# Patient Record
Sex: MALE | Race: WHITE | NOT HISPANIC OR LATINO | Employment: OTHER | URBAN - METROPOLITAN AREA
[De-identification: names, ages, dates, MRNs, and addresses within clinical notes are randomized per-mention and may not be internally consistent; named-entity substitution may affect disease eponyms.]

---

## 2018-10-15 ENCOUNTER — APPOINTMENT (OUTPATIENT)
Dept: GENERAL RADIOLOGY | Facility: CLINIC | Age: 53
End: 2018-10-15
Attending: EMERGENCY MEDICINE
Payer: MEDICAID

## 2018-10-15 ENCOUNTER — HOSPITAL ENCOUNTER (EMERGENCY)
Facility: CLINIC | Age: 53
Discharge: ANOTHER HOSPITAL | End: 2018-10-16
Attending: EMERGENCY MEDICINE | Admitting: EMERGENCY MEDICINE
Payer: MEDICAID

## 2018-10-15 ENCOUNTER — APPOINTMENT (OUTPATIENT)
Dept: CT IMAGING | Facility: CLINIC | Age: 53
End: 2018-10-15
Attending: EMERGENCY MEDICINE
Payer: MEDICAID

## 2018-10-15 DIAGNOSIS — G80.0 SPASTIC QUADRIPLEGIC CEREBRAL PALSY (H): ICD-10-CM

## 2018-10-15 DIAGNOSIS — J69.0 ASPIRATION PNEUMONIA OF BOTH LOWER LOBES, UNSPECIFIED ASPIRATION PNEUMONIA TYPE (H): Primary | ICD-10-CM

## 2018-10-15 DIAGNOSIS — F71 MODERATELY MENTALLY RETARDED: ICD-10-CM

## 2018-10-15 DIAGNOSIS — A41.9 SEPSIS, DUE TO UNSPECIFIED ORGANISM: ICD-10-CM

## 2018-10-15 DIAGNOSIS — R11.2 NON-INTRACTABLE VOMITING WITH NAUSEA, UNSPECIFIED VOMITING TYPE: ICD-10-CM

## 2018-10-15 LAB
ALBUMIN SERPL-MCNC: 3.7 G/DL (ref 3.4–5)
ALBUMIN UR-MCNC: NEGATIVE MG/DL
ALP SERPL-CCNC: 102 U/L (ref 40–150)
ALT SERPL W P-5'-P-CCNC: 30 U/L (ref 0–70)
AMORPH CRY #/AREA URNS HPF: ABNORMAL /HPF
ANION GAP SERPL CALCULATED.3IONS-SCNC: 4 MMOL/L (ref 3–14)
APPEARANCE UR: ABNORMAL
AST SERPL W P-5'-P-CCNC: 34 U/L (ref 0–45)
BACTERIA #/AREA URNS HPF: ABNORMAL /HPF
BASOPHILS # BLD AUTO: 0 10E9/L (ref 0–0.2)
BASOPHILS NFR BLD AUTO: 0.1 %
BILIRUB SERPL-MCNC: 0.4 MG/DL (ref 0.2–1.3)
BILIRUB UR QL STRIP: NEGATIVE
BUN SERPL-MCNC: 21 MG/DL (ref 7–30)
CALCIUM SERPL-MCNC: 8.4 MG/DL (ref 8.5–10.1)
CHLORIDE SERPL-SCNC: 108 MMOL/L (ref 94–109)
CO2 BLDCOV-SCNC: 31 MMOL/L (ref 21–28)
CO2 SERPL-SCNC: 30 MMOL/L (ref 20–32)
COLOR UR AUTO: YELLOW
CREAT BLD-MCNC: 0.7 MG/DL (ref 0.66–1.25)
CREAT SERPL-MCNC: 0.71 MG/DL (ref 0.66–1.25)
DIFFERENTIAL METHOD BLD: ABNORMAL
EOSINOPHIL # BLD AUTO: 0.2 10E9/L (ref 0–0.7)
EOSINOPHIL NFR BLD AUTO: 1.6 %
ERYTHROCYTE [DISTWIDTH] IN BLOOD BY AUTOMATED COUNT: 12.6 % (ref 10–15)
GFR SERPL CREATININE-BSD FRML MDRD: >90 ML/MIN/1.7M2
GFR SERPL CREATININE-BSD FRML MDRD: >90 ML/MIN/1.7M2
GLUCOSE SERPL-MCNC: 100 MG/DL (ref 70–99)
GLUCOSE UR STRIP-MCNC: NEGATIVE MG/DL
HCT VFR BLD AUTO: 40.7 % (ref 40–53)
HGB BLD-MCNC: 13.6 G/DL (ref 13.3–17.7)
HGB UR QL STRIP: NEGATIVE
IMM GRANULOCYTES # BLD: 0 10E9/L (ref 0–0.4)
IMM GRANULOCYTES NFR BLD: 0.3 %
KETONES UR STRIP-MCNC: 20 MG/DL
LACTATE BLD-SCNC: 1.1 MMOL/L (ref 0.7–2.1)
LEUKOCYTE ESTERASE UR QL STRIP: NEGATIVE
LIPASE SERPL-CCNC: 408 U/L (ref 73–393)
LYMPHOCYTES # BLD AUTO: 0.3 10E9/L (ref 0.8–5.3)
LYMPHOCYTES NFR BLD AUTO: 2.6 %
MCH RBC QN AUTO: 32.1 PG (ref 26.5–33)
MCHC RBC AUTO-ENTMCNC: 33.4 G/DL (ref 31.5–36.5)
MCV RBC AUTO: 96 FL (ref 78–100)
MONOCYTES # BLD AUTO: 0.3 10E9/L (ref 0–1.3)
MONOCYTES NFR BLD AUTO: 2.4 %
MUCOUS THREADS #/AREA URNS LPF: PRESENT /LPF
NEUTROPHILS # BLD AUTO: 9.9 10E9/L (ref 1.6–8.3)
NEUTROPHILS NFR BLD AUTO: 93 %
NITRATE UR QL: NEGATIVE
NRBC # BLD AUTO: 0 10*3/UL
NRBC BLD AUTO-RTO: 0 /100
PCO2 BLDV: 54 MM HG (ref 40–50)
PH BLDV: 7.37 PH (ref 7.32–7.43)
PH UR STRIP: 6 PH (ref 5–7)
PLATELET # BLD AUTO: 145 10E9/L (ref 150–450)
PO2 BLDV: 18 MM HG (ref 25–47)
POTASSIUM SERPL-SCNC: 4.5 MMOL/L (ref 3.4–5.3)
PROT SERPL-MCNC: 7.2 G/DL (ref 6.8–8.8)
RBC # BLD AUTO: 4.24 10E12/L (ref 4.4–5.9)
RBC #/AREA URNS AUTO: <1 /HPF (ref 0–2)
SAO2 % BLDV FROM PO2: 24 %
SODIUM SERPL-SCNC: 142 MMOL/L (ref 133–144)
SOURCE: ABNORMAL
SP GR UR STRIP: 1.03 (ref 1–1.03)
SQUAMOUS #/AREA URNS AUTO: <1 /HPF (ref 0–1)
UROBILINOGEN UR STRIP-MCNC: 0 MG/DL (ref 0–2)
WBC # BLD AUTO: 10.6 10E9/L (ref 4–11)
WBC #/AREA URNS AUTO: 1 /HPF (ref 0–5)

## 2018-10-15 PROCEDURE — 36415 COLL VENOUS BLD VENIPUNCTURE: CPT | Performed by: EMERGENCY MEDICINE

## 2018-10-15 PROCEDURE — 80053 COMPREHEN METABOLIC PANEL: CPT | Performed by: EMERGENCY MEDICINE

## 2018-10-15 PROCEDURE — 81001 URINALYSIS AUTO W/SCOPE: CPT | Performed by: EMERGENCY MEDICINE

## 2018-10-15 PROCEDURE — 96361 HYDRATE IV INFUSION ADD-ON: CPT

## 2018-10-15 PROCEDURE — 74177 CT ABD & PELVIS W/CONTRAST: CPT

## 2018-10-15 PROCEDURE — 83690 ASSAY OF LIPASE: CPT | Performed by: EMERGENCY MEDICINE

## 2018-10-15 PROCEDURE — 25000128 H RX IP 250 OP 636: Performed by: EMERGENCY MEDICINE

## 2018-10-15 PROCEDURE — 25000132 ZZH RX MED GY IP 250 OP 250 PS 637: Performed by: EMERGENCY MEDICINE

## 2018-10-15 PROCEDURE — 82565 ASSAY OF CREATININE: CPT

## 2018-10-15 PROCEDURE — 96365 THER/PROPH/DIAG IV INF INIT: CPT | Mod: 59

## 2018-10-15 PROCEDURE — 71045 X-RAY EXAM CHEST 1 VIEW: CPT

## 2018-10-15 PROCEDURE — 87040 BLOOD CULTURE FOR BACTERIA: CPT | Mod: 91 | Performed by: EMERGENCY MEDICINE

## 2018-10-15 PROCEDURE — 82803 BLOOD GASES ANY COMBINATION: CPT

## 2018-10-15 PROCEDURE — 96375 TX/PRO/DX INJ NEW DRUG ADDON: CPT

## 2018-10-15 PROCEDURE — 99285 EMERGENCY DEPT VISIT HI MDM: CPT | Mod: 25

## 2018-10-15 PROCEDURE — 83605 ASSAY OF LACTIC ACID: CPT

## 2018-10-15 PROCEDURE — 85025 COMPLETE CBC W/AUTO DIFF WBC: CPT | Performed by: EMERGENCY MEDICINE

## 2018-10-15 RX ORDER — HYDROMORPHONE HYDROCHLORIDE 1 MG/ML
0.5 INJECTION, SOLUTION INTRAMUSCULAR; INTRAVENOUS; SUBCUTANEOUS ONCE
Status: COMPLETED | OUTPATIENT
Start: 2018-10-15 | End: 2018-10-15

## 2018-10-15 RX ORDER — AMPICILLIN AND SULBACTAM 2; 1 G/1; G/1
3 INJECTION, POWDER, FOR SOLUTION INTRAMUSCULAR; INTRAVENOUS ONCE
Status: COMPLETED | OUTPATIENT
Start: 2018-10-15 | End: 2018-10-16

## 2018-10-15 RX ORDER — IOPAMIDOL 755 MG/ML
500 INJECTION, SOLUTION INTRAVASCULAR ONCE
Status: COMPLETED | OUTPATIENT
Start: 2018-10-15 | End: 2018-10-15

## 2018-10-15 RX ORDER — ACETAMINOPHEN 650 MG/1
650 SUPPOSITORY RECTAL ONCE
Status: COMPLETED | OUTPATIENT
Start: 2018-10-15 | End: 2018-10-15

## 2018-10-15 RX ORDER — KETOROLAC TROMETHAMINE 15 MG/ML
10 INJECTION, SOLUTION INTRAMUSCULAR; INTRAVENOUS ONCE
Status: COMPLETED | OUTPATIENT
Start: 2018-10-15 | End: 2018-10-15

## 2018-10-15 RX ORDER — ONDANSETRON 2 MG/ML
4 INJECTION INTRAMUSCULAR; INTRAVENOUS ONCE
Status: COMPLETED | OUTPATIENT
Start: 2018-10-15 | End: 2018-10-15

## 2018-10-15 RX ADMIN — KETOROLAC TROMETHAMINE 10 MG: 15 INJECTION, SOLUTION INTRAMUSCULAR; INTRAVENOUS at 22:17

## 2018-10-15 RX ADMIN — ACETAMINOPHEN 650 MG: 650 SUPPOSITORY RECTAL at 22:22

## 2018-10-15 RX ADMIN — ONDANSETRON 4 MG: 2 INJECTION INTRAMUSCULAR; INTRAVENOUS at 22:17

## 2018-10-15 RX ADMIN — AMPICILLIN SODIUM AND SULBACTAM SODIUM 3 G: 2; 1 INJECTION, POWDER, FOR SOLUTION INTRAMUSCULAR; INTRAVENOUS at 23:27

## 2018-10-15 RX ADMIN — IOPAMIDOL 51 ML: 755 INJECTION, SOLUTION INTRAVENOUS at 22:53

## 2018-10-15 RX ADMIN — SODIUM CHLORIDE 500 ML: 9 INJECTION, SOLUTION INTRAVENOUS at 23:35

## 2018-10-15 RX ADMIN — SODIUM CHLORIDE, POTASSIUM CHLORIDE, SODIUM LACTATE AND CALCIUM CHLORIDE 1000 ML: 600; 310; 30; 20 INJECTION, SOLUTION INTRAVENOUS at 22:16

## 2018-10-15 RX ADMIN — SODIUM CHLORIDE 53 ML: 9 INJECTION, SOLUTION INTRAVENOUS at 22:58

## 2018-10-15 RX ADMIN — Medication 0.5 MG: at 22:20

## 2018-10-15 ASSESSMENT — ENCOUNTER SYMPTOMS
FEVER: 1
CHILLS: 1
VOMITING: 1
ABDOMINAL PAIN: 1
HEADACHES: 1
SHORTNESS OF BREATH: 0

## 2018-10-16 VITALS
HEART RATE: 103 BPM | TEMPERATURE: 100.9 F | RESPIRATION RATE: 20 BRPM | DIASTOLIC BLOOD PRESSURE: 63 MMHG | SYSTOLIC BLOOD PRESSURE: 122 MMHG | OXYGEN SATURATION: 95 % | WEIGHT: 101 LBS

## 2018-10-16 PROCEDURE — 25000128 H RX IP 250 OP 636: Performed by: EMERGENCY MEDICINE

## 2018-10-16 RX ADMIN — SODIUM CHLORIDE 500 ML: 9 INJECTION, SOLUTION INTRAVENOUS at 00:14

## 2018-10-16 NOTE — ED PROVIDER NOTES
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 The patient's nurse reports the patient vomited on Friday, had two large loose stools, but then the patient noted he felt better. She reports the patient went to bed around 1900 this evening and woke up with a headache, abdominal pain, chills, and vomiting. The patient denies any shortness of breath and currently has a fever of 103.    Allergies:  No known drug allergies     Medications:    The patient is not currently taking any prescribed medications.    Past Medical History:    SBO  Cerebral palsy  Adjustment disorder  Pneumonia  Mental retardation  Seizures    Past Surgical History:    Cholecystectomy    Family History:    History reviewed. No pertinent family history.     Social History:  The patient presents to the emergency department with his nurse.    Review of Systems   Constitutional: Positive for chills and fever.   Respiratory: Negative for shortness of breath.    Gastrointestinal: Positive for abdominal pain and vomiting.   Neurological: Positive for headaches.   All other systems reviewed and are negative.    Physical Exam   Patient Vitals for the past 24 hrs:   BP Temp Temp src Pulse Resp SpO2 Weight   10/15/18 2335 - 100.9  F (38.3  C) Rectal - - - -   10/15/18 2200 112/62 - - - - 91 % -   10/15/18 2145 111/72 - - - - 93 % -   10/15/18 2143 (!) 133/112 103.6  F (39.8  C) Rectal 106 20 - 45.8 kg (101 lb)   10/15/18 2142 (!) 123/93 - - - - - -     Physical Exam   HENT:   Right Ear: External ear normal.   Left Ear: External ear normal.   Nose: Nose normal.   This membranes dry   Eyes: Conjunctivae and lids are normal.   Neck: Neck supple. No tracheal deviation present.   Cardiovascular: Regular rhythm and intact distal pulses.    Tachycardic    Pulmonary/Chest:   Mild tachypnea lungs clear anteriorly   Abdominal: Soft. He exhibits no distension. There is no tenderness. There is no rebound and no guarding.   Musculoskeletal:   Diffuse muscle atrophy increased muscular tone and spasticity   Neurological:   Awake moves extremities spontaneously answer simple question with one for yes and 2 for now which is baseline according to his PCA   Skin: Skin is warm and dry. He is not diaphoretic.   Psychiatric: He has a normal mood and affect.   Nursing note and vitals reviewed.    Emergency Department Course   Imaging:  Radiographic findings were communicated with the patient and patient's caretaker who voiced understanding of the findings.    XR chest 1 View  IMPRESSION: A few hazy opacities scattered within the lower lungs  bilaterally, better visualized on the CT scan of the abdomen and  pelvis performed just prior to this study. These are suspicious for  Pneumonia.  As read by Radiology.    Abd/pelvis CT, IV contrast only TRAUMA / AAA  IMPRESSION:  1. Ill-defined patchy opacities scattered throughout the visualized  portions of bilateral lung bases, likely representing pneumonia.  2. No other cause of acute pain identified in the abdomen or pelvis.  Note that the appendix is not visualized and therefore early  appendicitis cannot be excluded.  As read by Radiology.    Laboratory:  UA:        UA with Microscopic (Final result)    Abnormal Component (Lab Inquiry)       Collection Time Result Time COLOR APPEARANCE URINE GLC URINEBILIN URINEKETON     10/15/18 23:25:00 10/15/18 23:52:46 Yellow Slightly Cloudy Negative Negative 20 (A)          Collection Time Result Time Specific Gravity Urine URINE BLOO pH Urine Protein Albumin Urine Urobilinogen mg/dL     10/15/18 23:25:00 10/15/18 23:52:46 1.035 Negative 6.0 Negative 0.0          Collection Time Result Time NITRITE Leukocyte Esterase Urine Source WBC/HPF RBC/HPF     10/15/18 23:25:00 10/15/18 23:52:46 Negative  Negative Catheterized Urine 1 <1          Collection Time Result Time BACTERIA Squamous Epithelial /HPF Urine Mucous Urine Amorphous Crystals     10/15/18 23:25:00 10/15/18 23:52:46 Few (A) <1 Present (A) Few (A)            CBC:  (L) o/w WNL (WBC 10.6, HGB 13.6)  CMP: Glucose 100 (H), Calcium 8.4 (L) o/w WNL (Creatinine 0.71)  Blood culture: in process  Lipase: 408 (H)    ISTAT gases lactate anna POCT: PCO2 54 (H), PO2 18 (L), Bicarbonate 31 (H) o/w WNL  Creatinine POCT: Creatinine 0.7, GFR Estimate >90    Interventions:  2216 Lactated ringers bolus 1000 mL IV  2217 Zofran 4 mg IV  2217 Toradol 10 mg IV  2220 Dilaudid 0.5 mg IV  2222 Tylenol 650 mg VT  2327 Unasyn 3 g IV  2335 NS 0.5L IV Bolus  Emergency Department Course:  Patient arrived via EMS.    Past medical records, nursing notes, and vitals reviewed.  2140: I performed an exam of the patient and obtained history, as documented above.     IV inserted and blood drawn.    The patient was sent for a chest x-ray and an abdominal pelvis CT while in the emergency department, findings above.    2330: I rechecked the patient. Explained findings to the patient and his caretaker.    2338: I discussed the patient with Dr. Richardson of [facility].    Findings and plan explained to the Patient and other: Caretaker.    Patient will be transferred to [facility] via EMS. Discussed the case with Dr. Richardson, who will admit the patient to a monitored bed for further monitoring, evaluation, and treatment.   Impression & Plan    Medical Decision Makin-year-old male with a history of cerebral palsy spastic quadriplegia here with breathing dictation concerning for sepsis with most likely sources including aspiration pneumonia urinary tract infection or recurrent small bowel obstruction with concern for concomitant peritonitis given the high fever.  He has nothing on his skin to suggest a soft tissue source mentally the baseline according to his PCA  making a bacterial meningitis less likely.    Workup here shows what is most likely aspiration pneumonia.  Was given fluids as well as broad-spectrum antibiotic.  Nausea treated with antiemetics with good success.  Given the lack of medical beds here at [facility] patient will require transfer he like to go back towards his home town of [place] so will contact that hospital.  Lactate not elevated white blood cell count not elevated meets criteria for sepsis but not severe sepsis.    Discussion with the hospitalist at [place], except the patient in transfer to a MedSurg bed.  Fever downtrending tachycardia improving blood pressure stable initial lactic acid is normal started on broad-spectrum antibiotic here.  Would normally be admitted to a MedSurg bed here unfortunately with the bed being full and medical surgical bed at [place] closer to his home which is the preference of the patient/group home after discussion of [facility] for [facility] in [place].      Critical Care time:  none    Diagnosis:    ICD-10-CM   1. Aspiration pneumonia of both lower lobes, unspecified aspiration pneumonia type (H) J69.0   2. Sepsis, due to unspecified organism (H) A41.9   3. Non-intractable vomiting with nausea, unspecified vomiting type R11.2   4. 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 Moderately mentally retarded F71     Disposition:  Transferred to [facility].  Tay Madison  10/15/2018   [facility] EMERGENCY DEPARTMENT  Scribe Disclosure:  Tay COLBY, am serving as a scribe at 9:40 PM on 10/15/2018 to document services personally performed by Kevin Garcia MD based on my observations and the provider's statements to me.      Kevin Garcia MD  10/16/18 0015

## 2018-10-16 NOTE — ED TRIAGE NOTES
"Arrives via EMS with complaints of abdominal pain and N/V. Arrives awake, non-verbal at baseline with DD, vomit on clothing and in emesis bag, light brown in color, answers yes/no questions, ABCs intact at this time. Staff reports patient was \"not acting like himself\", crying and c/o headache and abdominal pain.  "

## 2018-10-16 NOTE — ED NOTES
Bed: ED30  Expected date: 10/15/18  Expected time: 9:28 PM  Means of arrival:   Comments:  [place] 56M

## 2018-10-22 LAB
BACTERIA SPEC CULT: NO GROWTH
BACTERIA SPEC CULT: NO GROWTH
Lab: NORMAL
Lab: NORMAL
SPECIMEN SOURCE: NORMAL
SPECIMEN SOURCE: NORMAL

## 2021-08-02 ENCOUNTER — NURSE TRIAGE (OUTPATIENT)
Dept: NURSING | Facility: CLINIC | Age: 56
End: 2021-08-02

## 2021-08-02 NOTE — TELEPHONE ENCOUNTER
Triage call:   Group home staff calling   Patient has been experience severe knee pain  Has given tylenol - minimal relief  Started a few days ago  Reports patient is in tears due to the pain- in severe pain  Left knee   Denies falls   Staff notes that the pain is constant - is currently in a wheel chair  Has used ice    Advised that patient should be seen in the office today- group home staff states that there is no one available to bring him in- states that they do have a nurse who rounds at their group home. No PCP through [facility]- group home staff is unaware of who his PCP is- writer advised to contact the nurse that rounds at the facility so she can contact his PCP. Group home staff verbalize understanding. Denies additional questions.     Kristel Powell RN BSN 8/2/2021 8:13 AM    COVID 19 Nurse Triage Plan/Patient Instructions    Please be aware that novel coronavirus (COVID-19) may be circulating in the community. If you develop symptoms such as fever, cough, or SOB or if you have concerns about the presence of another infection including coronavirus (COVID-19), please contact your health care provider or visit [email or web address].     Disposition/Instructions    In-Person Visit with provider recommended. Reference Visit Selection Guide.    Thank you for taking steps to prevent the spread of this virus.  o Limit your contact with others.  o Wear a simple mask to cover your cough.  o Wash your hands well and often.    Resources    [facility]: About COVID-19: [email or web address]    CDC: What to Do If You're Sick: www.cdc.gov/coronavirus/2019-ncov/about/steps-when-sick.html    CDC: Ending Home Isolation: www.cdc.gov/coronavirus/2019-ncov/hcp/disposition-in-home-patients.html     CDC: Caring for Someone: www.cdc.gov/coronavirus/2019-ncov/if-you-are-sick/care-for-someone.html     [facility]: Interim Guidance for Hospital Discharge to Home:  www.health.[email or web address]/diseases/coronavirus/hcp/hospdischarge.pdf    [facility] clinical trials (COVID-19 research studies): [email or web address]/umn-[email or web address]-trials     Below are the COVID-19 hotlines at the [place] Department of Health ([facility]). Interpreters are available.   o For health questions: Call 720-344-4675 or 1-937.325.4098 (7 a.m. to 7 p.m.)  o For questions about schools and childcare: Call 115-369-1395 or 1-294.397.9539 (7 a.m. to 7 p.m.)     Reason for Disposition    SEVERE pain (e.g., excruciating, unable to walk)    Additional Information    Negative: Sounds like a life-threatening emergency to the triager    Negative: Followed a knee injury    Negative: Swollen knee joint and fever    Negative: Thigh or calf pain and only 1 side and present > 1 hour    Negative: Thigh or calf swelling and only 1 side    Negative: Patient sounds very sick or weak to the triager    Negative: Can't move swollen joint at all    Protocols used: KNEE PAIN-A-OH

## 2021-08-12 ENCOUNTER — HOSPITAL ENCOUNTER (EMERGENCY)
Facility: CLINIC | Age: 56
Discharge: NURSING, REHAB OR LONG-TERM CARE | End: 2021-08-12
Attending: EMERGENCY MEDICINE | Admitting: EMERGENCY MEDICINE
Payer: MEDICAID

## 2021-08-12 ENCOUNTER — APPOINTMENT (OUTPATIENT)
Dept: CT IMAGING | Facility: CLINIC | Age: 56
End: 2021-08-12
Attending: EMERGENCY MEDICINE
Payer: MEDICAID

## 2021-08-12 VITALS
OXYGEN SATURATION: 98 % | TEMPERATURE: 98.9 F | DIASTOLIC BLOOD PRESSURE: 64 MMHG | SYSTOLIC BLOOD PRESSURE: 94 MMHG | RESPIRATION RATE: 18 BRPM | HEART RATE: 64 BPM
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
## 2021-08-12 DIAGNOSIS — S01.01XA SCALP LACERATION, INITIAL ENCOUNTER: ICD-10-CM

## 2021-08-12 PROCEDURE — 99285 EMERGENCY DEPT VISIT HI MDM: CPT | Mod: 25

## 2021-08-12 PROCEDURE — 72125 CT NECK SPINE W/O DYE: CPT

## 2021-08-12 PROCEDURE — 12001 RPR S/N/AX/GEN/TRNK 2.5CM/<: CPT

## 2021-08-12 PROCEDURE — 70450 CT HEAD/BRAIN W/O DYE: CPT

## 2021-08-12 RX ORDER — LIDOCAINE HYDROCHLORIDE AND EPINEPHRINE 10; 10 MG/ML; UG/ML
INJECTION, SOLUTION INFILTRATION; PERINEURAL
Status: DISCONTINUED
Start: 2021-08-12 | End: 2021-08-12 | Stop reason: HOSPADM

## 2021-08-12 ASSESSMENT — ENCOUNTER SYMPTOMS: WOUND: 1

## 2021-08-12 NOTE — ED PROVIDER NOTES
History   Chief Complaint:  Laceration     The history is provided by the EMS personnel. The history is limited by the absence of a caregiver.      Benito Marx is a 56 year old male with history of cerebral palsy who presents from his group [facility], for a laceration to the back of his head when he fell out of bed this morning. EMS state that he recently moved to a new group home. Per RN, needles make the patient very anxious and he has required ativan in the past. Patient is nonverbal at baseline.     RN called group home who states that he was given his morning medication when they went to tend to another patient and heard him fall. They note his bed is about 3 feet tall and on wood augustina. This morning he was acting per baseline.     Review of Systems   Unable to perform ROS: Patient nonverbal   Skin: Positive for wound.       Allergies:  The patient has no known allergies.     Medications:  Zofran  Abilify  Kristalose  Proventil  Protonix  Zanafelx  Singulair  Zyrtec  Colace  Senna    Past Medical History:    Adjustment disorder  Cerebral palsy  GERD  Aspiration pneumonia  Mental retardation  Seizures  Oropharyngeal dysphagia  Patellar bursitis  Congenital hemiplegia    Past Surgical History:    Cholecystectomy  Appendectomy  Resection of right back cyst    Social History:  Patient presents to the ED via EMS.  Just moved to a new group home.     Physical Exam     Patient Vitals for the past 24 hrs:   BP Temp Temp src Pulse Resp SpO2   08/12/21 0911 114/85 98.9  F (37.2  C) Oral 69 18 99 %       Physical Exam  General: Alert, does not appear to be in distress; nonverbal  Neuro:  PERRL.  EOMI.  No focal deficits  HEENT:  2 cm laceration to posterior scalp.  No hematoma.  Moist mucous membranes.  Conjunctiva normal. TMs clear bilaterally  CV:  RRR, no m/r/g, skin warm and well perfused  Pulm:  Coarse bilateral breath sounds.  No wheezing.  No acute distress, breathing comfortably  GI:  Soft,  nontender, nondistended.  No rebound or guarding.  Normal bowel sounds  MSK: No focal areas of edema, erythema, or tenderness  Skin:  WWP, no rashes, no lower extremity edema, skin color normal, no diaphoresis    Emergency Department Course     Imaging:  CT Cervical Spine w/o IV Contrast:  1. No acute fracture or posttraumatic malalignment of the cervical  spine identified.  2. Multilevel degenerative changes, as described.   As per radiology.     CT Head without contrast:   No definite CT findings of acute intracranial abnormality. As per radiology.    Procedures    Laceration Repair        LACERATION:  A simple clean 2 cm laceration.      LOCATION:  Posterior scalp      ANESTHESIA:  Local using 1% Lidocaine with Epinephrine total of 3 mLs      PREPARATION:  Irrigation and Scrubbing with Normal Saline and Shur Clens      DEBRIDEMENT:  no debridement      CLOSURE:  Wound was closed with 4 Staples    Emergency Department Course:    Reviewed:  I reviewed nursing notes, vitals, past medical history and care everywhere    Assessments:  0906 I obtained history and examined the patient as noted above.     0917 RN spoke with [facility]. States that he is acting appropriately per baseline.    [facility]: [phone] 1103 I rechecked the patient and explained findings.     Disposition:  The patient was discharged to home.     Impression & Plan     Medical Decision Making:  Benito Marx is a 56 year old male with history of CP, developmental delay, congenital hemiplegia and nonverbal state who presents to the ER for evaluation of scalp laceration status post fall from bed height.  Patient otherwise well-appearing and is reported to be at his normal baseline prior and post fall.  He is nonverbal but has no apparent pain or tenderness to the extremities.  CT head and cervical spine were obtained given patient's nonverbal status which was fortunately negative for any acute pathology.  The rest of his head to toe exam was  unremarkable.  Scalp laceration was anesthetized, cleaned, repaired with staples to close approximation as noted above.  Patient tolerated procedure well.  RN notified patient's group home that staples will need to be removed in 7-10 days.  Patient is safe to discharge back to group home.  No indication for further imaging or lab studies      Diagnosis:    ICD-10-CM    1. Accidental fall from bed, initial encounter  W06.XXXA    2. Scalp laceration, initial encounter  S01.01XA      Scribe Disclosure:  EARNESTINE, Amando Jacobs, am serving as a scribe at 9:05 AM on 8/12/2021 to document services personally performed by Aldo Brush MD based on my observations and the provider's statements to me.                Aldo Brush MD  08/12/21 1946

## 2021-08-12 NOTE — ED NOTES
Staff from [facility] reviewed discharge.  Discussed printed discharge information.  Follow-up appts reviewed.   What s/s warrant return to the ER.    Importance of social distancing, good hand hygiene, and wearing a mask when in public spaces.

## 2021-08-12 NOTE — ED NOTES
Pt lives at [facility] is contact [phone] [address] [address] [place] [address]

## 2021-08-26 ENCOUNTER — HOSPITAL ENCOUNTER (EMERGENCY)
Facility: CLINIC | Age: 56
Discharge: HOME | End: 2021-08-26
Attending: EMERGENCY MEDICINE | Admitting: EMERGENCY MEDICINE
Payer: MEDICAID

## 2021-08-26 ENCOUNTER — APPOINTMENT (OUTPATIENT)
Dept: CT IMAGING | Facility: CLINIC | Age: 56
End: 2021-08-26
Attending: EMERGENCY MEDICINE
Payer: MEDICAID

## 2021-08-26 ENCOUNTER — DOCUMENTATION ONLY (OUTPATIENT)
Dept: OTHER | Facility: CLINIC | Age: 56
End: 2021-08-26

## 2021-08-26 VITALS
TEMPERATURE: 98.1 F | OXYGEN SATURATION: 97 % | HEART RATE: 79 BPM | DIASTOLIC BLOOD PRESSURE: 67 MMHG | RESPIRATION RATE: 16 BRPM | SYSTOLIC BLOOD PRESSURE: 94 MMHG

## 2021-08-26 DIAGNOSIS — D72.819 LEUKOPENIA, UNSPECIFIED TYPE: ICD-10-CM

## 2021-08-26 DIAGNOSIS — R11.10 NON-INTRACTABLE VOMITING, PRESENCE OF NAUSEA NOT SPECIFIED, UNSPECIFIED VOMITING TYPE: ICD-10-CM

## 2021-08-26 DIAGNOSIS — S09.90XA INJURY OF HEAD, INITIAL ENCOUNTER: ICD-10-CM

## 2021-08-26 LAB
ANION GAP SERPL CALCULATED.3IONS-SCNC: 2 MMOL/L (ref 3–14)
BASOPHILS # BLD AUTO: 0 10E3/UL (ref 0–0.2)
BASOPHILS NFR BLD AUTO: 0 %
BUN SERPL-MCNC: 10 MG/DL (ref 7–30)
CALCIUM SERPL-MCNC: 8.6 MG/DL (ref 8.5–10.1)
CHLORIDE BLD-SCNC: 107 MMOL/L (ref 94–109)
CO2 SERPL-SCNC: 33 MMOL/L (ref 20–32)
CREAT SERPL-MCNC: 0.68 MG/DL (ref 0.66–1.25)
EOSINOPHIL # BLD AUTO: 0.2 10E3/UL (ref 0–0.7)
EOSINOPHIL NFR BLD AUTO: 8 %
ERYTHROCYTE [DISTWIDTH] IN BLOOD BY AUTOMATED COUNT: 12 % (ref 10–15)
GFR SERPL CREATININE-BSD FRML MDRD: >90 ML/MIN/1.73M2
GLUCOSE BLD-MCNC: 63 MG/DL (ref 70–99)
GLUCOSE BLDC GLUCOMTR-MCNC: 80 MG/DL (ref 70–99)
HCT VFR BLD AUTO: 43.5 % (ref 40–53)
HGB BLD-MCNC: 14.9 G/DL (ref 13.3–17.7)
IMM GRANULOCYTES # BLD: 0 10E3/UL
IMM GRANULOCYTES NFR BLD: 0 %
LYMPHOCYTES # BLD AUTO: 1 10E3/UL (ref 0.8–5.3)
LYMPHOCYTES NFR BLD AUTO: 39 %
MCH RBC QN AUTO: 33.6 PG (ref 26.5–33)
MCHC RBC AUTO-ENTMCNC: 34.3 G/DL (ref 31.5–36.5)
MCV RBC AUTO: 98 FL (ref 78–100)
MONOCYTES # BLD AUTO: 0.2 10E3/UL (ref 0–1.3)
MONOCYTES NFR BLD AUTO: 8 %
NEUTROPHILS # BLD AUTO: 1.2 10E3/UL (ref 1.6–8.3)
NEUTROPHILS NFR BLD AUTO: 45 %
NRBC # BLD AUTO: 0 10E3/UL
NRBC BLD AUTO-RTO: 0 /100
PLATELET # BLD AUTO: 113 10E3/UL (ref 150–450)
POTASSIUM BLD-SCNC: 3.7 MMOL/L (ref 3.4–5.3)
RBC # BLD AUTO: 4.44 10E6/UL (ref 4.4–5.9)
SARS-COV-2 RNA RESP QL NAA+PROBE: NEGATIVE
SODIUM SERPL-SCNC: 142 MMOL/L (ref 133–144)
TROPONIN I SERPL-MCNC: <0.015 UG/L (ref 0–0.04)
WBC # BLD AUTO: 2.7 10E3/UL (ref 4–11)

## 2021-08-26 PROCEDURE — C9803 HOPD COVID-19 SPEC COLLECT: HCPCS

## 2021-08-26 PROCEDURE — 85025 COMPLETE CBC W/AUTO DIFF WBC: CPT | Performed by: EMERGENCY MEDICINE

## 2021-08-26 PROCEDURE — 96374 THER/PROPH/DIAG INJ IV PUSH: CPT

## 2021-08-26 PROCEDURE — 250N000011 HC RX IP 250 OP 636: Performed by: EMERGENCY MEDICINE

## 2021-08-26 PROCEDURE — 72125 CT NECK SPINE W/O DYE: CPT

## 2021-08-26 PROCEDURE — 70450 CT HEAD/BRAIN W/O DYE: CPT

## 2021-08-26 PROCEDURE — 99285 EMERGENCY DEPT VISIT HI MDM: CPT | Mod: 25

## 2021-08-26 PROCEDURE — 80048 BASIC METABOLIC PNL TOTAL CA: CPT | Performed by: EMERGENCY MEDICINE

## 2021-08-26 PROCEDURE — 93005 ELECTROCARDIOGRAM TRACING: CPT

## 2021-08-26 PROCEDURE — 84484 ASSAY OF TROPONIN QUANT: CPT | Performed by: EMERGENCY MEDICINE

## 2021-08-26 PROCEDURE — 36415 COLL VENOUS BLD VENIPUNCTURE: CPT | Performed by: EMERGENCY MEDICINE

## 2021-08-26 PROCEDURE — 87635 SARS-COV-2 COVID-19 AMP PRB: CPT | Performed by: EMERGENCY MEDICINE

## 2021-08-26 PROCEDURE — 250N000011 HC RX IP 250 OP 636

## 2021-08-26 RX ORDER — ONDANSETRON 2 MG/ML
INJECTION INTRAMUSCULAR; INTRAVENOUS
Status: COMPLETED
Start: 2021-08-26 | End: 2021-08-26

## 2021-08-26 RX ORDER — ONDANSETRON 2 MG/ML
4 INJECTION INTRAMUSCULAR; INTRAVENOUS ONCE
Status: COMPLETED | OUTPATIENT
Start: 2021-08-26 | End: 2021-08-26

## 2021-08-26 RX ADMIN — ONDANSETRON 4 MG: 2 INJECTION INTRAMUSCULAR; INTRAVENOUS at 10:01

## 2021-08-26 NOTE — DISCHARGE INSTRUCTIONS
Your white blood cell count is slightly low.  It appears that your last few white blood cell tests over the last year to have been low as well which may be normal for you but you need to follow-up with your primary care physician for this for further evaluation.      Discharge Instructions  Head Injury    You have been seen today for a head injury. Your evaluation included a history and physical examination. You may have had a CT (CAT) scan performed, though most head injuries do not require a scan. Based on this evaluation, your provider today does not feel that your head injury is serious.    Generally, every Emergency Department visit should have a follow-up clinic visit with either a primary or a specialty clinic/provider. Please follow-up as instructed by your emergency provider today.  Return to the Emergency Department if:  You are confused or you are not acting right.  Your headache gets worse or you start to have a really bad headache even with your recommended treatment plan.  You vomit (throw up) more than once.  You have a seizure.  You have trouble walking.  You have weakness or paralysis (cannot move) in an arm or a leg.  You have blood or fluid coming from your ears or nose.  You have new symptoms or anything that worries you.    Sleeping:  It is okay for you to sleep, but someone should wake you up if instructed by your provider, and someone should check on you at your usual time to wake up.     Activity:  Do not drive for at least 24 hours.  Do not drive if you have dizzy spells or trouble concentrating, or remembering things.  Do not return to any contact sports until cleared by your regular provider.     MORE INFORMATION:    Concussion:  A concussion is a minor head injury that may cause temporary problems with the way the brain works. Although concussions are important, they are generally not an emergency or a reason that a person needs to be hospitalized. Some concussion symptoms include  confusion, amnesia (forgetful), nausea (sick to your stomach) and vomiting (throwing up), dizziness, fatigue, memory or concentration problems, irritability and sleep problems. For most people, concussions are mild and temporary but some will have more severe and persistent symptoms that require on-going care and treatment.  CT Scans: Your evaluation today may have included a CT scan (CAT scan) to look for things like bleeding or a skull fracture (broken bone).  CT scans involve radiation and too many CT scans can cause serious health problems like cancer, especially in children.  Because of this, your provider may not have ordered a CT scan today if they think you are at low risk for a serious or life threatening problem.    If you were given a prescription for medicine here today, be sure to read all of the information (including the package insert) that comes with your prescription.  This will include important information about the medicine, its side effects, and any warnings that you need to know about.  The pharmacist who fills the prescription can provide more information and answer questions you may have about the medicine.  If you have questions or concerns that the pharmacist cannot address, please call or return to the Emergency Department.     Remember that you can always come back to the Emergency Department if you are not able to see your regular provider in the amount of time listed above, if you get any new symptoms, or if there is anything that worries you.    Discharge Instructions  Vomiting    You have been seen today for vomiting (throwing up). This is usually caused by a virus, but some bacteria, parasites, medicines or other medical conditions can cause similar symptoms. At this time your provider does not find that your vomiting is a sign of anything dangerous or life-threatening. However, sometimes the signs of serious illness do not show up right away. If you have new or worse symptoms, you  may need to be seen again in the Emergency Department or by your primary provider. Remember that serious problems like appendicitis can start as vomiting.    Generally, every Emergency Department visit should have a follow-up clinic visit with either a primary or a specialty clinic/provider. Please follow-up as instructed by your emergency provider today.    Return to the Emergency Department if:  You keep vomiting and you are not able to keep liquids down.   You feel you are getting dehydrated, such as being very thirsty, not urinating (peeing) at least every 8-12 hours, or feeling faint or lightheaded.   You develop a new fever, or your fever continues for more than 2 days.   You have abdominal (belly pain) that seems worse than cramps, is in one spot, or is getting worse over time. Appendicitis usually causes pain in the right lower abdomen (to the right and below your belly button) so watch for pain in this location.  You have blood in your vomit or stools.   You feel very weak.  You are not starting to improve within 24 hours of your visit here.     What can I do to help myself?  The most important thing to do is to drink clear liquids. If you have been vomiting a lot, it is best to have only small, frequent sips of liquids. Drinking too much at once may cause more vomiting. If you are vomiting often, you must replace minerals, sodium and potassium lost with your illness. Pedialyte  is the best available rehydration liquid but some find that it doesn t taste good so sports drinks are an alterative. You can also drink clear liquids such as water, weak tea, apple juice, and 7-Up . Avoid acid liquids (orange), caffeine (coffee) or alcohol. Do not drink milk until you no longer have diarrhea (loose stools).   After liquids are staying down, you may start eating mild foods. Soda crackers, toast, plain noodles, gelatin, applesauce and bananas are good first choices. Avoid foods that have acid, are spicy, fatty or  have a lot of fiber (such as meats, coarse grains, vegetables). You may start eating these foods again in about 3 days when you are better.   Sometimes treatment includes prescription medicine to prevent nausea (sick to your stomach) and vomiting. If your provider prescribes these for you, take them as directed.   Do not take ibuprofen, naproxen, or other nonsteroidal anti-inflammatory (NSAID) medicines without checking with your healthcare provider.     If you were given a prescription for medicine here today, be sure to read all of the information (including the package insert) that comes with your prescription.  This will include important information about the medicine, its side effects, and any warnings that you need to know about.  The pharmacist who fills the prescription can provide more information and answer questions you may have about the medicine.  If you have questions or concerns that the pharmacist cannot address, please call or return to the Emergency Department.     Remember that you can always come back to the Emergency Department if you are not able to see your regular provider in the amount of time listed above, if you get any new symptoms, or if there is anything that worries you.

## 2021-08-26 NOTE — ED NOTES
Patient vomited in bed. This writer was just in room obtaining set of VS and asked patient if he wanted another blanket.

## 2021-08-26 NOTE — ED NOTES
Bed: HW01  Expected date: 8/26/21  Expected time: 8:19 AM  Means of arrival:   Comments:  BV2 56y Fall, hit head, no thinners

## 2021-08-26 NOTE — ED NOTES
Spoke with group home  [facility]. Updated on patient status and plan for discharge. Wheelchair transport set up. ETA for [facility] 1335.

## 2021-08-26 NOTE — ED PROVIDER NOTES
History   Chief Complaint:  Fall      The history is provided by the EMS personnel. The history is limited by a developmental delay.     Benito Marx is a 56 year old male, not on blood thinners, with history of cerebral palsy and mental retardation who presents after a fall. Per nursing staff report, Benito was found on the ground by caregivers at his facility this morning, who noticed that he had contusions posterior to the left ear, so he presents to the ED via EMS. He is at baseline mental status.    Per nursing staff we spoke with the group home coordinator, the patient has been having increased behavioral issues including throwing himself out of his wheelchair and onto the ground.  The patient also vomits when he feels like he is not getting his needs met.  They are not surprised that he has vomiting here.    Review of Systems   Unable to perform ROS: Patient nonverbal     Allergies:  Nutritional supplements    Medications:  Albuterol neb  Pantoprazole  Tizanidine  Zyrtec  Docusate  Sennosides  Milk of magnesia    Past Medical History:  Adjustment disorder with depressed mood  Cerebral palsy  Constipated  GERD  Aspiration pneumonia  Mental retardation, moderate  Seizures  Oropharyngeal dysphagia  Prepatellar bursitis of right knee    Past Surgical History:    Cholecystectomy  Appendectomy  Resection of right back cyst    Social History:  Presents alone via EMS.  Lives in group home or similar facility with caregivers present.  PCP (auto-populated): Pily Blackmon      Physical Exam     Patient Vitals for the past 24 hrs:   BP Temp Temp src Pulse Resp SpO2   08/26/21 1030 104/71 -- -- 57 -- --   08/26/21 1015 103/73 -- -- 60 -- --   08/26/21 1000 -- -- -- 61 -- 99 %   08/26/21 0915 100/73 -- -- 61 -- --   08/26/21 0835 120/79 98.1  F (36.7  C) Temporal 59 16 --       Physical Exam  Constitutional: Well appearing.  HEENT: Atraumatic.  PERRL.  EOMI.  Moist mucous membranes.  Neck: Soft.  Supple.  No apparent  tenderness to palpation.  No bony step-offs.  Cardiac: Regular rate and rhythm.  No murmur or rub.  Respiratory: Clear to auscultation bilaterally.  No respiratory distress.   Abdomen: Soft and nontender.  No rebound or guarding.  Nondistended.  Musculoskeletal: No edema.  Normal range of motion.  Neurologic: Alert and looking at examiner.  He can nod his head on command.  Moves extremities spontaneously.  Skin: No rashes.  No edema.  Psych: Nonverbal.  Normal affect.  Normal behavior.      Emergency Department Course   ECG  ECG taken at 0908, ECG read at 0910  Normal sinus rhythm  Low voltage QRS  Borderline ECG   Rate 61 bpm. OK interval 190 ms. QRS duration 102 ms. QT/QTc 436/438 ms. P-R-T axes 69 -9 18.     Imaging:  CT Head w/o Contrast  Normal head CT.  TANJA CISNEROS MD   Reading per radiology.     Cervical spine CT w/o contrast  The study is limited by patient motion. Alignment of the  cervical vertebrae remains normal. Craniocervical alignment remains  normal. No definite fractures; however, evaluation is limited by  motion. Degenerative endplate spurring at C3-C4, C4-C5, C5-C6 and  C6-C7 again noted.  TANJA CISNEROS MD  Reading per radiology.    Laboratory:  CBC: WBC: 2.7 (L), HGB: 14.9, PLT: 113 (L)  BMP: Glucose 63 (L), Carbon Dioxide: 33 (H), Anion Gap: 2 (L), o/w WNL (Creatinine: 0.68)  Troponin (Collected 0900): <0.015  Glucose by meter (collected 1031): 80    Emergency Department Course:    Reviewed:  I reviewed nursing notes, vitals, past medical history and care everywhere    Assessments:  0835 I obtained history and examined the patient as noted above.  1138 I rechecked the patient and explained findings.    Interventions:  0906 Zofran 4 mg IV    Disposition:  The patient was discharged to home.       Impression & Plan     Medical Decision Making:  Benito Marx is a 56-year-old man who is afebrile and hemodynamically stable.  He has no signs of external trauma on his head or elsewhere.  His CT  scan of the head and cervical spine were unremarkable for acute abnormality per radiology report.  He did develop vomiting on arrival here.  He was given Zofran with improvement.  His glucose was 63 on serum testing and he was given oral glucose with improvement.  He tolerated this and had no further vomiting.  Nursing staff spoke with the group Dora coordinator who reports that the patient has been throwing himself out of wheelchairs in bed recently due to behavioral issues and has been vomiting when he feels like he has not been taking care of her getting his needs met.  The vomiting is not unexpected per the group home.  He had no abdominal pain or tenderness.  Given that he is now tolerating p.o. intake without any further vomiting, I think he can safely discharged home.  He does have a mild leukopenia that appears to be present on his last few CBCs in care everywhere, however, it is slightly lower than normal so I recommend he follow-up with his primary care physician for further evaluation.  At this point, appears safe for discharge home and was in no distress at time of discharge with EMS.      Diagnosis:    ICD-10-CM    1. Injury of head, initial encounter  S09.90XA    2. Non-intractable vomiting, presence of nausea not specified, unspecified vomiting type  R11.10    3. Leukopenia, unspecified type  D72.819        Discharge Medications:  New Prescriptions    No medications on file       Scribe Disclosure:  I, Adrien Grissom, am serving as a scribe at 8:31 AM on 8/26/2021 to document services personally performed by Gary Argueta MD based on my observations and the provider's statements to me.      Gary Argueta MD  08/26/21 9542       Gary Argueta MD  08/27/21 [id number]

## 2021-08-26 NOTE — ED NOTES
Talked with Chelsea () from [facility] [phone]. She reports that pt has been having increased behaviors where the pt will throw himself from his wheelchair, he also throws up when he feels like his wants or needs arent being met to gain more attention.

## 2021-08-26 NOTE — ED TRIAGE NOTES
Pt arrives via EMS, EMS reports that pt comes from group home. PT has a hx of cerebral palsy, had an unwitnessed fall last night at some point when staff went to see pt this morning pt was on the ground and had a contusion to the left ear and head. PT is at baseline according to staff. PT VSS and ABC's intact. PT not on thinners

## 2021-08-27 LAB
ATRIAL RATE - MUSE: 61 BPM
DIASTOLIC BLOOD PRESSURE - MUSE: NORMAL MMHG
INTERPRETATION ECG - MUSE: NORMAL
P AXIS - MUSE: 69 DEGREES
PR INTERVAL - MUSE: 190 MS
QRS DURATION - MUSE: 102 MS
QT - MUSE: 436 MS
QTC - MUSE: 438 MS
R AXIS - MUSE: -9 DEGREES
SYSTOLIC BLOOD PRESSURE - MUSE: NORMAL MMHG
T AXIS - MUSE: 18 DEGREES
VENTRICULAR RATE- MUSE: 61 BPM

## 2021-09-18 ENCOUNTER — OFFICE VISIT (OUTPATIENT)
Dept: URGENT CARE | Facility: URGENT CARE | Age: 56
End: 2021-09-18
Payer: MEDICAID

## 2021-09-18 VITALS
RESPIRATION RATE: 18 BRPM | SYSTOLIC BLOOD PRESSURE: 124 MMHG | HEART RATE: 76 BPM | DIASTOLIC BLOOD PRESSURE: 76 MMHG | TEMPERATURE: 97.8 F | OXYGEN SATURATION: 95 %

## 2021-09-18 DIAGNOSIS — R30.0 DYSURIA: Primary | ICD-10-CM

## 2021-09-18 DIAGNOSIS — R39.15 URGENCY OF URINATION: ICD-10-CM

## 2021-09-18 PROCEDURE — 99203 OFFICE O/P NEW LOW 30 MIN: CPT | Performed by: PHYSICIAN ASSISTANT

## 2021-09-18 RX ORDER — CITALOPRAM HYDROBROMIDE 40 MG/1
40 TABLET ORAL DAILY
COMMUNITY
Start: 2021-08-19

## 2021-09-18 RX ORDER — POLYETHYLENE GLYCOL 3350 17 G/17G
17 POWDER, FOR SOLUTION ORAL DAILY
COMMUNITY
Start: 2021-08-24 | End: 2022-01-10

## 2021-09-18 RX ORDER — DOCUSATE SODIUM 100 MG/1
200 CAPSULE, LIQUID FILLED ORAL 2 TIMES DAILY
COMMUNITY
Start: 2021-08-22 | End: 2022-01-10

## 2021-09-18 RX ORDER — ALBUTEROL SULFATE 0.83 MG/ML
2.5 SOLUTION RESPIRATORY (INHALATION) 2 TIMES DAILY
COMMUNITY
Start: 2021-09-10

## 2021-09-18 RX ORDER — FLUTICASONE PROPIONATE 50 MCG
2 SPRAY, SUSPENSION (ML) NASAL DAILY
COMMUNITY
Start: 2021-08-05

## 2021-09-18 RX ORDER — SENNOSIDES A AND B 8.6 MG/1
2 TABLET, FILM COATED ORAL 2 TIMES DAILY
Status: ON HOLD | COMMUNITY
End: 2022-01-01

## 2021-09-18 RX ORDER — CETIRIZINE HYDROCHLORIDE 10 MG/1
10 TABLET ORAL DAILY
COMMUNITY
Start: 2021-08-22

## 2021-09-18 RX ORDER — LACTULOSE 20 G/20G
20 POWDER, FOR SOLUTION ORAL 2 TIMES DAILY
Status: ON HOLD | COMMUNITY
Start: 2021-09-07 | End: 2022-01-01

## 2021-09-18 RX ORDER — ACETAMINOPHEN 160 MG/5ML
325 LIQUID ORAL DAILY
COMMUNITY
Start: 2021-08-13 | End: 2022-01-10

## 2021-09-18 RX ORDER — PANTOPRAZOLE SODIUM 20 MG/1
20 TABLET, DELAYED RELEASE ORAL 2 TIMES DAILY
COMMUNITY
Start: 2021-07-20

## 2021-09-18 NOTE — PROGRESS NOTES
Assessment & Plan     Dysuria  Unable to collect UA at todays visit  Future order placed for home health care nurses to bring in UA tomorrow and we can treat at that time    - UA with Microscopic reflex to Culture; Future    Urgency of urination  UA pending  Urine culture pending  - UA with Microscopic reflex to Culture; Future         No follow-ups on file.    Ar Pitts PA-C  [facility] Thakur is a 56 year old who presents for the following health issues  accompanied by his home health nurses:    HPI     Dysuria  Urinary urgency  2 days    Review of Systems   Constitutional, HEENT, cardiovascular, pulmonary, gi and gu systems are negative, except as otherwise noted.      Objective    /76 (BP Location: Right arm, Patient Position: Chair, Cuff Size: Adult Regular)   Pulse 76   Temp 97.8  F (36.6  C) (Tympanic)   Resp 18   SpO2 95%   There is no height or weight on file to calculate BMI.  Physical Exam   GENERAL: healthy, alert and no distress    UA pending

## 2021-09-23 ENCOUNTER — MEDICAL CORRESPONDENCE (OUTPATIENT)
Dept: HEALTH INFORMATION MANAGEMENT | Facility: CLINIC | Age: 56
End: 2021-09-23

## 2021-09-29 ENCOUNTER — HOSPITAL ENCOUNTER (EMERGENCY)
Facility: CLINIC | Age: 56
Discharge: HOME | End: 2021-09-29
Attending: EMERGENCY MEDICINE | Admitting: EMERGENCY MEDICINE
Payer: MEDICAID

## 2021-09-29 ENCOUNTER — APPOINTMENT (OUTPATIENT)
Dept: CT IMAGING | Facility: CLINIC | Age: 56
End: 2021-09-29
Attending: EMERGENCY MEDICINE
Payer: MEDICAID

## 2021-09-29 VITALS
DIASTOLIC BLOOD PRESSURE: 61 MMHG | TEMPERATURE: 98.3 F | OXYGEN SATURATION: 93 % | RESPIRATION RATE: 18 BRPM | HEART RATE: 61 BPM | SYSTOLIC BLOOD PRESSURE: 100 MMHG

## 2021-09-29 DIAGNOSIS — S09.90XA TRAUMATIC INJURY OF HEAD, INITIAL ENCOUNTER: ICD-10-CM

## 2021-09-29 DIAGNOSIS — S01.111A EYEBROW LACERATION, RIGHT, INITIAL ENCOUNTER: ICD-10-CM
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
## 2021-09-29 PROCEDURE — 99283 EMERGENCY DEPT VISIT LOW MDM: CPT

## 2021-09-29 PROCEDURE — 250N000011 HC RX IP 250 OP 636: Performed by: EMERGENCY MEDICINE

## 2021-09-29 PROCEDURE — 70450 CT HEAD/BRAIN W/O DYE: CPT

## 2021-09-29 PROCEDURE — 12011 RPR F/E/E/N/L/M 2.5 CM/<: CPT

## 2021-09-29 RX ORDER — ONDANSETRON 4 MG/1
4 TABLET, ORALLY DISINTEGRATING ORAL ONCE
Status: COMPLETED | OUTPATIENT
Start: 2021-09-29 | End: 2021-09-29

## 2021-09-29 RX ORDER — ONDANSETRON 2 MG/ML
4 INJECTION INTRAMUSCULAR; INTRAVENOUS ONCE
Status: DISCONTINUED | OUTPATIENT
Start: 2021-09-29 | End: 2021-09-30 | Stop reason: HOSPADM

## 2021-09-29 RX ORDER — LIDOCAINE HYDROCHLORIDE AND EPINEPHRINE 10; 10 MG/ML; UG/ML
5 INJECTION, SOLUTION INFILTRATION; PERINEURAL ONCE
Status: DISCONTINUED | OUTPATIENT
Start: 2021-09-29 | End: 2021-09-30 | Stop reason: HOSPADM

## 2021-09-29 RX ADMIN — ONDANSETRON 4 MG: 4 TABLET, ORALLY DISINTEGRATING ORAL at 19:19

## 2021-09-29 NOTE — ED TRIAGE NOTES
Pt arrives via EMS for fall. EMS reports pt lives in group home, h/o cerebral palsy, nonverbal. EMS report staff found pt on floor after falling out of bed. EMS reports pt's bed is low to the ground, with pads surrounding bed, but that pt was found further than the pads on the floor. EMS reports staff states pt is at baseline. Small laceration to right eyebrow. Bleeding controlled. VSS. ABC's intact.

## 2021-09-29 NOTE — ED PROVIDER NOTES
History   Chief Complaint:  Fall     The history is provided by the EMS personnel.      Benito Marx is a 56 year old male with history of cerebral palsy, GERD and seizures who presents after a fall. EMS reports that the patient's group home staff found the patient on the floor after falling out of bed. States that the patient's bed is low to the floor and there is padding on the floor around his bed, however he was found further out than the pads. Staff endorses the patient is at baseline. Notes a small laceration to his right eyebrow, but the bleeding is controlled.     Review of Systems   Unable to perform ROS: Patient nonverbal     Allergies:  Nutritional Supplements    Medications:  Singulair   Macrobid   Mycostatin  Zyprexa   Milk of magnesia   Proventil   Zyrtec   Celexa  Colace  Protonix  Senokot  Zanaflex    Past Medical History:    Cerebral palsy  GERD  Aspiration pneumonia  Seizures    Past Surgical History:    Cholecystectomy    Social History:  Presents alone via EMS  Patient presents from his group home    Physical Exam     Patient Vitals for the past 24 hrs:   BP Temp Temp src Pulse Resp SpO2   09/29/21 2150 99/67 -- -- 66 -- 96 %   09/29/21 1900 -- -- -- 65 -- 99 %   09/29/21 1827 109/81 98.3  F (36.8  C) Oral 65 18 95 %       Physical Exam  Head:  The scalp normal.  2 cm laceration to right eyebrow.  Bleeding controlled.  Eyes:  Conjunctivae are normal  ENT:    The nose is normal    Pinnae are normal  Neck:  Trachea midline  CV:  Normal rate  Resp:  No respiratory distress   Musc:  Normal muscular tone  Skin:  No rash or lesions noted  Neuro: Awake, alert.  Nonverbal. face is symmetric. Moving all extremities well.               Emergency Department Course   Imaging:  CT Head w/o IV contrast:   1.  No CT finding of a mass, hemorrhage or focal area suggestive of acute infarct, as per radiology.    Procedure:    Laceration Repair        LACERATION:  A superficial minimally Contaminated 2 cm  "laceration.      LOCATION:  Right Eyebrow       FUNCTION:  Distally sensation and circulation are intact.      ANESTHESIA:  Local using 1% lidocaine with epi total of 3 mLs       PREPARATION:  Irrigation with saline      DEBRIDEMENT:  None      CLOSURE:  Wound was closed with One Layer.  Skin closed with 3 x 5.0 Fast Absorbing Plain Gut using interrupted sutures.    Emergency Department Course:    Reviewed:  I reviewed nursing notes, vitals, past medical history and care everywhere    Assessments:  1854 I obtained history and examined the patient as noted above.   2121 I rechecked the patient and explained findings.     Interventions:  1919 Zofran, 4 mg, Oral    Disposition:  The patient was discharged to home.     Impression & Plan   Medical Decision Making:  This patient presents after a fall from his bed, and has a history/exam consistent with a closed head injury. Differential diagnosis is broad, and it includes skull fracture, epidural hematoma, subdural hematoma, intracerebral hemorrhage, and traumatic subarachnoid hemorrhage, all of which are highly unlikely in this clinical setting. Given the mechanism of injury and patient's nonverbal state, [place] Head CT rules cannot rule out this patient for a head CT. Ultimately, CT scan shows no evidence of the above worrisome pathologies.     The patient's laceration was cleaned, evaluated and repaired, as stated above. The patient's care facility will watch for signs of infection, including erythema surrounding the wound, streaking, purulent drainage, or fever/chills. Additionally, I have noted \"red flag\" symptoms that would indicate immediate return to Emergency Department for further workup.  Patient discharged in stable condition.     Diagnosis:    ICD-10-CM    1. Fall, initial encounter  W19.XXXA    2. Traumatic injury of head, initial encounter  S09.90XA    3. Eyebrow laceration, right, initial encounter  S01.111A        Scribe Disclosure:  Christopher COLBY, " am serving as a scribe at 6:50 PM on 9/29/2021 to document services personally performed by Cain Peacock MD based on my observations and the provider's statements to me.            Cain Peacock MD  10/01/21 0030

## 2021-09-30 NOTE — ED NOTES
LET gel is placed on pt's right eyebrow. RN left to get zofran. When RN came back, pt had gauze and 4x4 taken off and on the floor. MD notified.

## 2021-09-30 NOTE — ED NOTES
Ayde, from [facility], called for an update. Given update, informed Ayde that pt is discharged and will be getting medical transport back to home.

## 2021-09-30 NOTE — ED NOTES
Pt lives in group home. Three phone numbers that are listed on emergency contacts. No answer from any of the phone numbers. Will continue to try.

## 2022-01-01 ENCOUNTER — HOSPITAL ENCOUNTER (INPATIENT)
Facility: CLINIC | Age: 57
LOS: 5 days | Discharge: HOSPICE | End: 2022-07-27
Attending: EMERGENCY MEDICINE | Admitting: INTERNAL MEDICINE
Payer: MEDICAID

## 2022-01-01 ENCOUNTER — APPOINTMENT (OUTPATIENT)
Dept: GENERAL RADIOLOGY | Facility: CLINIC | Age: 57
End: 2022-01-01
Attending: INTERNAL MEDICINE
Payer: MEDICAID

## 2022-01-01 ENCOUNTER — APPOINTMENT (OUTPATIENT)
Dept: SPEECH THERAPY | Facility: CLINIC | Age: 57
End: 2022-01-01
Payer: MEDICAID

## 2022-01-01 ENCOUNTER — APPOINTMENT (OUTPATIENT)
Dept: GENERAL RADIOLOGY | Facility: CLINIC | Age: 57
End: 2022-01-01
Attending: EMERGENCY MEDICINE
Payer: MEDICAID

## 2022-01-01 ENCOUNTER — APPOINTMENT (OUTPATIENT)
Dept: SPEECH THERAPY | Facility: CLINIC | Age: 57
End: 2022-01-01
Attending: INTERNAL MEDICINE
Payer: MEDICAID

## 2022-01-01 ENCOUNTER — HOSPITAL ENCOUNTER (OUTPATIENT)
Facility: CLINIC | Age: 57
Setting detail: OBSERVATION
Discharge: NURSING, REHAB OR LONG-TERM CARE | End: 2022-07-08
Attending: EMERGENCY MEDICINE | Admitting: INTERNAL MEDICINE
Payer: MEDICAID

## 2022-01-01 ENCOUNTER — HOSPITAL ENCOUNTER (INPATIENT)
Facility: CLINIC | Age: 57
LOS: 12 days | Discharge: NURSING, REHAB OR LONG-TERM CARE | End: 2022-07-22
Attending: EMERGENCY MEDICINE | Admitting: INTERNAL MEDICINE
Payer: MEDICAID

## 2022-01-01 ENCOUNTER — APPOINTMENT (OUTPATIENT)
Dept: CT IMAGING | Facility: CLINIC | Age: 57
End: 2022-01-01
Attending: EMERGENCY MEDICINE
Payer: MEDICAID

## 2022-01-01 ENCOUNTER — MEDICAL CORRESPONDENCE (OUTPATIENT)
Dept: ORTHOPEDICS | Facility: CLINIC | Age: 57
End: 2022-01-01

## 2022-01-01 ENCOUNTER — APPOINTMENT (OUTPATIENT)
Dept: GENERAL RADIOLOGY | Facility: CLINIC | Age: 57
End: 2022-01-01
Attending: PHYSICIAN ASSISTANT
Payer: MEDICAID

## 2022-01-01 ENCOUNTER — ANESTHESIA EVENT (OUTPATIENT)
Dept: SURGERY | Facility: CLINIC | Age: 57
End: 2022-01-01
Payer: MEDICAID

## 2022-01-01 ENCOUNTER — ANESTHESIA (OUTPATIENT)
Dept: SURGERY | Facility: CLINIC | Age: 57
End: 2022-01-01
Payer: MEDICAID

## 2022-01-01 ENCOUNTER — APPOINTMENT (OUTPATIENT)
Dept: CT IMAGING | Facility: CLINIC | Age: 57
End: 2022-01-01
Attending: INTERNAL MEDICINE
Payer: MEDICAID

## 2022-01-01 VITALS
SYSTOLIC BLOOD PRESSURE: 93 MMHG | RESPIRATION RATE: 18 BRPM | TEMPERATURE: 97.3 F | HEART RATE: 49 BPM | OXYGEN SATURATION: 98 % | DIASTOLIC BLOOD PRESSURE: 47 MMHG

## 2022-01-01 VITALS
HEART RATE: 85 BPM | SYSTOLIC BLOOD PRESSURE: 97 MMHG | WEIGHT: 100.7 LBS | DIASTOLIC BLOOD PRESSURE: 35 MMHG | BODY MASS INDEX: 19.67 KG/M2 | RESPIRATION RATE: 18 BRPM | TEMPERATURE: 98 F | OXYGEN SATURATION: 97 %

## 2022-01-01 VITALS
WEIGHT: 99.6 LBS | OXYGEN SATURATION: 97 % | HEART RATE: 61 BPM | BODY MASS INDEX: 19.45 KG/M2 | RESPIRATION RATE: 18 BRPM | TEMPERATURE: 98.7 F | DIASTOLIC BLOOD PRESSURE: 78 MMHG | SYSTOLIC BLOOD PRESSURE: 122 MMHG

## 2022-01-01 DIAGNOSIS — J69.0 ASPIRATION PNEUMONITIS (H): ICD-10-CM

## 2022-01-01 DIAGNOSIS — K56.41 FECAL IMPACTION (H): ICD-10-CM

## 2022-01-01 DIAGNOSIS — R11.10 INTRACTABLE VOMITING, PRESENCE OF NAUSEA NOT SPECIFIED, UNSPECIFIED VOMITING TYPE: ICD-10-CM

## 2022-01-01 DIAGNOSIS — R09.02 HYPOXIA: ICD-10-CM

## 2022-01-01 DIAGNOSIS — R11.2 INTRACTABLE NAUSEA AND VOMITING: ICD-10-CM

## 2022-01-01 DIAGNOSIS — J69.0 ASPIRATION PNEUMONIA DUE TO VOMIT, UNSPECIFIED LATERALITY, UNSPECIFIED PART OF LUNG (H): Primary | ICD-10-CM

## 2022-01-01 LAB
ALBUMIN SERPL-MCNC: 3 G/DL (ref 3.4–5)
ALBUMIN SERPL-MCNC: 3.4 G/DL (ref 3.4–5)
ALBUMIN SERPL-MCNC: 3.7 G/DL (ref 3.4–5)
ALBUMIN UR-MCNC: 10 MG/DL
ALBUMIN UR-MCNC: 30 MG/DL
ALBUMIN UR-MCNC: NEGATIVE MG/DL
ALP SERPL-CCNC: 103 U/L (ref 40–150)
ALP SERPL-CCNC: 108 U/L (ref 40–150)
ALP SERPL-CCNC: 95 U/L (ref 40–150)
ALT SERPL W P-5'-P-CCNC: 28 U/L (ref 0–70)
ALT SERPL W P-5'-P-CCNC: 46 U/L (ref 0–70)
ALT SERPL W P-5'-P-CCNC: 82 U/L (ref 0–70)
AMORPH CRY #/AREA URNS HPF: ABNORMAL /HPF
ANION GAP SERPL CALCULATED.3IONS-SCNC: 3 MMOL/L (ref 3–14)
ANION GAP SERPL CALCULATED.3IONS-SCNC: 4 MMOL/L (ref 3–14)
ANION GAP SERPL CALCULATED.3IONS-SCNC: 4 MMOL/L (ref 3–14)
ANION GAP SERPL CALCULATED.3IONS-SCNC: 5 MMOL/L (ref 3–14)
ANION GAP SERPL CALCULATED.3IONS-SCNC: 7 MMOL/L (ref 3–14)
ANION GAP SERPL CALCULATED.3IONS-SCNC: 7 MMOL/L (ref 3–14)
ANION GAP SERPL CALCULATED.3IONS-SCNC: 8 MMOL/L (ref 3–14)
ANION GAP SERPL CALCULATED.3IONS-SCNC: 9 MMOL/L (ref 3–14)
APPEARANCE UR: ABNORMAL
APPEARANCE UR: CLEAR
APPEARANCE UR: CLEAR
AST SERPL W P-5'-P-CCNC: 13 U/L (ref 0–45)
AST SERPL W P-5'-P-CCNC: 24 U/L (ref 0–45)
AST SERPL W P-5'-P-CCNC: 95 U/L (ref 0–45)
BACTERIA #/AREA URNS HPF: ABNORMAL /HPF
BACTERIA #/AREA URNS HPF: ABNORMAL /HPF
BACTERIA BLD CULT: NO GROWTH
BACTERIA BLD CULT: NO GROWTH
BASE EXCESS BLDA CALC-SCNC: -1.5 MMOL/L (ref -9–1.8)
BASE EXCESS BLDV CALC-SCNC: 4.3 MMOL/L (ref -7.7–1.9)
BASOPHILS # BLD AUTO: 0 10E3/UL (ref 0–0.2)
BASOPHILS NFR BLD AUTO: 0 %
BILIRUB DIRECT SERPL-MCNC: <0.1 MG/DL (ref 0–0.2)
BILIRUB SERPL-MCNC: 0.2 MG/DL (ref 0.2–1.3)
BILIRUB SERPL-MCNC: 0.3 MG/DL (ref 0.2–1.3)
BILIRUB SERPL-MCNC: 0.5 MG/DL (ref 0.2–1.3)
BILIRUB UR QL STRIP: NEGATIVE
BUN SERPL-MCNC: 13 MG/DL (ref 7–30)
BUN SERPL-MCNC: 14 MG/DL (ref 7–30)
BUN SERPL-MCNC: 14 MG/DL (ref 7–30)
BUN SERPL-MCNC: 15 MG/DL (ref 7–30)
BUN SERPL-MCNC: 15 MG/DL (ref 7–30)
BUN SERPL-MCNC: 16 MG/DL (ref 7–30)
BUN SERPL-MCNC: 16 MG/DL (ref 7–30)
BUN SERPL-MCNC: 17 MG/DL (ref 7–30)
BUN SERPL-MCNC: 17 MG/DL (ref 7–30)
BUN SERPL-MCNC: 22 MG/DL (ref 7–30)
BUN SERPL-MCNC: 28 MG/DL (ref 7–30)
BUN SERPL-MCNC: 36 MG/DL (ref 7–30)
CALCIUM SERPL-MCNC: 7.8 MG/DL (ref 8.5–10.1)
CALCIUM SERPL-MCNC: 7.9 MG/DL (ref 8.5–10.1)
CALCIUM SERPL-MCNC: 8 MG/DL (ref 8.5–10.1)
CALCIUM SERPL-MCNC: 8.1 MG/DL (ref 8.5–10.1)
CALCIUM SERPL-MCNC: 8.2 MG/DL (ref 8.5–10.1)
CALCIUM SERPL-MCNC: 8.2 MG/DL (ref 8.5–10.1)
CALCIUM SERPL-MCNC: 8.3 MG/DL (ref 8.5–10.1)
CALCIUM SERPL-MCNC: 8.5 MG/DL (ref 8.5–10.1)
CALCIUM SERPL-MCNC: 8.7 MG/DL (ref 8.5–10.1)
CALCIUM SERPL-MCNC: 8.9 MG/DL (ref 8.5–10.1)
CALCIUM SERPL-MCNC: 9.1 MG/DL (ref 8.5–10.1)
CALCIUM SERPL-MCNC: 9.3 MG/DL (ref 8.5–10.1)
CHLORIDE BLD-SCNC: 106 MMOL/L (ref 94–109)
CHLORIDE BLD-SCNC: 108 MMOL/L (ref 94–109)
CHLORIDE BLD-SCNC: 108 MMOL/L (ref 94–109)
CHLORIDE BLD-SCNC: 109 MMOL/L (ref 94–109)
CHLORIDE BLD-SCNC: 110 MMOL/L (ref 94–109)
CHLORIDE BLD-SCNC: 112 MMOL/L (ref 94–109)
CHLORIDE BLD-SCNC: 112 MMOL/L (ref 94–109)
CHLORIDE BLD-SCNC: 113 MMOL/L (ref 94–109)
CK SERPL-CCNC: 92 U/L (ref 30–300)
CO2 SERPL-SCNC: 24 MMOL/L (ref 20–32)
CO2 SERPL-SCNC: 24 MMOL/L (ref 20–32)
CO2 SERPL-SCNC: 25 MMOL/L (ref 20–32)
CO2 SERPL-SCNC: 26 MMOL/L (ref 20–32)
CO2 SERPL-SCNC: 27 MMOL/L (ref 20–32)
CO2 SERPL-SCNC: 28 MMOL/L (ref 20–32)
CO2 SERPL-SCNC: 28 MMOL/L (ref 20–32)
CO2 SERPL-SCNC: 29 MMOL/L (ref 20–32)
CO2 SERPL-SCNC: 29 MMOL/L (ref 20–32)
CO2 SERPL-SCNC: 30 MMOL/L (ref 20–32)
COLOR UR AUTO: ABNORMAL
COLOR UR AUTO: YELLOW
COLOR UR AUTO: YELLOW
CREAT SERPL-MCNC: 0.47 MG/DL (ref 0.66–1.25)
CREAT SERPL-MCNC: 0.48 MG/DL (ref 0.66–1.25)
CREAT SERPL-MCNC: 0.54 MG/DL (ref 0.66–1.25)
CREAT SERPL-MCNC: 0.6 MG/DL (ref 0.66–1.25)
CREAT SERPL-MCNC: 0.62 MG/DL (ref 0.66–1.25)
CREAT SERPL-MCNC: 0.62 MG/DL (ref 0.66–1.25)
CREAT SERPL-MCNC: 0.64 MG/DL (ref 0.66–1.25)
CREAT SERPL-MCNC: 0.7 MG/DL (ref 0.66–1.25)
CREAT SERPL-MCNC: 0.71 MG/DL (ref 0.66–1.25)
CREAT SERPL-MCNC: 0.72 MG/DL (ref 0.66–1.25)
EOSINOPHIL # BLD AUTO: 0.2 10E3/UL (ref 0–0.7)
EOSINOPHIL NFR BLD AUTO: 3 %
EOSINOPHIL NFR BLD AUTO: 3 %
EOSINOPHIL NFR BLD AUTO: 5 %
ERYTHROCYTE [DISTWIDTH] IN BLOOD BY AUTOMATED COUNT: 14.6 % (ref 10–15)
ERYTHROCYTE [DISTWIDTH] IN BLOOD BY AUTOMATED COUNT: 14.7 % (ref 10–15)
ERYTHROCYTE [DISTWIDTH] IN BLOOD BY AUTOMATED COUNT: 15.2 % (ref 10–15)
ERYTHROCYTE [DISTWIDTH] IN BLOOD BY AUTOMATED COUNT: 15.3 % (ref 10–15)
ERYTHROCYTE [DISTWIDTH] IN BLOOD BY AUTOMATED COUNT: 15.5 % (ref 10–15)
ERYTHROCYTE [DISTWIDTH] IN BLOOD BY AUTOMATED COUNT: 15.6 % (ref 10–15)
ERYTHROCYTE [DISTWIDTH] IN BLOOD BY AUTOMATED COUNT: 15.7 % (ref 10–15)
ERYTHROCYTE [DISTWIDTH] IN BLOOD BY AUTOMATED COUNT: 15.7 % (ref 10–15)
ERYTHROCYTE [DISTWIDTH] IN BLOOD BY AUTOMATED COUNT: 15.9 % (ref 10–15)
FLUAV RNA SPEC QL NAA+PROBE: NEGATIVE
FLUBV RNA RESP QL NAA+PROBE: NEGATIVE
GFR SERPL CREATININE-BSD FRML MDRD: >90 ML/MIN/1.73M2
GLUCOSE BLD-MCNC: 103 MG/DL (ref 70–99)
GLUCOSE BLD-MCNC: 116 MG/DL (ref 70–99)
GLUCOSE BLD-MCNC: 118 MG/DL (ref 70–99)
GLUCOSE BLD-MCNC: 131 MG/DL (ref 70–99)
GLUCOSE BLD-MCNC: 74 MG/DL (ref 70–99)
GLUCOSE BLD-MCNC: 85 MG/DL (ref 70–99)
GLUCOSE BLD-MCNC: 90 MG/DL (ref 70–99)
GLUCOSE BLD-MCNC: 93 MG/DL (ref 70–99)
GLUCOSE BLD-MCNC: 95 MG/DL (ref 70–99)
GLUCOSE BLD-MCNC: 96 MG/DL (ref 70–99)
GLUCOSE BLD-MCNC: 96 MG/DL (ref 70–99)
GLUCOSE BLD-MCNC: 98 MG/DL (ref 70–99)
GLUCOSE UR STRIP-MCNC: NEGATIVE MG/DL
HCO3 BLD-SCNC: 23 MMOL/L (ref 21–28)
HCO3 BLDV-SCNC: 29 MMOL/L (ref 21–28)
HCT VFR BLD AUTO: 30.2 % (ref 40–53)
HCT VFR BLD AUTO: 31.5 % (ref 40–53)
HCT VFR BLD AUTO: 32.2 % (ref 40–53)
HCT VFR BLD AUTO: 32.8 % (ref 40–53)
HCT VFR BLD AUTO: 33.6 % (ref 40–53)
HCT VFR BLD AUTO: 33.8 % (ref 40–53)
HCT VFR BLD AUTO: 33.9 % (ref 40–53)
HCT VFR BLD AUTO: 34.2 % (ref 40–53)
HCT VFR BLD AUTO: 35.4 % (ref 40–53)
HCT VFR BLD AUTO: 36.2 % (ref 40–53)
HCT VFR BLD AUTO: 36.2 % (ref 40–53)
HCT VFR BLD AUTO: 38 % (ref 40–53)
HCT VFR BLD AUTO: 38.6 % (ref 40–53)
HGB BLD-MCNC: 10 G/DL (ref 13.3–17.7)
HGB BLD-MCNC: 10.1 G/DL (ref 13.3–17.7)
HGB BLD-MCNC: 10.2 G/DL (ref 13.3–17.7)
HGB BLD-MCNC: 10.3 G/DL (ref 13.3–17.7)
HGB BLD-MCNC: 10.4 G/DL (ref 13.3–17.7)
HGB BLD-MCNC: 10.8 G/DL (ref 13.3–17.7)
HGB BLD-MCNC: 10.9 G/DL (ref 13.3–17.7)
HGB BLD-MCNC: 11.5 G/DL (ref 13.3–17.7)
HGB BLD-MCNC: 11.6 G/DL (ref 13.3–17.7)
HGB BLD-MCNC: 8.9 G/DL (ref 13.3–17.7)
HGB BLD-MCNC: 8.9 G/DL (ref 13.3–17.7)
HGB BLD-MCNC: 9.1 G/DL (ref 13.3–17.7)
HGB BLD-MCNC: 9.4 G/DL (ref 13.3–17.7)
HGB BLD-MCNC: 9.5 G/DL (ref 13.3–17.7)
HGB BLD-MCNC: 9.6 G/DL (ref 13.3–17.7)
HGB UR QL STRIP: NEGATIVE
HOLD SPECIMEN: NORMAL
IMM GRANULOCYTES # BLD: 0 10E3/UL
IMM GRANULOCYTES NFR BLD: 0 %
KETONES UR STRIP-MCNC: 150 MG/DL
KETONES UR STRIP-MCNC: 20 MG/DL
KETONES UR STRIP-MCNC: NEGATIVE MG/DL
LACTATE SERPL-SCNC: 1.1 MMOL/L (ref 0.7–2)
LACTATE SERPL-SCNC: 1.1 MMOL/L (ref 0.7–2)
LACTATE SERPL-SCNC: 1.5 MMOL/L (ref 0.7–2)
LEUKOCYTE ESTERASE UR QL STRIP: NEGATIVE
LIPASE SERPL-CCNC: 127 U/L (ref 73–393)
LIPASE SERPL-CCNC: 187 U/L (ref 73–393)
LYMPHOCYTES # BLD AUTO: 0.8 10E3/UL (ref 0.8–5.3)
LYMPHOCYTES # BLD AUTO: 1.3 10E3/UL (ref 0.8–5.3)
LYMPHOCYTES # BLD AUTO: 1.9 10E3/UL (ref 0.8–5.3)
LYMPHOCYTES NFR BLD AUTO: 18 %
LYMPHOCYTES NFR BLD AUTO: 27 %
LYMPHOCYTES NFR BLD AUTO: 29 %
MAGNESIUM SERPL-MCNC: 2 MG/DL (ref 1.6–2.3)
MAGNESIUM SERPL-MCNC: 2 MG/DL (ref 1.6–2.3)
MAGNESIUM SERPL-MCNC: 2.1 MG/DL (ref 1.6–2.3)
MAGNESIUM SERPL-MCNC: 2.2 MG/DL (ref 1.6–2.3)
MAGNESIUM SERPL-MCNC: 2.3 MG/DL (ref 1.6–2.3)
MAGNESIUM SERPL-MCNC: 2.4 MG/DL (ref 1.6–2.3)
MAGNESIUM SERPL-MCNC: 2.5 MG/DL (ref 1.6–2.3)
MAGNESIUM SERPL-MCNC: 2.7 MG/DL (ref 1.6–2.3)
MCH RBC QN AUTO: 25.5 PG (ref 26.5–33)
MCH RBC QN AUTO: 25.6 PG (ref 26.5–33)
MCH RBC QN AUTO: 25.7 PG (ref 26.5–33)
MCH RBC QN AUTO: 25.7 PG (ref 26.5–33)
MCH RBC QN AUTO: 25.8 PG (ref 26.5–33)
MCH RBC QN AUTO: 25.9 PG (ref 26.5–33)
MCH RBC QN AUTO: 26 PG (ref 26.5–33)
MCH RBC QN AUTO: 26 PG (ref 26.5–33)
MCH RBC QN AUTO: 26.1 PG (ref 26.5–33)
MCH RBC QN AUTO: 26.3 PG (ref 26.5–33)
MCH RBC QN AUTO: 26.5 PG (ref 26.5–33)
MCHC RBC AUTO-ENTMCNC: 29.3 G/DL (ref 31.5–36.5)
MCHC RBC AUTO-ENTMCNC: 29.4 G/DL (ref 31.5–36.5)
MCHC RBC AUTO-ENTMCNC: 29.5 G/DL (ref 31.5–36.5)
MCHC RBC AUTO-ENTMCNC: 29.5 G/DL (ref 31.5–36.5)
MCHC RBC AUTO-ENTMCNC: 29.8 G/DL (ref 31.5–36.5)
MCHC RBC AUTO-ENTMCNC: 29.9 G/DL (ref 31.5–36.5)
MCHC RBC AUTO-ENTMCNC: 30.1 G/DL (ref 31.5–36.5)
MCHC RBC AUTO-ENTMCNC: 30.3 G/DL (ref 31.5–36.5)
MCV RBC AUTO: 85 FL (ref 78–100)
MCV RBC AUTO: 86 FL (ref 78–100)
MCV RBC AUTO: 87 FL (ref 78–100)
MCV RBC AUTO: 88 FL (ref 78–100)
MCV RBC AUTO: 88 FL (ref 78–100)
MCV RBC AUTO: 89 FL (ref 78–100)
MCV RBC AUTO: 89 FL (ref 78–100)
MONOCYTES # BLD AUTO: 0.4 10E3/UL (ref 0–1.3)
MONOCYTES # BLD AUTO: 0.4 10E3/UL (ref 0–1.3)
MONOCYTES # BLD AUTO: 0.7 10E3/UL (ref 0–1.3)
MONOCYTES NFR BLD AUTO: 10 %
MONOCYTES NFR BLD AUTO: 8 %
MONOCYTES NFR BLD AUTO: 9 %
MUCOUS THREADS #/AREA URNS LPF: PRESENT /LPF
NEUTROPHILS # BLD AUTO: 2.5 10E3/UL (ref 1.6–8.3)
NEUTROPHILS # BLD AUTO: 3.3 10E3/UL (ref 1.6–8.3)
NEUTROPHILS # BLD AUTO: 4.2 10E3/UL (ref 1.6–8.3)
NEUTROPHILS NFR BLD AUTO: 57 %
NEUTROPHILS NFR BLD AUTO: 60 %
NEUTROPHILS NFR BLD AUTO: 71 %
NITRATE UR QL: NEGATIVE
NRBC # BLD AUTO: 0 10E3/UL
NRBC BLD AUTO-RTO: 0 /100
O2/TOTAL GAS SETTING VFR VENT: 2 %
O2/TOTAL GAS SETTING VFR VENT: 4 %
OXYHGB MFR BLDV: 64 % (ref 70–75)
PATH REPORT.COMMENTS IMP SPEC: NORMAL
PATH REPORT.FINAL DX SPEC: NORMAL
PATH REPORT.GROSS SPEC: NORMAL
PATH REPORT.MICROSCOPIC SPEC OTHER STN: NORMAL
PATH REPORT.MICROSCOPIC SPEC OTHER STN: NORMAL
PATH REPORT.RELEVANT HX SPEC: NORMAL
PCO2 BLD: 37 MM HG (ref 35–45)
PCO2 BLDV: 45 MM HG (ref 40–50)
PH BLD: 7.4 [PH] (ref 7.35–7.45)
PH BLDV: 7.43 [PH] (ref 7.32–7.43)
PH UR STRIP: 5.5 [PH] (ref 5–7)
PH UR STRIP: 5.5 [PH] (ref 5–7)
PH UR STRIP: 8 [PH] (ref 5–7)
PHOTO IMAGE: NORMAL
PLATELET # BLD AUTO: 128 10E3/UL (ref 150–450)
PLATELET # BLD AUTO: 142 10E3/UL (ref 150–450)
PLATELET # BLD AUTO: 145 10E3/UL (ref 150–450)
PLATELET # BLD AUTO: 162 10E3/UL (ref 150–450)
PLATELET # BLD AUTO: 165 10E3/UL (ref 150–450)
PLATELET # BLD AUTO: 166 10E3/UL (ref 150–450)
PLATELET # BLD AUTO: 172 10E3/UL (ref 150–450)
PLATELET # BLD AUTO: 185 10E3/UL (ref 150–450)
PLATELET # BLD AUTO: 186 10E3/UL (ref 150–450)
PLATELET # BLD AUTO: 188 10E3/UL (ref 150–450)
PLATELET # BLD AUTO: 195 10E3/UL (ref 150–450)
PLATELET # BLD AUTO: 208 10E3/UL (ref 150–450)
PLATELET # BLD AUTO: 230 10E3/UL (ref 150–450)
PO2 BLD: 75 MM HG (ref 80–105)
PO2 BLDV: 37 MM HG (ref 25–47)
POTASSIUM BLD-SCNC: 3.3 MMOL/L (ref 3.4–5.3)
POTASSIUM BLD-SCNC: 3.4 MMOL/L (ref 3.4–5.3)
POTASSIUM BLD-SCNC: 3.4 MMOL/L (ref 3.4–5.3)
POTASSIUM BLD-SCNC: 3.5 MMOL/L (ref 3.4–5.3)
POTASSIUM BLD-SCNC: 3.5 MMOL/L (ref 3.4–5.3)
POTASSIUM BLD-SCNC: 3.6 MMOL/L (ref 3.4–5.3)
POTASSIUM BLD-SCNC: 3.6 MMOL/L (ref 3.4–5.3)
POTASSIUM BLD-SCNC: 3.7 MMOL/L (ref 3.4–5.3)
POTASSIUM BLD-SCNC: 3.8 MMOL/L (ref 3.4–5.3)
POTASSIUM BLD-SCNC: 3.8 MMOL/L (ref 3.4–5.3)
POTASSIUM BLD-SCNC: 3.9 MMOL/L (ref 3.4–5.3)
POTASSIUM BLD-SCNC: 4 MMOL/L (ref 3.4–5.3)
POTASSIUM BLD-SCNC: 4.2 MMOL/L (ref 3.4–5.3)
POTASSIUM BLD-SCNC: 4.3 MMOL/L (ref 3.4–5.3)
PROT SERPL-MCNC: 6.1 G/DL (ref 6.8–8.8)
PROT SERPL-MCNC: 6.7 G/DL (ref 6.8–8.8)
PROT SERPL-MCNC: 7.6 G/DL (ref 6.8–8.8)
RBC # BLD AUTO: 3.45 10E6/UL (ref 4.4–5.9)
RBC # BLD AUTO: 3.61 10E6/UL (ref 4.4–5.9)
RBC # BLD AUTO: 3.69 10E6/UL (ref 4.4–5.9)
RBC # BLD AUTO: 3.69 10E6/UL (ref 4.4–5.9)
RBC # BLD AUTO: 3.81 10E6/UL (ref 4.4–5.9)
RBC # BLD AUTO: 3.91 10E6/UL (ref 4.4–5.9)
RBC # BLD AUTO: 3.92 10E6/UL (ref 4.4–5.9)
RBC # BLD AUTO: 3.97 10E6/UL (ref 4.4–5.9)
RBC # BLD AUTO: 4.01 10E6/UL (ref 4.4–5.9)
RBC # BLD AUTO: 4.15 10E6/UL (ref 4.4–5.9)
RBC # BLD AUTO: 4.22 10E6/UL (ref 4.4–5.9)
RBC # BLD AUTO: 4.47 10E6/UL (ref 4.4–5.9)
RBC # BLD AUTO: 4.48 10E6/UL (ref 4.4–5.9)
RBC URINE: 6 /HPF
RBC URINE: <1 /HPF
RBC URINE: <1 /HPF
RSV RNA SPEC NAA+PROBE: NEGATIVE
SARS-COV-2 RNA RESP QL NAA+PROBE: NEGATIVE
SODIUM SERPL-SCNC: 139 MMOL/L (ref 133–144)
SODIUM SERPL-SCNC: 140 MMOL/L (ref 133–144)
SODIUM SERPL-SCNC: 141 MMOL/L (ref 133–144)
SODIUM SERPL-SCNC: 141 MMOL/L (ref 133–144)
SODIUM SERPL-SCNC: 142 MMOL/L (ref 133–144)
SODIUM SERPL-SCNC: 143 MMOL/L (ref 133–144)
SODIUM SERPL-SCNC: 143 MMOL/L (ref 133–144)
SODIUM SERPL-SCNC: 144 MMOL/L (ref 133–144)
SP GR UR STRIP: 1.01 (ref 1–1.03)
SP GR UR STRIP: 1.03 (ref 1–1.03)
SP GR UR STRIP: 1.03 (ref 1–1.03)
SQUAMOUS EPITHELIAL: 1 /HPF
SQUAMOUS EPITHELIAL: <1 /HPF
UPPER GI ENDOSCOPY: NORMAL
UROBILINOGEN UR STRIP-MCNC: NORMAL MG/DL
WBC # BLD AUTO: 10.6 10E3/UL (ref 4–11)
WBC # BLD AUTO: 3.3 10E3/UL (ref 4–11)
WBC # BLD AUTO: 3.4 10E3/UL (ref 4–11)
WBC # BLD AUTO: 3.7 10E3/UL (ref 4–11)
WBC # BLD AUTO: 3.9 10E3/UL (ref 4–11)
WBC # BLD AUTO: 3.9 10E3/UL (ref 4–11)
WBC # BLD AUTO: 4.4 10E3/UL (ref 4–11)
WBC # BLD AUTO: 4.7 10E3/UL (ref 4–11)
WBC # BLD AUTO: 5.3 10E3/UL (ref 4–11)
WBC # BLD AUTO: 6.2 10E3/UL (ref 4–11)
WBC # BLD AUTO: 6.6 10E3/UL (ref 4–11)
WBC # BLD AUTO: 6.9 10E3/UL (ref 4–11)
WBC # BLD AUTO: 7 10E3/UL (ref 4–11)
WBC URINE: 5 /HPF
WBC URINE: <1 /HPF
WBC URINE: <1 /HPF

## 2022-01-01 PROCEDURE — 120N000001 HC R&B MED SURG/OB

## 2022-01-01 PROCEDURE — 99220 PR INITIAL OBSERVATION CARE,LEVEL III: CPT | Performed by: INTERNAL MEDICINE

## 2022-01-01 PROCEDURE — 250N000011 HC RX IP 250 OP 636: Performed by: INTERNAL MEDICINE

## 2022-01-01 PROCEDURE — 85018 HEMOGLOBIN: CPT | Performed by: INTERNAL MEDICINE

## 2022-01-01 PROCEDURE — 250N000009 HC RX 250: Performed by: INTERNAL MEDICINE

## 2022-01-01 PROCEDURE — 94640 AIRWAY INHALATION TREATMENT: CPT | Mod: 76

## 2022-01-01 PROCEDURE — 999N000157 HC STATISTIC RCP TIME EA 10 MIN

## 2022-01-01 PROCEDURE — 250N000011 HC RX IP 250 OP 636: Performed by: STUDENT IN AN ORGANIZED HEALTH CARE EDUCATION/TRAINING PROGRAM

## 2022-01-01 PROCEDURE — 258N000003 HC RX IP 258 OP 636: Performed by: STUDENT IN AN ORGANIZED HEALTH CARE EDUCATION/TRAINING PROGRAM

## 2022-01-01 PROCEDURE — 99233 SBSQ HOSP IP/OBS HIGH 50: CPT | Performed by: PHYSICIAN ASSISTANT

## 2022-01-01 PROCEDURE — 99253 IP/OBS CNSLTJ NEW/EST LOW 45: CPT | Performed by: CLINICAL NURSE SPECIALIST

## 2022-01-01 PROCEDURE — 83735 ASSAY OF MAGNESIUM: CPT | Performed by: INTERNAL MEDICINE

## 2022-01-01 PROCEDURE — 258N000003 HC RX IP 258 OP 636: Performed by: INTERNAL MEDICINE

## 2022-01-01 PROCEDURE — C9803 HOPD COVID-19 SPEC COLLECT: HCPCS

## 2022-01-01 PROCEDURE — C9113 INJ PANTOPRAZOLE SODIUM, VIA: HCPCS | Performed by: INTERNAL MEDICINE

## 2022-01-01 PROCEDURE — 85018 HEMOGLOBIN: CPT | Performed by: HOSPITALIST

## 2022-01-01 PROCEDURE — 99233 SBSQ HOSP IP/OBS HIGH 50: CPT | Performed by: HOSPITALIST

## 2022-01-01 PROCEDURE — 92526 ORAL FUNCTION THERAPY: CPT | Mod: GN

## 2022-01-01 PROCEDURE — 85027 COMPLETE CBC AUTOMATED: CPT | Performed by: INTERNAL MEDICINE

## 2022-01-01 PROCEDURE — 96375 TX/PRO/DX INJ NEW DRUG ADDON: CPT

## 2022-01-01 PROCEDURE — 250N000013 HC RX MED GY IP 250 OP 250 PS 637: Performed by: INTERNAL MEDICINE

## 2022-01-01 PROCEDURE — 36415 COLL VENOUS BLD VENIPUNCTURE: CPT | Performed by: INTERNAL MEDICINE

## 2022-01-01 PROCEDURE — 250N000011 HC RX IP 250 OP 636: Performed by: EMERGENCY MEDICINE

## 2022-01-01 PROCEDURE — 99285 EMERGENCY DEPT VISIT HI MDM: CPT | Mod: CS,25

## 2022-01-01 PROCEDURE — U0003 INFECTIOUS AGENT DETECTION BY NUCLEIC ACID (DNA OR RNA); SEVERE ACUTE RESPIRATORY SYNDROME CORONAVIRUS 2 (SARS-COV-2) (CORONAVIRUS DISEASE [COVID-19]), AMPLIFIED PROBE TECHNIQUE, MAKING USE OF HIGH THROUGHPUT TECHNOLOGIES AS DESCRIBED BY CMS-2020-01-R: HCPCS | Performed by: INTERNAL MEDICINE

## 2022-01-01 PROCEDURE — 80048 BASIC METABOLIC PNL TOTAL CA: CPT | Performed by: INTERNAL MEDICINE

## 2022-01-01 PROCEDURE — 85025 COMPLETE CBC W/AUTO DIFF WBC: CPT | Performed by: EMERGENCY MEDICINE

## 2022-01-01 PROCEDURE — 250N000013 HC RX MED GY IP 250 OP 250 PS 637: Performed by: EMERGENCY MEDICINE

## 2022-01-01 PROCEDURE — 88342 IMHCHEM/IMCYTCHM 1ST ANTB: CPT | Mod: 26 | Performed by: PATHOLOGY

## 2022-01-01 PROCEDURE — 250N000011 HC RX IP 250 OP 636: Performed by: PHYSICIAN ASSISTANT

## 2022-01-01 PROCEDURE — 96376 TX/PRO/DX INJ SAME DRUG ADON: CPT

## 2022-01-01 PROCEDURE — 71250 CT THORAX DX C-: CPT

## 2022-01-01 PROCEDURE — 94640 AIRWAY INHALATION TREATMENT: CPT

## 2022-01-01 PROCEDURE — 80053 COMPREHEN METABOLIC PANEL: CPT | Performed by: INTERNAL MEDICINE

## 2022-01-01 PROCEDURE — 250N000013 HC RX MED GY IP 250 OP 250 PS 637: Performed by: STUDENT IN AN ORGANIZED HEALTH CARE EDUCATION/TRAINING PROGRAM

## 2022-01-01 PROCEDURE — 85014 HEMATOCRIT: CPT | Performed by: INTERNAL MEDICINE

## 2022-01-01 PROCEDURE — 36415 COLL VENOUS BLD VENIPUNCTURE: CPT | Performed by: EMERGENCY MEDICINE

## 2022-01-01 PROCEDURE — 96361 HYDRATE IV INFUSION ADD-ON: CPT

## 2022-01-01 PROCEDURE — 87040 BLOOD CULTURE FOR BACTERIA: CPT | Performed by: EMERGENCY MEDICINE

## 2022-01-01 PROCEDURE — 80048 BASIC METABOLIC PNL TOTAL CA: CPT | Performed by: STUDENT IN AN ORGANIZED HEALTH CARE EDUCATION/TRAINING PROGRAM

## 2022-01-01 PROCEDURE — 99238 HOSP IP/OBS DSCHRG MGMT 30/<: CPT | Performed by: INTERNAL MEDICINE

## 2022-01-01 PROCEDURE — 999N000065 XR ABDOMEN PORT 1 VIEWS

## 2022-01-01 PROCEDURE — G0378 HOSPITAL OBSERVATION PER HR: HCPCS

## 2022-01-01 PROCEDURE — 80053 COMPREHEN METABOLIC PANEL: CPT | Performed by: EMERGENCY MEDICINE

## 2022-01-01 PROCEDURE — 96360 HYDRATION IV INFUSION INIT: CPT

## 2022-01-01 PROCEDURE — 83605 ASSAY OF LACTIC ACID: CPT | Performed by: INTERNAL MEDICINE

## 2022-01-01 PROCEDURE — 0DB98ZX EXCISION OF DUODENUM, VIA NATURAL OR ARTIFICIAL OPENING ENDOSCOPIC, DIAGNOSTIC: ICD-10-PCS | Performed by: INTERNAL MEDICINE

## 2022-01-01 PROCEDURE — 81001 URINALYSIS AUTO W/SCOPE: CPT | Performed by: EMERGENCY MEDICINE

## 2022-01-01 PROCEDURE — C9113 INJ PANTOPRAZOLE SODIUM, VIA: HCPCS | Performed by: PHYSICIAN ASSISTANT

## 2022-01-01 PROCEDURE — 85027 COMPLETE CBC AUTOMATED: CPT | Performed by: STUDENT IN AN ORGANIZED HEALTH CARE EDUCATION/TRAINING PROGRAM

## 2022-01-01 PROCEDURE — 83735 ASSAY OF MAGNESIUM: CPT | Performed by: STUDENT IN AN ORGANIZED HEALTH CARE EDUCATION/TRAINING PROGRAM

## 2022-01-01 PROCEDURE — 84132 ASSAY OF SERUM POTASSIUM: CPT | Performed by: INTERNAL MEDICINE

## 2022-01-01 PROCEDURE — 82550 ASSAY OF CK (CPK): CPT | Performed by: INTERNAL MEDICINE

## 2022-01-01 PROCEDURE — 83690 ASSAY OF LIPASE: CPT | Performed by: EMERGENCY MEDICINE

## 2022-01-01 PROCEDURE — U0003 INFECTIOUS AGENT DETECTION BY NUCLEIC ACID (DNA OR RNA); SEVERE ACUTE RESPIRATORY SYNDROME CORONAVIRUS 2 (SARS-COV-2) (CORONAVIRUS DISEASE [COVID-19]), AMPLIFIED PROBE TECHNIQUE, MAKING USE OF HIGH THROUGHPUT TECHNOLOGIES AS DESCRIBED BY CMS-2020-01-R: HCPCS | Performed by: EMERGENCY MEDICINE

## 2022-01-01 PROCEDURE — 99233 SBSQ HOSP IP/OBS HIGH 50: CPT | Performed by: INTERNAL MEDICINE

## 2022-01-01 PROCEDURE — 74230 X-RAY XM SWLNG FUNCJ C+: CPT

## 2022-01-01 PROCEDURE — 82310 ASSAY OF CALCIUM: CPT | Performed by: STUDENT IN AN ORGANIZED HEALTH CARE EDUCATION/TRAINING PROGRAM

## 2022-01-01 PROCEDURE — 71045 X-RAY EXAM CHEST 1 VIEW: CPT

## 2022-01-01 PROCEDURE — 99232 SBSQ HOSP IP/OBS MODERATE 35: CPT | Performed by: STUDENT IN AN ORGANIZED HEALTH CARE EDUCATION/TRAINING PROGRAM

## 2022-01-01 PROCEDURE — 83605 ASSAY OF LACTIC ACID: CPT | Performed by: HOSPITALIST

## 2022-01-01 PROCEDURE — 250N000013 HC RX MED GY IP 250 OP 250 PS 637: Performed by: PHYSICIAN ASSISTANT

## 2022-01-01 PROCEDURE — 85025 COMPLETE CBC W/AUTO DIFF WBC: CPT | Performed by: INTERNAL MEDICINE

## 2022-01-01 PROCEDURE — 96365 THER/PROPH/DIAG IV INF INIT: CPT

## 2022-01-01 PROCEDURE — 96374 THER/PROPH/DIAG INJ IV PUSH: CPT | Mod: 59

## 2022-01-01 PROCEDURE — 99217 PR OBSERVATION CARE DISCHARGE: CPT | Performed by: INTERNAL MEDICINE

## 2022-01-01 PROCEDURE — 36600 WITHDRAWAL OF ARTERIAL BLOOD: CPT

## 2022-01-01 PROCEDURE — 258N000001 HC RX 258: Performed by: INTERNAL MEDICINE

## 2022-01-01 PROCEDURE — 250N000011 HC RX IP 250 OP 636: Performed by: NURSE ANESTHETIST, CERTIFIED REGISTERED

## 2022-01-01 PROCEDURE — 710N000009 HC RECOVERY PHASE 1, LEVEL 1, PER MIN: Performed by: INTERNAL MEDICINE

## 2022-01-01 PROCEDURE — 99207 PR APP CREDIT; MD BILLING SHARED VISIT: CPT | Performed by: HOSPITALIST

## 2022-01-01 PROCEDURE — 250N000009 HC RX 250: Performed by: PHYSICIAN ASSISTANT

## 2022-01-01 PROCEDURE — 258N000003 HC RX IP 258 OP 636: Performed by: EMERGENCY MEDICINE

## 2022-01-01 PROCEDURE — 99225 PR SUBSEQUENT OBSERVATION CARE,LEVEL II: CPT | Performed by: INTERNAL MEDICINE

## 2022-01-01 PROCEDURE — 0DB38ZX EXCISION OF LOWER ESOPHAGUS, VIA NATURAL OR ARTIFICIAL OPENING ENDOSCOPIC, DIAGNOSTIC: ICD-10-PCS | Performed by: INTERNAL MEDICINE

## 2022-01-01 PROCEDURE — 74018 RADEX ABDOMEN 1 VIEW: CPT

## 2022-01-01 PROCEDURE — 92610 EVALUATE SWALLOWING FUNCTION: CPT | Mod: GN | Performed by: SPEECH-LANGUAGE PATHOLOGIST

## 2022-01-01 PROCEDURE — 99232 SBSQ HOSP IP/OBS MODERATE 35: CPT | Performed by: INTERNAL MEDICINE

## 2022-01-01 PROCEDURE — 84132 ASSAY OF SERUM POTASSIUM: CPT | Performed by: EMERGENCY MEDICINE

## 2022-01-01 PROCEDURE — 258N000003 HC RX IP 258 OP 636: Performed by: PHYSICIAN ASSISTANT

## 2022-01-01 PROCEDURE — 999N000053 HC STATISTIC EGD (OR PROCEDURE): Performed by: INTERNAL MEDICINE

## 2022-01-01 PROCEDURE — 82805 BLOOD GASES W/O2 SATURATION: CPT | Performed by: EMERGENCY MEDICINE

## 2022-01-01 PROCEDURE — 99232 SBSQ HOSP IP/OBS MODERATE 35: CPT | Performed by: HOSPITALIST

## 2022-01-01 PROCEDURE — 250N000009 HC RX 250: Performed by: NURSE ANESTHETIST, CERTIFIED REGISTERED

## 2022-01-01 PROCEDURE — 99222 1ST HOSP IP/OBS MODERATE 55: CPT | Mod: AI | Performed by: INTERNAL MEDICINE

## 2022-01-01 PROCEDURE — 36415 COLL VENOUS BLD VENIPUNCTURE: CPT | Performed by: STUDENT IN AN ORGANIZED HEALTH CARE EDUCATION/TRAINING PROGRAM

## 2022-01-01 PROCEDURE — 88312 SPECIAL STAINS GROUP 1: CPT | Mod: 26 | Performed by: PATHOLOGY

## 2022-01-01 PROCEDURE — 85014 HEMATOCRIT: CPT | Performed by: EMERGENCY MEDICINE

## 2022-01-01 PROCEDURE — 0DB68ZX EXCISION OF STOMACH, VIA NATURAL OR ARTIFICIAL OPENING ENDOSCOPIC, DIAGNOSTIC: ICD-10-PCS | Performed by: INTERNAL MEDICINE

## 2022-01-01 PROCEDURE — 360N000075 HC SURGERY LEVEL 2, PER MIN: Performed by: INTERNAL MEDICINE

## 2022-01-01 PROCEDURE — 81001 URINALYSIS AUTO W/SCOPE: CPT | Performed by: INTERNAL MEDICINE

## 2022-01-01 PROCEDURE — 999N000141 HC STATISTIC PRE-PROCEDURE NURSING ASSESSMENT: Performed by: INTERNAL MEDICINE

## 2022-01-01 PROCEDURE — 84132 ASSAY OF SERUM POTASSIUM: CPT | Performed by: STUDENT IN AN ORGANIZED HEALTH CARE EDUCATION/TRAINING PROGRAM

## 2022-01-01 PROCEDURE — 82310 ASSAY OF CALCIUM: CPT | Performed by: EMERGENCY MEDICINE

## 2022-01-01 PROCEDURE — U0005 INFEC AGEN DETEC AMPLI PROBE: HCPCS | Performed by: EMERGENCY MEDICINE

## 2022-01-01 PROCEDURE — 83605 ASSAY OF LACTIC ACID: CPT | Performed by: EMERGENCY MEDICINE

## 2022-01-01 PROCEDURE — 272N000001 HC OR GENERAL SUPPLY STERILE: Performed by: INTERNAL MEDICINE

## 2022-01-01 PROCEDURE — 88305 TISSUE EXAM BY PATHOLOGIST: CPT | Mod: TC | Performed by: INTERNAL MEDICINE

## 2022-01-01 PROCEDURE — 999N000128 HC STATISTIC PERIPHERAL IV START W/O US GUIDANCE

## 2022-01-01 PROCEDURE — 258N000003 HC RX IP 258 OP 636: Performed by: ANESTHESIOLOGY

## 2022-01-01 PROCEDURE — 370N000017 HC ANESTHESIA TECHNICAL FEE, PER MIN: Performed by: INTERNAL MEDICINE

## 2022-01-01 PROCEDURE — 88305 TISSUE EXAM BY PATHOLOGIST: CPT | Mod: 26 | Performed by: PATHOLOGY

## 2022-01-01 PROCEDURE — 99225 PR SUBSEQUENT OBSERVATION CARE,LEVEL II: CPT | Performed by: STUDENT IN AN ORGANIZED HEALTH CARE EDUCATION/TRAINING PROGRAM

## 2022-01-01 PROCEDURE — 99219 PR INITIAL OBSERVATION CARE,LEVEL II: CPT | Performed by: INTERNAL MEDICINE

## 2022-01-01 PROCEDURE — 36415 COLL VENOUS BLD VENIPUNCTURE: CPT | Performed by: HOSPITALIST

## 2022-01-01 PROCEDURE — 87637 SARSCOV2&INF A&B&RSV AMP PRB: CPT | Performed by: EMERGENCY MEDICINE

## 2022-01-01 PROCEDURE — 258N000003 HC RX IP 258 OP 636: Performed by: HOSPITALIST

## 2022-01-01 PROCEDURE — 82803 BLOOD GASES ANY COMBINATION: CPT | Performed by: INTERNAL MEDICINE

## 2022-01-01 PROCEDURE — 92526 ORAL FUNCTION THERAPY: CPT | Mod: GN | Performed by: SPEECH-LANGUAGE PATHOLOGIST

## 2022-01-01 PROCEDURE — 82248 BILIRUBIN DIRECT: CPT | Performed by: EMERGENCY MEDICINE

## 2022-01-01 PROCEDURE — 85027 COMPLETE CBC AUTOMATED: CPT | Performed by: EMERGENCY MEDICINE

## 2022-01-01 PROCEDURE — 92610 EVALUATE SWALLOWING FUNCTION: CPT | Mod: GN

## 2022-01-01 PROCEDURE — 80048 BASIC METABOLIC PNL TOTAL CA: CPT | Performed by: HOSPITALIST

## 2022-01-01 PROCEDURE — 88341 IMHCHEM/IMCYTCHM EA ADD ANTB: CPT | Mod: 26 | Performed by: PATHOLOGY

## 2022-01-01 PROCEDURE — 70450 CT HEAD/BRAIN W/O DYE: CPT

## 2022-01-01 PROCEDURE — 99233 SBSQ HOSP IP/OBS HIGH 50: CPT | Performed by: CLINICAL NURSE SPECIALIST

## 2022-01-01 PROCEDURE — 250N000009 HC RX 250: Performed by: EMERGENCY MEDICINE

## 2022-01-01 PROCEDURE — 74177 CT ABD & PELVIS W/CONTRAST: CPT

## 2022-01-01 RX ORDER — FLUTICASONE PROPIONATE 50 MCG
2 SPRAY, SUSPENSION (ML) NASAL DAILY
Status: DISCONTINUED | OUTPATIENT
Start: 2022-01-01 | End: 2022-01-01 | Stop reason: HOSPADM

## 2022-01-01 RX ORDER — NALOXONE HYDROCHLORIDE 0.4 MG/ML
0.2 INJECTION, SOLUTION INTRAMUSCULAR; INTRAVENOUS; SUBCUTANEOUS
Status: DISCONTINUED | OUTPATIENT
Start: 2022-01-01 | End: 2022-01-01 | Stop reason: HOSPADM

## 2022-01-01 RX ORDER — POTASSIUM CHLORIDE 7.45 MG/ML
10 INJECTION INTRAVENOUS
Status: COMPLETED | OUTPATIENT
Start: 2022-01-01 | End: 2022-01-01

## 2022-01-01 RX ORDER — OLANZAPINE 10 MG/2ML
2.5 INJECTION, POWDER, FOR SOLUTION INTRAMUSCULAR EVERY 8 HOURS PRN
Status: DISCONTINUED | OUTPATIENT
Start: 2022-01-01 | End: 2022-01-01 | Stop reason: HOSPADM

## 2022-01-01 RX ORDER — HALOPERIDOL 2 MG/ML
.5-1 SOLUTION ORAL EVERY 6 HOURS PRN
Qty: 10 ML | Refills: 0 | Status: SHIPPED | OUTPATIENT
Start: 2022-01-01

## 2022-01-01 RX ORDER — FENTANYL CITRATE 50 UG/ML
50 INJECTION, SOLUTION INTRAMUSCULAR; INTRAVENOUS EVERY 5 MIN PRN
Status: DISCONTINUED | OUTPATIENT
Start: 2022-01-01 | End: 2022-01-01 | Stop reason: HOSPADM

## 2022-01-01 RX ORDER — SODIUM CHLORIDE, SODIUM LACTATE, POTASSIUM CHLORIDE, CALCIUM CHLORIDE 600; 310; 30; 20 MG/100ML; MG/100ML; MG/100ML; MG/100ML
INJECTION, SOLUTION INTRAVENOUS CONTINUOUS
Status: DISCONTINUED | OUTPATIENT
Start: 2022-01-01 | End: 2022-01-01 | Stop reason: HOSPADM

## 2022-01-01 RX ORDER — LORAZEPAM 2 MG/ML
0.5 INJECTION INTRAMUSCULAR EVERY 6 HOURS PRN
Status: DISCONTINUED | OUTPATIENT
Start: 2022-01-01 | End: 2022-01-01 | Stop reason: HOSPADM

## 2022-01-01 RX ORDER — LORAZEPAM 2 MG/ML
0.5 INJECTION INTRAMUSCULAR EVERY 4 HOURS PRN
Status: DISCONTINUED | OUTPATIENT
Start: 2022-01-01 | End: 2022-01-01 | Stop reason: HOSPADM

## 2022-01-01 RX ORDER — VITAMIN B COMPLEX
1000 TABLET ORAL DAILY
Status: DISCONTINUED | OUTPATIENT
Start: 2022-01-01 | End: 2022-01-01 | Stop reason: HOSPADM

## 2022-01-01 RX ORDER — PANTOPRAZOLE SODIUM 40 MG/1
40 TABLET, DELAYED RELEASE ORAL
Status: DISCONTINUED | OUTPATIENT
Start: 2022-01-01 | End: 2022-01-01 | Stop reason: ALTCHOICE

## 2022-01-01 RX ORDER — PROCHLORPERAZINE MALEATE 5 MG
10 TABLET ORAL EVERY 6 HOURS PRN
Status: DISCONTINUED | OUTPATIENT
Start: 2022-01-01 | End: 2022-01-01 | Stop reason: HOSPADM

## 2022-01-01 RX ORDER — BARIUM SULFATE 400 MG/ML
SUSPENSION ORAL ONCE
Status: COMPLETED | OUTPATIENT
Start: 2022-01-01 | End: 2022-01-01

## 2022-01-01 RX ORDER — ALBUTEROL SULFATE 0.83 MG/ML
2.5 SOLUTION RESPIRATORY (INHALATION) EVERY 4 HOURS PRN
Status: DISCONTINUED | OUTPATIENT
Start: 2022-01-01 | End: 2022-01-01 | Stop reason: HOSPADM

## 2022-01-01 RX ORDER — LORAZEPAM 2 MG/ML
0.5 CONCENTRATE ORAL EVERY 4 HOURS PRN
Qty: 30 ML | Refills: 0 | Status: SHIPPED | OUTPATIENT
Start: 2022-01-01

## 2022-01-01 RX ORDER — ONDANSETRON 4 MG/1
4 TABLET, ORALLY DISINTEGRATING ORAL EVERY 6 HOURS PRN
Status: DISCONTINUED | OUTPATIENT
Start: 2022-01-01 | End: 2022-01-01 | Stop reason: HOSPADM

## 2022-01-01 RX ORDER — OLOPATADINE HYDROCHLORIDE 1 MG/ML
1 SOLUTION/ DROPS OPHTHALMIC 2 TIMES DAILY
Status: DISCONTINUED | OUTPATIENT
Start: 2022-01-01 | End: 2022-01-01 | Stop reason: HOSPADM

## 2022-01-01 RX ORDER — PROPOFOL 10 MG/ML
INJECTION, EMULSION INTRAVENOUS CONTINUOUS PRN
Status: DISCONTINUED | OUTPATIENT
Start: 2022-01-01 | End: 2022-01-01

## 2022-01-01 RX ORDER — LORAZEPAM 2 MG/ML
.5-1 INJECTION INTRAMUSCULAR
Status: DISCONTINUED | OUTPATIENT
Start: 2022-01-01 | End: 2022-01-01 | Stop reason: HOSPADM

## 2022-01-01 RX ORDER — LIDOCAINE 40 MG/G
CREAM TOPICAL
Status: DISCONTINUED | OUTPATIENT
Start: 2022-01-01 | End: 2022-01-01 | Stop reason: HOSPADM

## 2022-01-01 RX ORDER — MEPERIDINE HYDROCHLORIDE 25 MG/ML
12.5 INJECTION INTRAMUSCULAR; INTRAVENOUS; SUBCUTANEOUS
Status: DISCONTINUED | OUTPATIENT
Start: 2022-01-01 | End: 2022-01-01 | Stop reason: HOSPADM

## 2022-01-01 RX ORDER — OLANZAPINE 2.5 MG/1
2.5 TABLET, FILM COATED ORAL AT BEDTIME
Status: DISCONTINUED | OUTPATIENT
Start: 2022-01-01 | End: 2022-01-01 | Stop reason: HOSPADM

## 2022-01-01 RX ORDER — ACETAMINOPHEN 650 MG/1
650 SUPPOSITORY RECTAL EVERY 4 HOURS PRN
Qty: 4 SUPPOSITORY | Refills: 0 | Status: SHIPPED | OUTPATIENT
Start: 2022-01-01

## 2022-01-01 RX ORDER — ACETAMINOPHEN 325 MG/1
650 TABLET ORAL EVERY 6 HOURS PRN
Status: DISCONTINUED | OUTPATIENT
Start: 2022-01-01 | End: 2022-01-01 | Stop reason: HOSPADM

## 2022-01-01 RX ORDER — METOCLOPRAMIDE HYDROCHLORIDE 5 MG/ML
10 INJECTION INTRAMUSCULAR; INTRAVENOUS EVERY 6 HOURS PRN
Status: DISCONTINUED | OUTPATIENT
Start: 2022-01-01 | End: 2022-01-01 | Stop reason: HOSPADM

## 2022-01-01 RX ORDER — BISACODYL 10 MG
10 SUPPOSITORY, RECTAL RECTAL DAILY PRN
Status: DISCONTINUED | OUTPATIENT
Start: 2022-01-01 | End: 2022-01-01 | Stop reason: HOSPADM

## 2022-01-01 RX ORDER — ACETAMINOPHEN 650 MG/1
650 SUPPOSITORY RECTAL ONCE
Status: COMPLETED | OUTPATIENT
Start: 2022-01-01 | End: 2022-01-01

## 2022-01-01 RX ORDER — ATROPINE SULFATE 10 MG/ML
1-2 SOLUTION/ DROPS OPHTHALMIC EVERY 4 HOURS PRN
Qty: 5 ML | Refills: 0 | Status: SHIPPED | OUTPATIENT
Start: 2022-01-01

## 2022-01-01 RX ORDER — ONDANSETRON 2 MG/ML
4 INJECTION INTRAMUSCULAR; INTRAVENOUS ONCE
Status: COMPLETED | OUTPATIENT
Start: 2022-01-01 | End: 2022-01-01

## 2022-01-01 RX ORDER — ATROPINE SULFATE 10 MG/ML
2 SOLUTION/ DROPS OPHTHALMIC EVERY 4 HOURS PRN
Status: DISCONTINUED | OUTPATIENT
Start: 2022-01-01 | End: 2022-01-01 | Stop reason: HOSPADM

## 2022-01-01 RX ORDER — ONDANSETRON 2 MG/ML
4 INJECTION INTRAMUSCULAR; INTRAVENOUS EVERY 6 HOURS PRN
Status: DISCONTINUED | OUTPATIENT
Start: 2022-01-01 | End: 2022-01-01 | Stop reason: HOSPADM

## 2022-01-01 RX ORDER — CETIRIZINE HYDROCHLORIDE 10 MG/1
10 TABLET ORAL DAILY
Status: DISCONTINUED | OUTPATIENT
Start: 2022-01-01 | End: 2022-01-01 | Stop reason: HOSPADM

## 2022-01-01 RX ORDER — SODIUM CHLORIDE 9 MG/ML
INJECTION, SOLUTION INTRAVENOUS CONTINUOUS
Status: DISCONTINUED | OUTPATIENT
Start: 2022-01-01 | End: 2022-01-01

## 2022-01-01 RX ORDER — ONDANSETRON 2 MG/ML
INJECTION INTRAMUSCULAR; INTRAVENOUS PRN
Status: DISCONTINUED | OUTPATIENT
Start: 2022-01-01 | End: 2022-01-01

## 2022-01-01 RX ORDER — LORAZEPAM 2 MG/ML
0.5 INJECTION INTRAMUSCULAR EVERY 4 HOURS PRN
Status: DISCONTINUED | OUTPATIENT
Start: 2022-01-01 | End: 2022-01-01 | Stop reason: RX

## 2022-01-01 RX ORDER — AMPICILLIN AND SULBACTAM 2; 1 G/1; G/1
3 INJECTION, POWDER, FOR SOLUTION INTRAMUSCULAR; INTRAVENOUS EVERY 6 HOURS
Status: DISCONTINUED | OUTPATIENT
Start: 2022-01-01 | End: 2022-01-01 | Stop reason: HOSPADM

## 2022-01-01 RX ORDER — AMOXICILLIN 250 MG
2 CAPSULE ORAL 2 TIMES DAILY PRN
Status: DISCONTINUED | OUTPATIENT
Start: 2022-01-01 | End: 2022-01-01 | Stop reason: HOSPADM

## 2022-01-01 RX ORDER — ONDANSETRON 2 MG/ML
4 INJECTION INTRAMUSCULAR; INTRAVENOUS EVERY 30 MIN PRN
Status: DISCONTINUED | OUTPATIENT
Start: 2022-01-01 | End: 2022-01-01 | Stop reason: HOSPADM

## 2022-01-01 RX ORDER — POLYETHYLENE GLYCOL 3350 17 G/17G
17 POWDER, FOR SOLUTION ORAL 2 TIMES DAILY
Qty: 510 G | Refills: 0 | Status: ON HOLD | OUTPATIENT
Start: 2022-01-01 | End: 2022-01-01

## 2022-01-01 RX ORDER — KETAMINE HYDROCHLORIDE 10 MG/ML
INJECTION INTRAMUSCULAR; INTRAVENOUS PRN
Status: DISCONTINUED | OUTPATIENT
Start: 2022-01-01 | End: 2022-01-01

## 2022-01-01 RX ORDER — SUCRALFATE 1 G/1
1 TABLET ORAL
Status: DISCONTINUED | OUTPATIENT
Start: 2022-01-01 | End: 2022-01-01 | Stop reason: HOSPADM

## 2022-01-01 RX ORDER — SODIUM FLUORIDE AND POTASSIUM NITRATE 57.5; 5.8 MG/ML; MG/ML
GEL DENTAL DAILY
Status: DISCONTINUED | OUTPATIENT
Start: 2022-01-01 | End: 2022-01-01 | Stop reason: HOSPADM

## 2022-01-01 RX ORDER — MONTELUKAST SODIUM 10 MG/1
10 TABLET ORAL AT BEDTIME
Status: DISCONTINUED | OUTPATIENT
Start: 2022-01-01 | End: 2022-01-01 | Stop reason: HOSPADM

## 2022-01-01 RX ORDER — HYDRALAZINE HYDROCHLORIDE 20 MG/ML
5-10 INJECTION INTRAMUSCULAR; INTRAVENOUS EVERY 10 MIN PRN
Status: DISCONTINUED | OUTPATIENT
Start: 2022-01-01 | End: 2022-01-01 | Stop reason: HOSPADM

## 2022-01-01 RX ORDER — OXYCODONE HYDROCHLORIDE 5 MG/1
5 TABLET ORAL EVERY 4 HOURS PRN
Status: DISCONTINUED | OUTPATIENT
Start: 2022-01-01 | End: 2022-01-01 | Stop reason: HOSPADM

## 2022-01-01 RX ORDER — ONDANSETRON 4 MG/1
4 TABLET, ORALLY DISINTEGRATING ORAL EVERY 30 MIN PRN
Status: DISCONTINUED | OUTPATIENT
Start: 2022-01-01 | End: 2022-01-01 | Stop reason: HOSPADM

## 2022-01-01 RX ORDER — ACETAMINOPHEN 650 MG/1
650 SUPPOSITORY RECTAL EVERY 6 HOURS PRN
Status: DISCONTINUED | OUTPATIENT
Start: 2022-01-01 | End: 2022-01-01 | Stop reason: HOSPADM

## 2022-01-01 RX ORDER — PROCHLORPERAZINE 25 MG
25 SUPPOSITORY, RECTAL RECTAL EVERY 12 HOURS PRN
Status: DISCONTINUED | OUTPATIENT
Start: 2022-01-01 | End: 2022-01-01 | Stop reason: HOSPADM

## 2022-01-01 RX ORDER — SENNOSIDES A AND B 8.6 MG/1
1-2 TABLET, FILM COATED ORAL 2 TIMES DAILY PRN
Qty: 100 TABLET | Refills: 0 | Status: SHIPPED | OUTPATIENT
Start: 2022-01-01

## 2022-01-01 RX ORDER — PANTOPRAZOLE SODIUM 20 MG/1
20 TABLET, DELAYED RELEASE ORAL 2 TIMES DAILY
Status: DISCONTINUED | OUTPATIENT
Start: 2022-01-01 | End: 2022-01-01

## 2022-01-01 RX ORDER — OLANZAPINE 10 MG/2ML
5 INJECTION, POWDER, FOR SOLUTION INTRAMUSCULAR DAILY PRN
Status: DISCONTINUED | OUTPATIENT
Start: 2022-01-01 | End: 2022-01-01 | Stop reason: HOSPADM

## 2022-01-01 RX ORDER — BISACODYL 10 MG
10 SUPPOSITORY, RECTAL RECTAL DAILY PRN
Status: DISCONTINUED | OUTPATIENT
Start: 2022-01-01 | End: 2022-01-01

## 2022-01-01 RX ORDER — LORAZEPAM 0.5 MG/1
0.5 TABLET ORAL EVERY 6 HOURS PRN
Status: DISCONTINUED | OUTPATIENT
Start: 2022-01-01 | End: 2022-01-01 | Stop reason: HOSPADM

## 2022-01-01 RX ORDER — ALBUTEROL SULFATE 0.83 MG/ML
2.5 SOLUTION RESPIRATORY (INHALATION) 2 TIMES DAILY
Status: DISCONTINUED | OUTPATIENT
Start: 2022-01-01 | End: 2022-01-01 | Stop reason: HOSPADM

## 2022-01-01 RX ORDER — DEXTROSE MONOHYDRATE, SODIUM CHLORIDE, AND POTASSIUM CHLORIDE 50; 1.49; 4.5 G/1000ML; G/1000ML; G/1000ML
INJECTION, SOLUTION INTRAVENOUS CONTINUOUS
Status: DISCONTINUED | OUTPATIENT
Start: 2022-01-01 | End: 2022-01-01

## 2022-01-01 RX ORDER — MORPHINE SULFATE 20 MG/ML
10 SOLUTION ORAL
Status: DISCONTINUED | OUTPATIENT
Start: 2022-01-01 | End: 2022-01-01 | Stop reason: HOSPADM

## 2022-01-01 RX ORDER — POLYETHYLENE GLYCOL 3350 17 G/17G
17 POWDER, FOR SOLUTION ORAL 2 TIMES DAILY
Status: DISCONTINUED | OUTPATIENT
Start: 2022-01-01 | End: 2022-01-01 | Stop reason: HOSPADM

## 2022-01-01 RX ORDER — FLUMAZENIL 0.1 MG/ML
0.2 INJECTION, SOLUTION INTRAVENOUS
Status: ACTIVE | OUTPATIENT
Start: 2022-01-01 | End: 2022-01-01

## 2022-01-01 RX ORDER — BISACODYL 10 MG
10 SUPPOSITORY, RECTAL RECTAL ONCE
Status: COMPLETED | OUTPATIENT
Start: 2022-01-01 | End: 2022-01-01

## 2022-01-01 RX ORDER — SODIUM CHLORIDE, SODIUM LACTATE, POTASSIUM CHLORIDE, CALCIUM CHLORIDE 600; 310; 30; 20 MG/100ML; MG/100ML; MG/100ML; MG/100ML
INJECTION, SOLUTION INTRAVENOUS CONTINUOUS
Status: DISCONTINUED | OUTPATIENT
Start: 2022-01-01 | End: 2022-01-01

## 2022-01-01 RX ORDER — HYDROMORPHONE HYDROCHLORIDE 1 MG/ML
0.5 INJECTION, SOLUTION INTRAMUSCULAR; INTRAVENOUS; SUBCUTANEOUS EVERY 5 MIN PRN
Status: DISCONTINUED | OUTPATIENT
Start: 2022-01-01 | End: 2022-01-01 | Stop reason: HOSPADM

## 2022-01-01 RX ORDER — NALOXONE HYDROCHLORIDE 0.4 MG/ML
0.1 INJECTION, SOLUTION INTRAMUSCULAR; INTRAVENOUS; SUBCUTANEOUS
Status: DISCONTINUED | OUTPATIENT
Start: 2022-01-01 | End: 2022-01-01 | Stop reason: HOSPADM

## 2022-01-01 RX ORDER — ONDANSETRON 4 MG/1
4 TABLET, ORALLY DISINTEGRATING ORAL EVERY 4 HOURS PRN
Qty: 20 TABLET | Refills: 0 | Status: SHIPPED | OUTPATIENT
Start: 2022-01-01

## 2022-01-01 RX ORDER — DEXAMETHASONE SODIUM PHOSPHATE 4 MG/ML
INJECTION, SOLUTION INTRA-ARTICULAR; INTRALESIONAL; INTRAMUSCULAR; INTRAVENOUS; SOFT TISSUE PRN
Status: DISCONTINUED | OUTPATIENT
Start: 2022-01-01 | End: 2022-01-01

## 2022-01-01 RX ORDER — SCOLOPAMINE TRANSDERMAL SYSTEM 1 MG/1
1 PATCH, EXTENDED RELEASE TRANSDERMAL
Status: DISCONTINUED | OUTPATIENT
Start: 2022-01-01 | End: 2022-01-01 | Stop reason: HOSPADM

## 2022-01-01 RX ORDER — SODIUM FLUORIDE 5 MG/G
GEL, DENTIFRICE DENTAL AT BEDTIME
Status: DISCONTINUED | OUTPATIENT
Start: 2022-01-01 | End: 2022-01-01

## 2022-01-01 RX ORDER — NALOXONE HYDROCHLORIDE 0.4 MG/ML
0.4 INJECTION, SOLUTION INTRAMUSCULAR; INTRAVENOUS; SUBCUTANEOUS
Status: DISCONTINUED | OUTPATIENT
Start: 2022-01-01 | End: 2022-01-01 | Stop reason: HOSPADM

## 2022-01-01 RX ORDER — SENNOSIDES 8.6 MG
2 TABLET ORAL 2 TIMES DAILY
Status: DISCONTINUED | OUTPATIENT
Start: 2022-01-01 | End: 2022-01-01 | Stop reason: HOSPADM

## 2022-01-01 RX ORDER — BISACODYL 10 MG
10 SUPPOSITORY, RECTAL RECTAL DAILY PRN
Qty: 2 SUPPOSITORY | Refills: 0 | Status: SHIPPED | OUTPATIENT
Start: 2022-01-01

## 2022-01-01 RX ORDER — MORPHINE SULFATE 100 MG/5ML
2.5-5 SOLUTION ORAL
Qty: 30 ML | Refills: 0 | Status: SHIPPED | OUTPATIENT
Start: 2022-01-01

## 2022-01-01 RX ORDER — CITALOPRAM HYDROBROMIDE 20 MG/1
40 TABLET ORAL DAILY
Status: DISCONTINUED | OUTPATIENT
Start: 2022-01-01 | End: 2022-01-01 | Stop reason: HOSPADM

## 2022-01-01 RX ORDER — LIDOCAINE HYDROCHLORIDE 10 MG/ML
INJECTION, SOLUTION EPIDURAL; INFILTRATION; INTRACAUDAL; PERINEURAL PRN
Status: DISCONTINUED | OUTPATIENT
Start: 2022-01-01 | End: 2022-01-01

## 2022-01-01 RX ORDER — LORAZEPAM 0.5 MG/1
0.5 TABLET ORAL EVERY 4 HOURS PRN
Status: DISCONTINUED | OUTPATIENT
Start: 2022-01-01 | End: 2022-01-01 | Stop reason: HOSPADM

## 2022-01-01 RX ORDER — LORAZEPAM 2 MG/ML
1 CONCENTRATE ORAL EVERY 4 HOURS PRN
Status: DISCONTINUED | OUTPATIENT
Start: 2022-01-01 | End: 2022-01-01 | Stop reason: HOSPADM

## 2022-01-01 RX ORDER — BISACODYL 10 MG
10 SUPPOSITORY, RECTAL RECTAL DAILY PRN
Qty: 30 SUPPOSITORY | Refills: 0 | Status: ON HOLD | OUTPATIENT
Start: 2022-01-01 | End: 2022-01-01

## 2022-01-01 RX ORDER — ONDANSETRON 2 MG/ML
4 INJECTION INTRAMUSCULAR; INTRAVENOUS EVERY 30 MIN PRN
Status: DISCONTINUED | OUTPATIENT
Start: 2022-01-01 | End: 2022-01-01

## 2022-01-01 RX ORDER — SODIUM FLUORIDE 5 MG/G
GEL, DENTIFRICE DENTAL AT BEDTIME
COMMUNITY

## 2022-01-01 RX ORDER — IOPAMIDOL 755 MG/ML
500 INJECTION, SOLUTION INTRAVASCULAR ONCE
Status: COMPLETED | OUTPATIENT
Start: 2022-01-01 | End: 2022-01-01

## 2022-01-01 RX ORDER — PANTOPRAZOLE SODIUM 20 MG/1
20 TABLET, DELAYED RELEASE ORAL 2 TIMES DAILY
Status: DISCONTINUED | OUTPATIENT
Start: 2022-01-01 | End: 2022-01-01 | Stop reason: HOSPADM

## 2022-01-01 RX ORDER — FENTANYL CITRATE 50 UG/ML
25 INJECTION, SOLUTION INTRAMUSCULAR; INTRAVENOUS
Status: DISCONTINUED | OUTPATIENT
Start: 2022-01-01 | End: 2022-01-01 | Stop reason: HOSPADM

## 2022-01-01 RX ORDER — AMOXICILLIN 250 MG
1 CAPSULE ORAL 2 TIMES DAILY PRN
Status: DISCONTINUED | OUTPATIENT
Start: 2022-01-01 | End: 2022-01-01 | Stop reason: HOSPADM

## 2022-01-01 RX ORDER — POLYETHYLENE GLYCOL 3350 17 G/17G
17 POWDER, FOR SOLUTION ORAL DAILY
Status: DISCONTINUED | OUTPATIENT
Start: 2022-01-01 | End: 2022-01-01 | Stop reason: HOSPADM

## 2022-01-01 RX ORDER — GLYCOPYRROLATE 0.2 MG/ML
INJECTION, SOLUTION INTRAMUSCULAR; INTRAVENOUS PRN
Status: DISCONTINUED | OUTPATIENT
Start: 2022-01-01 | End: 2022-01-01

## 2022-01-01 RX ADMIN — DEXAMETHASONE SODIUM PHOSPHATE 4 MG: 4 INJECTION, SOLUTION INTRA-ARTICULAR; INTRALESIONAL; INTRAMUSCULAR; INTRAVENOUS; SOFT TISSUE at 12:43

## 2022-01-01 RX ADMIN — AMPICILLIN SODIUM AND SULBACTAM SODIUM 3 G: 2; 1 INJECTION, POWDER, FOR SOLUTION INTRAMUSCULAR; INTRAVENOUS at 06:19

## 2022-01-01 RX ADMIN — OLOPATADINE HYDROCHLORIDE 1 DROP: 1 SOLUTION OPHTHALMIC at 03:01

## 2022-01-01 RX ADMIN — ACETAMINOPHEN 650 MG: 650 SUPPOSITORY RECTAL at 17:40

## 2022-01-01 RX ADMIN — CETIRIZINE HYDROCHLORIDE 10 MG: 10 TABLET, FILM COATED ORAL at 09:17

## 2022-01-01 RX ADMIN — OLOPATADINE HYDROCHLORIDE 1 DROP: 1 SOLUTION OPHTHALMIC at 21:48

## 2022-01-01 RX ADMIN — TIZANIDINE 4 MG: 4 TABLET ORAL at 09:04

## 2022-01-01 RX ADMIN — Medication 1000 UNITS: at 09:39

## 2022-01-01 RX ADMIN — SUCRALFATE 1 G: 1 TABLET ORAL at 08:58

## 2022-01-01 RX ADMIN — OLOPATADINE HYDROCHLORIDE 1 DROP: 1 SOLUTION OPHTHALMIC at 08:52

## 2022-01-01 RX ADMIN — ALBUTEROL SULFATE 2.5 MG: 2.5 SOLUTION RESPIRATORY (INHALATION) at 21:27

## 2022-01-01 RX ADMIN — TIZANIDINE 4 MG: 4 TABLET ORAL at 20:08

## 2022-01-01 RX ADMIN — SUCRALFATE 1 G: 1 TABLET ORAL at 16:58

## 2022-01-01 RX ADMIN — DICLOFENAC SODIUM 1 G: 10 GEL TOPICAL at 10:03

## 2022-01-01 RX ADMIN — POTASSIUM CHLORIDE, DEXTROSE MONOHYDRATE AND SODIUM CHLORIDE: 150; 5; 450 INJECTION, SOLUTION INTRAVENOUS at 11:34

## 2022-01-01 RX ADMIN — PANTOPRAZOLE SODIUM 20 MG: 20 TABLET, DELAYED RELEASE ORAL at 21:32

## 2022-01-01 RX ADMIN — ALBUTEROL SULFATE 2.5 MG: 2.5 SOLUTION RESPIRATORY (INHALATION) at 07:54

## 2022-01-01 RX ADMIN — AMPICILLIN SODIUM AND SULBACTAM SODIUM 3 G: 2; 1 INJECTION, POWDER, FOR SOLUTION INTRAMUSCULAR; INTRAVENOUS at 12:21

## 2022-01-01 RX ADMIN — DICLOFENAC SODIUM 1 G: 10 GEL TOPICAL at 22:23

## 2022-01-01 RX ADMIN — PANTOPRAZOLE SODIUM 20 MG: 20 TABLET, DELAYED RELEASE ORAL at 08:58

## 2022-01-01 RX ADMIN — OLOPATADINE HYDROCHLORIDE 1 DROP: 1 SOLUTION OPHTHALMIC at 08:44

## 2022-01-01 RX ADMIN — METOCLOPRAMIDE HYDROCHLORIDE 10 MG: 5 INJECTION INTRAMUSCULAR; INTRAVENOUS at 13:34

## 2022-01-01 RX ADMIN — ALBUTEROL SULFATE 2.5 MG: 2.5 SOLUTION RESPIRATORY (INHALATION) at 08:12

## 2022-01-01 RX ADMIN — LORAZEPAM 0.5 MG: 2 INJECTION INTRAMUSCULAR at 21:10

## 2022-01-01 RX ADMIN — OLOPATADINE HYDROCHLORIDE 1 DROP: 1 SOLUTION/ DROPS OPHTHALMIC at 20:26

## 2022-01-01 RX ADMIN — AMPICILLIN SODIUM AND SULBACTAM SODIUM 3 G: 2; 1 INJECTION, POWDER, FOR SOLUTION INTRAMUSCULAR; INTRAVENOUS at 13:46

## 2022-01-01 RX ADMIN — IOPAMIDOL 45 ML: 755 INJECTION, SOLUTION INTRAVENOUS at 21:03

## 2022-01-01 RX ADMIN — STANDARDIZED SENNA CONCENTRATE 2 TABLET: 8.6 TABLET ORAL at 11:05

## 2022-01-01 RX ADMIN — MIDAZOLAM 1 MG: 1 INJECTION INTRAMUSCULAR; INTRAVENOUS at 12:36

## 2022-01-01 RX ADMIN — POTASSIUM CHLORIDE 10 MEQ: 7.46 INJECTION, SOLUTION INTRAVENOUS at 15:26

## 2022-01-01 RX ADMIN — TIZANIDINE 4 MG: 4 TABLET ORAL at 21:34

## 2022-01-01 RX ADMIN — SUCRALFATE 1 G: 1 TABLET ORAL at 17:58

## 2022-01-01 RX ADMIN — BARIUM SULFATE 20 ML: 400 SUSPENSION ORAL at 13:30

## 2022-01-01 RX ADMIN — Medication 1000 UNITS: at 09:26

## 2022-01-01 RX ADMIN — ALBUTEROL SULFATE 2.5 MG: 2.5 SOLUTION RESPIRATORY (INHALATION) at 07:19

## 2022-01-01 RX ADMIN — ONDANSETRON 4 MG: 2 INJECTION INTRAMUSCULAR; INTRAVENOUS at 09:01

## 2022-01-01 RX ADMIN — CITALOPRAM HYDROBROMIDE 40 MG: 20 TABLET ORAL at 09:19

## 2022-01-01 RX ADMIN — SODIUM CHLORIDE, POTASSIUM CHLORIDE, SODIUM LACTATE AND CALCIUM CHLORIDE: 600; 310; 30; 20 INJECTION, SOLUTION INTRAVENOUS at 02:19

## 2022-01-01 RX ADMIN — PANTOPRAZOLE SODIUM 40 MG: 40 INJECTION, POWDER, FOR SOLUTION INTRAVENOUS at 12:57

## 2022-01-01 RX ADMIN — AMPICILLIN SODIUM AND SULBACTAM SODIUM 3 G: 2; 1 INJECTION, POWDER, FOR SOLUTION INTRAMUSCULAR; INTRAVENOUS at 11:12

## 2022-01-01 RX ADMIN — OLOPATADINE HYDROCHLORIDE 1 DROP: 1 SOLUTION OPHTHALMIC at 11:45

## 2022-01-01 RX ADMIN — PROCHLORPERAZINE EDISYLATE 10 MG: 5 INJECTION INTRAMUSCULAR; INTRAVENOUS at 21:37

## 2022-01-01 RX ADMIN — ALBUTEROL SULFATE 2.5 MG: 2.5 SOLUTION RESPIRATORY (INHALATION) at 21:03

## 2022-01-01 RX ADMIN — POLYETHYLENE GLYCOL 3350 17 G: 17 POWDER, FOR SOLUTION ORAL at 10:13

## 2022-01-01 RX ADMIN — OLOPATADINE HYDROCHLORIDE 1 DROP: 1 SOLUTION OPHTHALMIC at 20:12

## 2022-01-01 RX ADMIN — TIZANIDINE 4 MG: 4 TABLET ORAL at 08:50

## 2022-01-01 RX ADMIN — OLANZAPINE 2.5 MG: 2.5 TABLET, FILM COATED ORAL at 22:04

## 2022-01-01 RX ADMIN — AMPICILLIN SODIUM AND SULBACTAM SODIUM 3 G: 2; 1 INJECTION, POWDER, FOR SOLUTION INTRAMUSCULAR; INTRAVENOUS at 05:43

## 2022-01-01 RX ADMIN — FLUTICASONE PROPIONATE 2 SPRAY: 50 SPRAY, METERED NASAL at 08:15

## 2022-01-01 RX ADMIN — OLOPATADINE HYDROCHLORIDE 1 DROP: 1 SOLUTION OPHTHALMIC at 20:14

## 2022-01-01 RX ADMIN — AMPICILLIN SODIUM AND SULBACTAM SODIUM 3 G: 2; 1 INJECTION, POWDER, FOR SOLUTION INTRAMUSCULAR; INTRAVENOUS at 02:25

## 2022-01-01 RX ADMIN — TIZANIDINE 4 MG: 4 TABLET ORAL at 16:59

## 2022-01-01 RX ADMIN — CETIRIZINE HYDROCHLORIDE 10 MG: 10 TABLET, FILM COATED ORAL at 08:50

## 2022-01-01 RX ADMIN — TIZANIDINE 4 MG: 4 TABLET ORAL at 13:59

## 2022-01-01 RX ADMIN — MONTELUKAST 10 MG: 10 TABLET, FILM COATED ORAL at 00:09

## 2022-01-01 RX ADMIN — CETIRIZINE HYDROCHLORIDE 10 MG: 10 TABLET, FILM COATED ORAL at 09:19

## 2022-01-01 RX ADMIN — ALBUTEROL SULFATE 2.5 MG: 2.5 SOLUTION RESPIRATORY (INHALATION) at 11:21

## 2022-01-01 RX ADMIN — PANTOPRAZOLE SODIUM 20 MG: 20 TABLET, DELAYED RELEASE ORAL at 09:17

## 2022-01-01 RX ADMIN — FLUTICASONE PROPIONATE 2 SPRAY: 50 SPRAY, METERED NASAL at 12:28

## 2022-01-01 RX ADMIN — OLANZAPINE 2.5 MG: 2.5 TABLET, FILM COATED ORAL at 01:31

## 2022-01-01 RX ADMIN — AMPICILLIN SODIUM AND SULBACTAM SODIUM 3 G: 2; 1 INJECTION, POWDER, FOR SOLUTION INTRAMUSCULAR; INTRAVENOUS at 18:04

## 2022-01-01 RX ADMIN — POTASSIUM CHLORIDE 10 MEQ: 7.46 INJECTION, SOLUTION INTRAVENOUS at 14:16

## 2022-01-01 RX ADMIN — DICLOFENAC SODIUM 1 G: 10 GEL TOPICAL at 08:15

## 2022-01-01 RX ADMIN — LORAZEPAM 0.5 MG: 2 INJECTION INTRAMUSCULAR at 16:37

## 2022-01-01 RX ADMIN — PROPOFOL 100 MCG/KG/MIN: 10 INJECTION, EMULSION INTRAVENOUS at 12:38

## 2022-01-01 RX ADMIN — PANTOPRAZOLE SODIUM 40 MG: 40 INJECTION, POWDER, FOR SOLUTION INTRAVENOUS at 20:08

## 2022-01-01 RX ADMIN — TIZANIDINE 4 MG: 4 TABLET ORAL at 09:26

## 2022-01-01 RX ADMIN — ONDANSETRON 4 MG: 2 INJECTION INTRAMUSCULAR; INTRAVENOUS at 15:48

## 2022-01-01 RX ADMIN — MONTELUKAST 10 MG: 10 TABLET, FILM COATED ORAL at 21:37

## 2022-01-01 RX ADMIN — ONDANSETRON 4 MG: 4 TABLET, ORALLY DISINTEGRATING ORAL at 17:33

## 2022-01-01 RX ADMIN — ALBUTEROL SULFATE 2.5 MG: 2.5 SOLUTION RESPIRATORY (INHALATION) at 08:37

## 2022-01-01 RX ADMIN — PROCHLORPERAZINE 25 MG: 25 SUPPOSITORY RECTAL at 10:52

## 2022-01-01 RX ADMIN — TIZANIDINE 4 MG: 4 TABLET ORAL at 08:40

## 2022-01-01 RX ADMIN — CETIRIZINE HYDROCHLORIDE 10 MG: 10 TABLET, FILM COATED ORAL at 09:49

## 2022-01-01 RX ADMIN — SODIUM CHLORIDE 500 ML: 9 INJECTION, SOLUTION INTRAVENOUS at 04:00

## 2022-01-01 RX ADMIN — FLUTICASONE PROPIONATE 2 SPRAY: 50 SPRAY, METERED NASAL at 08:49

## 2022-01-01 RX ADMIN — AMPICILLIN SODIUM AND SULBACTAM SODIUM 3 G: 2; 1 INJECTION, POWDER, FOR SOLUTION INTRAMUSCULAR; INTRAVENOUS at 08:08

## 2022-01-01 RX ADMIN — CETIRIZINE HYDROCHLORIDE 10 MG: 10 TABLET, FILM COATED ORAL at 08:08

## 2022-01-01 RX ADMIN — STANDARDIZED SENNA CONCENTRATE 2 TABLET: 8.6 TABLET ORAL at 22:23

## 2022-01-01 RX ADMIN — ONDANSETRON 4 MG: 2 INJECTION INTRAMUSCULAR; INTRAVENOUS at 21:55

## 2022-01-01 RX ADMIN — AMOXICILLIN AND CLAVULANATE POTASSIUM 1 TABLET: 875; 125 TABLET, FILM COATED ORAL at 09:16

## 2022-01-01 RX ADMIN — CETIRIZINE HYDROCHLORIDE 10 MG: 10 TABLET, FILM COATED ORAL at 09:16

## 2022-01-01 RX ADMIN — OLANZAPINE 2.5 MG: 2.5 TABLET, FILM COATED ORAL at 21:37

## 2022-01-01 RX ADMIN — ONDANSETRON 4 MG: 2 INJECTION INTRAMUSCULAR; INTRAVENOUS at 15:51

## 2022-01-01 RX ADMIN — DICLOFENAC SODIUM 1 G: 10 GEL TOPICAL at 20:15

## 2022-01-01 RX ADMIN — MIDAZOLAM 1 MG: 1 INJECTION INTRAMUSCULAR; INTRAVENOUS at 12:31

## 2022-01-01 RX ADMIN — CETIRIZINE HYDROCHLORIDE 10 MG: 10 TABLET, FILM COATED ORAL at 09:39

## 2022-01-01 RX ADMIN — MONTELUKAST 10 MG: 10 TABLET, FILM COATED ORAL at 21:41

## 2022-01-01 RX ADMIN — ALBUTEROL SULFATE 2.5 MG: 2.5 SOLUTION RESPIRATORY (INHALATION) at 19:53

## 2022-01-01 RX ADMIN — DEXTROSE AND SODIUM CHLORIDE: 5; 450 INJECTION, SOLUTION INTRAVENOUS at 13:27

## 2022-01-01 RX ADMIN — OLOPATADINE HYDROCHLORIDE 1 DROP: 1 SOLUTION OPHTHALMIC at 11:13

## 2022-01-01 RX ADMIN — STANDARDIZED SENNA CONCENTRATE 2 TABLET: 8.6 TABLET ORAL at 08:08

## 2022-01-01 RX ADMIN — MAGNESIUM HYDROXIDE 30 ML: 400 SUSPENSION ORAL at 08:51

## 2022-01-01 RX ADMIN — SODIUM FLUORIDE AND POTASSIUM NITRATE: 5.8; 57.5 GEL, DENTIFRICE DENTAL at 21:36

## 2022-01-01 RX ADMIN — TIZANIDINE 4 MG: 4 TABLET ORAL at 14:03

## 2022-01-01 RX ADMIN — ONDANSETRON 4 MG: 2 INJECTION INTRAMUSCULAR; INTRAVENOUS at 11:06

## 2022-01-01 RX ADMIN — ACETAMINOPHEN 650 MG: 650 SUPPOSITORY RECTAL at 10:20

## 2022-01-01 RX ADMIN — AMOXICILLIN AND CLAVULANATE POTASSIUM 1 TABLET: 875; 125 TABLET, FILM COATED ORAL at 00:09

## 2022-01-01 RX ADMIN — SODIUM CHLORIDE, POTASSIUM CHLORIDE, SODIUM LACTATE AND CALCIUM CHLORIDE: 600; 310; 30; 20 INJECTION, SOLUTION INTRAVENOUS at 16:45

## 2022-01-01 RX ADMIN — OLANZAPINE 2.5 MG: 2.5 TABLET, FILM COATED ORAL at 00:09

## 2022-01-01 RX ADMIN — ONDANSETRON 4 MG: 4 TABLET, ORALLY DISINTEGRATING ORAL at 08:19

## 2022-01-01 RX ADMIN — POTASSIUM CHLORIDE, DEXTROSE MONOHYDRATE AND SODIUM CHLORIDE: 150; 5; 450 INJECTION, SOLUTION INTRAVENOUS at 18:48

## 2022-01-01 RX ADMIN — ACETAMINOPHEN 650 MG: 650 SUPPOSITORY RECTAL at 00:04

## 2022-01-01 RX ADMIN — ALBUTEROL SULFATE 2.5 MG: 2.5 SOLUTION RESPIRATORY (INHALATION) at 07:58

## 2022-01-01 RX ADMIN — Medication 1000 UNITS: at 08:07

## 2022-01-01 RX ADMIN — ALBUTEROL SULFATE 2.5 MG: 2.5 SOLUTION RESPIRATORY (INHALATION) at 20:45

## 2022-01-01 RX ADMIN — TIZANIDINE 4 MG: 4 TABLET ORAL at 08:08

## 2022-01-01 RX ADMIN — SODIUM CHLORIDE, POTASSIUM CHLORIDE, SODIUM LACTATE AND CALCIUM CHLORIDE 1000 ML: 600; 310; 30; 20 INJECTION, SOLUTION INTRAVENOUS at 15:49

## 2022-01-01 RX ADMIN — MONTELUKAST 10 MG: 10 TABLET, FILM COATED ORAL at 21:17

## 2022-01-01 RX ADMIN — POTASSIUM CHLORIDE, DEXTROSE MONOHYDRATE AND SODIUM CHLORIDE: 150; 5; 450 INJECTION, SOLUTION INTRAVENOUS at 23:28

## 2022-01-01 RX ADMIN — BISACODYL 10 MG: 10 SUPPOSITORY RECTAL at 12:24

## 2022-01-01 RX ADMIN — SODIUM CHLORIDE 52 ML: 9 INJECTION, SOLUTION INTRAVENOUS at 21:04

## 2022-01-01 RX ADMIN — TIZANIDINE 4 MG: 4 TABLET ORAL at 11:45

## 2022-01-01 RX ADMIN — LORAZEPAM 0.5 MG: 2 INJECTION INTRAMUSCULAR at 19:31

## 2022-01-01 RX ADMIN — OLOPATADINE HYDROCHLORIDE 1 DROP: 1 SOLUTION/ DROPS OPHTHALMIC at 11:12

## 2022-01-01 RX ADMIN — SODIUM CHLORIDE, POTASSIUM CHLORIDE, SODIUM LACTATE AND CALCIUM CHLORIDE: 600; 310; 30; 20 INJECTION, SOLUTION INTRAVENOUS at 10:55

## 2022-01-01 RX ADMIN — SODIUM CHLORIDE 1000 ML: 9 INJECTION, SOLUTION INTRAVENOUS at 20:02

## 2022-01-01 RX ADMIN — POTASSIUM CHLORIDE 10 MEQ: 7.46 INJECTION, SOLUTION INTRAVENOUS at 20:12

## 2022-01-01 RX ADMIN — OLOPATADINE HYDROCHLORIDE 1 DROP: 1 SOLUTION OPHTHALMIC at 22:23

## 2022-01-01 RX ADMIN — CETIRIZINE HYDROCHLORIDE 10 MG: 10 TABLET, FILM COATED ORAL at 09:26

## 2022-01-01 RX ADMIN — CITALOPRAM HYDROBROMIDE 40 MG: 20 TABLET ORAL at 08:58

## 2022-01-01 RX ADMIN — DICLOFENAC SODIUM 1 G: 10 GEL TOPICAL at 01:24

## 2022-01-01 RX ADMIN — OLOPATADINE HYDROCHLORIDE 1 DROP: 1 SOLUTION OPHTHALMIC at 21:12

## 2022-01-01 RX ADMIN — MONTELUKAST 10 MG: 10 TABLET, FILM COATED ORAL at 21:34

## 2022-01-01 RX ADMIN — Medication 1 MG: at 22:24

## 2022-01-01 RX ADMIN — LORAZEPAM 0.5 MG: 0.5 TABLET ORAL at 02:17

## 2022-01-01 RX ADMIN — ALBUTEROL SULFATE 2.5 MG: 2.5 SOLUTION RESPIRATORY (INHALATION) at 09:26

## 2022-01-01 RX ADMIN — DICLOFENAC SODIUM 1 G: 10 GEL TOPICAL at 11:13

## 2022-01-01 RX ADMIN — OLOPATADINE HYDROCHLORIDE 1 DROP: 1 SOLUTION OPHTHALMIC at 20:49

## 2022-01-01 RX ADMIN — OLANZAPINE 2.5 MG: 2.5 TABLET, FILM COATED ORAL at 21:48

## 2022-01-01 RX ADMIN — DICLOFENAC SODIUM 1 G: 10 GEL TOPICAL at 08:49

## 2022-01-01 RX ADMIN — STANDARDIZED SENNA CONCENTRATE 1 TABLET: 8.6 TABLET ORAL at 21:25

## 2022-01-01 RX ADMIN — AMPICILLIN SODIUM AND SULBACTAM SODIUM 3 G: 2; 1 INJECTION, POWDER, FOR SOLUTION INTRAMUSCULAR; INTRAVENOUS at 12:13

## 2022-01-01 RX ADMIN — TIZANIDINE 4 MG: 4 TABLET ORAL at 21:17

## 2022-01-01 RX ADMIN — PANTOPRAZOLE SODIUM 40 MG: 40 INJECTION, POWDER, FOR SOLUTION INTRAVENOUS at 01:31

## 2022-01-01 RX ADMIN — TIZANIDINE 4 MG: 4 TABLET ORAL at 21:25

## 2022-01-01 RX ADMIN — OLOPATADINE HYDROCHLORIDE 1 DROP: 1 SOLUTION/ DROPS OPHTHALMIC at 00:11

## 2022-01-01 RX ADMIN — ONDANSETRON 4 MG: 2 INJECTION INTRAMUSCULAR; INTRAVENOUS at 01:29

## 2022-01-01 RX ADMIN — PANTOPRAZOLE SODIUM 40 MG: 40 INJECTION, POWDER, FOR SOLUTION INTRAVENOUS at 10:16

## 2022-01-01 RX ADMIN — ONDANSETRON 4 MG: 2 INJECTION INTRAMUSCULAR; INTRAVENOUS at 08:40

## 2022-01-01 RX ADMIN — LORAZEPAM 0.5 MG: 2 INJECTION INTRAMUSCULAR at 09:24

## 2022-01-01 RX ADMIN — CETIRIZINE HYDROCHLORIDE 10 MG: 10 TABLET, FILM COATED ORAL at 08:58

## 2022-01-01 RX ADMIN — LIDOCAINE HYDROCHLORIDE 35 MG: 10 INJECTION, SOLUTION EPIDURAL; INFILTRATION; INTRACAUDAL; PERINEURAL at 12:47

## 2022-01-01 RX ADMIN — PANTOPRAZOLE SODIUM 40 MG: 40 INJECTION, POWDER, FOR SOLUTION INTRAVENOUS at 08:29

## 2022-01-01 RX ADMIN — PANTOPRAZOLE SODIUM 40 MG: 40 TABLET, DELAYED RELEASE ORAL at 15:48

## 2022-01-01 RX ADMIN — POTASSIUM CHLORIDE, DEXTROSE MONOHYDRATE AND SODIUM CHLORIDE: 150; 5; 450 INJECTION, SOLUTION INTRAVENOUS at 10:57

## 2022-01-01 RX ADMIN — SUCRALFATE 1 G: 1 TABLET ORAL at 17:36

## 2022-01-01 RX ADMIN — CITALOPRAM HYDROBROMIDE 40 MG: 20 TABLET ORAL at 10:11

## 2022-01-01 RX ADMIN — LORAZEPAM 0.5 MG: 2 INJECTION INTRAMUSCULAR at 10:20

## 2022-01-01 RX ADMIN — Medication 20 MG: at 12:38

## 2022-01-01 RX ADMIN — LORAZEPAM 0.5 MG: 2 INJECTION INTRAMUSCULAR at 06:52

## 2022-01-01 RX ADMIN — FLUTICASONE PROPIONATE 2 SPRAY: 50 SPRAY, METERED NASAL at 14:05

## 2022-01-01 RX ADMIN — ONDANSETRON 4 MG: 2 INJECTION INTRAMUSCULAR; INTRAVENOUS at 09:15

## 2022-01-01 RX ADMIN — AMOXICILLIN AND CLAVULANATE POTASSIUM 1 TABLET: 875; 125 TABLET, FILM COATED ORAL at 22:04

## 2022-01-01 RX ADMIN — STANDARDIZED SENNA CONCENTRATE 2 TABLET: 8.6 TABLET ORAL at 08:50

## 2022-01-01 RX ADMIN — ALBUTEROL SULFATE 2.5 MG: 2.5 SOLUTION RESPIRATORY (INHALATION) at 07:32

## 2022-01-01 RX ADMIN — ALBUTEROL SULFATE 2.5 MG: 2.5 SOLUTION RESPIRATORY (INHALATION) at 20:15

## 2022-01-01 RX ADMIN — OLOPATADINE HYDROCHLORIDE 1 DROP: 1 SOLUTION/ DROPS OPHTHALMIC at 20:30

## 2022-01-01 RX ADMIN — DICLOFENAC SODIUM 1 G: 10 GEL TOPICAL at 11:45

## 2022-01-01 RX ADMIN — PANTOPRAZOLE SODIUM 40 MG: 40 INJECTION, POWDER, FOR SOLUTION INTRAVENOUS at 07:40

## 2022-01-01 RX ADMIN — DICLOFENAC SODIUM 1 G: 10 GEL TOPICAL at 20:13

## 2022-01-01 RX ADMIN — AMPICILLIN SODIUM AND SULBACTAM SODIUM 3 G: 2; 1 INJECTION, POWDER, FOR SOLUTION INTRAMUSCULAR; INTRAVENOUS at 04:45

## 2022-01-01 RX ADMIN — SODIUM CHLORIDE: 9 INJECTION, SOLUTION INTRAVENOUS at 15:38

## 2022-01-01 RX ADMIN — LORAZEPAM 0.5 MG: 2 INJECTION INTRAMUSCULAR at 07:40

## 2022-01-01 RX ADMIN — DEXTROSE AND SODIUM CHLORIDE: 5; 450 INJECTION, SOLUTION INTRAVENOUS at 17:59

## 2022-01-01 RX ADMIN — STANDARDIZED SENNA CONCENTRATE 2 TABLET: 8.6 TABLET ORAL at 10:12

## 2022-01-01 RX ADMIN — POTASSIUM CHLORIDE, DEXTROSE MONOHYDRATE AND SODIUM CHLORIDE: 150; 5; 450 INJECTION, SOLUTION INTRAVENOUS at 10:55

## 2022-01-01 RX ADMIN — POTASSIUM CHLORIDE, DEXTROSE MONOHYDRATE AND SODIUM CHLORIDE: 150; 5; 450 INJECTION, SOLUTION INTRAVENOUS at 01:22

## 2022-01-01 RX ADMIN — AMPICILLIN SODIUM AND SULBACTAM SODIUM 3 G: 2; 1 INJECTION, POWDER, FOR SOLUTION INTRAMUSCULAR; INTRAVENOUS at 20:09

## 2022-01-01 RX ADMIN — PANTOPRAZOLE SODIUM 40 MG: 40 INJECTION, POWDER, FOR SOLUTION INTRAVENOUS at 08:40

## 2022-01-01 RX ADMIN — MAGNESIUM HYDROXIDE 30 ML: 400 SUSPENSION ORAL at 18:42

## 2022-01-01 RX ADMIN — POTASSIUM CHLORIDE 10 MEQ: 7.46 INJECTION, SOLUTION INTRAVENOUS at 13:45

## 2022-01-01 RX ADMIN — ALBUTEROL SULFATE 2.5 MG: 2.5 SOLUTION RESPIRATORY (INHALATION) at 20:25

## 2022-01-01 RX ADMIN — SODIUM CHLORIDE 500 ML: 9 INJECTION, SOLUTION INTRAVENOUS at 06:04

## 2022-01-01 RX ADMIN — STANDARDIZED SENNA CONCENTRATE 2 TABLET: 8.6 TABLET ORAL at 08:40

## 2022-01-01 RX ADMIN — LORAZEPAM 0.5 MG: 2 INJECTION INTRAMUSCULAR at 15:48

## 2022-01-01 RX ADMIN — ALBUTEROL SULFATE 2.5 MG: 2.5 SOLUTION RESPIRATORY (INHALATION) at 08:35

## 2022-01-01 RX ADMIN — OLOPATADINE HYDROCHLORIDE 1 DROP: 1 SOLUTION/ DROPS OPHTHALMIC at 09:24

## 2022-01-01 RX ADMIN — OLANZAPINE 2.5 MG: 2.5 TABLET, FILM COATED ORAL at 21:34

## 2022-01-01 RX ADMIN — LORAZEPAM 0.5 MG: 2 INJECTION INTRAMUSCULAR at 18:05

## 2022-01-01 RX ADMIN — PANTOPRAZOLE SODIUM 40 MG: 40 INJECTION, POWDER, FOR SOLUTION INTRAVENOUS at 20:26

## 2022-01-01 RX ADMIN — LORAZEPAM 0.5 MG: 2 INJECTION INTRAMUSCULAR; INTRAVENOUS at 19:41

## 2022-01-01 RX ADMIN — STANDARDIZED SENNA CONCENTRATE 2 TABLET: 8.6 TABLET ORAL at 09:49

## 2022-01-01 RX ADMIN — ACETAMINOPHEN 650 MG: 650 SUPPOSITORY RECTAL at 21:06

## 2022-01-01 RX ADMIN — AMOXICILLIN AND CLAVULANATE POTASSIUM 1 TABLET: 875; 125 TABLET, FILM COATED ORAL at 20:26

## 2022-01-01 RX ADMIN — AMOXICILLIN AND CLAVULANATE POTASSIUM 1 TABLET: 875; 125 TABLET, FILM COATED ORAL at 22:11

## 2022-01-01 RX ADMIN — POTASSIUM CHLORIDE 10 MEQ: 7.46 INJECTION, SOLUTION INTRAVENOUS at 21:24

## 2022-01-01 RX ADMIN — TIZANIDINE 4 MG: 4 TABLET ORAL at 22:23

## 2022-01-01 RX ADMIN — DICLOFENAC SODIUM 1 G: 10 GEL TOPICAL at 21:46

## 2022-01-01 RX ADMIN — SODIUM CHLORIDE, POTASSIUM CHLORIDE, SODIUM LACTATE AND CALCIUM CHLORIDE: 600; 310; 30; 20 INJECTION, SOLUTION INTRAVENOUS at 17:26

## 2022-01-01 RX ADMIN — OLOPATADINE HYDROCHLORIDE 1 DROP: 1 SOLUTION OPHTHALMIC at 09:21

## 2022-01-01 RX ADMIN — SUCRALFATE 1 G: 1 TABLET ORAL at 22:04

## 2022-01-01 RX ADMIN — ALBUTEROL SULFATE 2.5 MG: 2.5 SOLUTION RESPIRATORY (INHALATION) at 08:30

## 2022-01-01 RX ADMIN — FLUTICASONE PROPIONATE 2 SPRAY: 50 SPRAY, METERED NASAL at 10:03

## 2022-01-01 RX ADMIN — PANTOPRAZOLE SODIUM 40 MG: 40 INJECTION, POWDER, FOR SOLUTION INTRAVENOUS at 12:31

## 2022-01-01 RX ADMIN — ALBUTEROL SULFATE 2.5 MG: 2.5 SOLUTION RESPIRATORY (INHALATION) at 07:59

## 2022-01-01 RX ADMIN — AMOXICILLIN AND CLAVULANATE POTASSIUM 1 TABLET: 875; 125 TABLET, FILM COATED ORAL at 08:58

## 2022-01-01 RX ADMIN — OLOPATADINE HYDROCHLORIDE 1 DROP: 1 SOLUTION/ DROPS OPHTHALMIC at 10:12

## 2022-01-01 RX ADMIN — LORAZEPAM 0.5 MG: 2 INJECTION INTRAMUSCULAR; INTRAVENOUS at 02:01

## 2022-01-01 RX ADMIN — TIZANIDINE 4 MG: 4 TABLET ORAL at 21:31

## 2022-01-01 RX ADMIN — POLYETHYLENE GLYCOL 3350 17 G: 17 POWDER, FOR SOLUTION ORAL at 08:48

## 2022-01-01 RX ADMIN — MONTELUKAST 10 MG: 10 TABLET, FILM COATED ORAL at 22:21

## 2022-01-01 RX ADMIN — TIZANIDINE 4 MG: 4 TABLET ORAL at 22:05

## 2022-01-01 RX ADMIN — DICLOFENAC SODIUM 1 G: 10 GEL TOPICAL at 09:19

## 2022-01-01 RX ADMIN — MONTELUKAST 10 MG: 10 TABLET, FILM COATED ORAL at 21:47

## 2022-01-01 RX ADMIN — AMPICILLIN SODIUM AND SULBACTAM SODIUM 3 G: 2; 1 INJECTION, POWDER, FOR SOLUTION INTRAMUSCULAR; INTRAVENOUS at 16:56

## 2022-01-01 RX ADMIN — POTASSIUM CHLORIDE, DEXTROSE MONOHYDRATE AND SODIUM CHLORIDE: 150; 5; 450 INJECTION, SOLUTION INTRAVENOUS at 21:37

## 2022-01-01 RX ADMIN — OLANZAPINE 2.5 MG: 2.5 TABLET, FILM COATED ORAL at 21:41

## 2022-01-01 RX ADMIN — POTASSIUM CHLORIDE, DEXTROSE MONOHYDRATE AND SODIUM CHLORIDE: 150; 5; 450 INJECTION, SOLUTION INTRAVENOUS at 05:23

## 2022-01-01 RX ADMIN — FLUTICASONE PROPIONATE 2 SPRAY: 50 SPRAY, METERED NASAL at 11:13

## 2022-01-01 RX ADMIN — TIZANIDINE 4 MG: 4 TABLET ORAL at 09:17

## 2022-01-01 RX ADMIN — ALBUTEROL SULFATE 2.5 MG: 2.5 SOLUTION RESPIRATORY (INHALATION) at 08:36

## 2022-01-01 RX ADMIN — PANTOPRAZOLE SODIUM 40 MG: 40 TABLET, DELAYED RELEASE ORAL at 08:08

## 2022-01-01 RX ADMIN — PANTOPRAZOLE SODIUM 40 MG: 40 INJECTION, POWDER, FOR SOLUTION INTRAVENOUS at 08:51

## 2022-01-01 RX ADMIN — TIZANIDINE 4 MG: 4 TABLET ORAL at 19:56

## 2022-01-01 RX ADMIN — TIZANIDINE 4 MG: 4 TABLET ORAL at 10:58

## 2022-01-01 RX ADMIN — STANDARDIZED SENNA CONCENTRATE 2 TABLET: 8.6 TABLET ORAL at 21:43

## 2022-01-01 RX ADMIN — OLOPATADINE HYDROCHLORIDE 1 DROP: 1 SOLUTION OPHTHALMIC at 09:19

## 2022-01-01 RX ADMIN — POTASSIUM CHLORIDE, DEXTROSE MONOHYDRATE AND SODIUM CHLORIDE: 150; 5; 450 INJECTION, SOLUTION INTRAVENOUS at 14:03

## 2022-01-01 RX ADMIN — ACETAMINOPHEN 650 MG: 650 SUPPOSITORY RECTAL at 09:22

## 2022-01-01 RX ADMIN — OLANZAPINE 2.5 MG: 2.5 TABLET, FILM COATED ORAL at 21:30

## 2022-01-01 RX ADMIN — AMPICILLIN SODIUM AND SULBACTAM SODIUM 3 G: 2; 1 INJECTION, POWDER, FOR SOLUTION INTRAMUSCULAR; INTRAVENOUS at 17:01

## 2022-01-01 RX ADMIN — STANDARDIZED SENNA CONCENTRATE 2 TABLET: 8.6 TABLET ORAL at 22:05

## 2022-01-01 RX ADMIN — POTASSIUM CHLORIDE 10 MEQ: 7.46 INJECTION, SOLUTION INTRAVENOUS at 12:31

## 2022-01-01 RX ADMIN — ONDANSETRON 4 MG: 2 INJECTION INTRAMUSCULAR; INTRAVENOUS at 22:30

## 2022-01-01 RX ADMIN — TIZANIDINE 4 MG: 4 TABLET ORAL at 16:56

## 2022-01-01 RX ADMIN — TIZANIDINE 4 MG: 4 TABLET ORAL at 08:16

## 2022-01-01 RX ADMIN — ALBUTEROL SULFATE 2.5 MG: 2.5 SOLUTION RESPIRATORY (INHALATION) at 00:11

## 2022-01-01 RX ADMIN — METOCLOPRAMIDE HYDROCHLORIDE 10 MG: 5 INJECTION INTRAMUSCULAR; INTRAVENOUS at 10:49

## 2022-01-01 RX ADMIN — OLOPATADINE HYDROCHLORIDE 1 DROP: 1 SOLUTION OPHTHALMIC at 20:15

## 2022-01-01 RX ADMIN — ONDANSETRON 4 MG: 2 INJECTION INTRAMUSCULAR; INTRAVENOUS at 10:29

## 2022-01-01 RX ADMIN — LORAZEPAM 0.5 MG: 2 INJECTION INTRAMUSCULAR; INTRAVENOUS at 08:50

## 2022-01-01 RX ADMIN — ALBUTEROL SULFATE 2.5 MG: 2.5 SOLUTION RESPIRATORY (INHALATION) at 21:14

## 2022-01-01 RX ADMIN — SODIUM CHLORIDE: 9 INJECTION, SOLUTION INTRAVENOUS at 04:49

## 2022-01-01 RX ADMIN — PANTOPRAZOLE SODIUM 20 MG: 20 TABLET, DELAYED RELEASE ORAL at 22:05

## 2022-01-01 RX ADMIN — BARIUM SULFATE 20 ML: 400 SUSPENSION ORAL at 13:31

## 2022-01-01 RX ADMIN — ONDANSETRON 4 MG: 2 INJECTION INTRAMUSCULAR; INTRAVENOUS at 20:03

## 2022-01-01 RX ADMIN — AMPICILLIN SODIUM AND SULBACTAM SODIUM 3 G: 2; 1 INJECTION, POWDER, FOR SOLUTION INTRAMUSCULAR; INTRAVENOUS at 23:57

## 2022-01-01 RX ADMIN — OLOPATADINE HYDROCHLORIDE 1 DROP: 1 SOLUTION OPHTHALMIC at 10:45

## 2022-01-01 RX ADMIN — AMPICILLIN SODIUM AND SULBACTAM SODIUM 3 G: 2; 1 INJECTION, POWDER, FOR SOLUTION INTRAMUSCULAR; INTRAVENOUS at 01:28

## 2022-01-01 RX ADMIN — ONDANSETRON 4 MG: 2 INJECTION INTRAMUSCULAR; INTRAVENOUS at 07:40

## 2022-01-01 RX ADMIN — AMOXICILLIN AND CLAVULANATE POTASSIUM 1 TABLET: 875; 125 TABLET, FILM COATED ORAL at 20:08

## 2022-01-01 RX ADMIN — SODIUM FLUORIDE AND POTASSIUM NITRATE: 5.8; 57.5 GEL, DENTIFRICE DENTAL at 20:49

## 2022-01-01 RX ADMIN — SCOPALAMINE 1 PATCH: 1 PATCH, EXTENDED RELEASE TRANSDERMAL at 17:36

## 2022-01-01 RX ADMIN — TIZANIDINE 4 MG: 4 TABLET ORAL at 20:26

## 2022-01-01 RX ADMIN — MONTELUKAST 10 MG: 10 TABLET, FILM COATED ORAL at 22:05

## 2022-01-01 RX ADMIN — SODIUM FLUORIDE AND POTASSIUM NITRATE: 5.8; 57.5 GEL, DENTIFRICE DENTAL at 22:42

## 2022-01-01 RX ADMIN — OLOPATADINE HYDROCHLORIDE 1 DROP: 1 SOLUTION OPHTHALMIC at 21:36

## 2022-01-01 RX ADMIN — OLANZAPINE 2.5 MG: 2.5 TABLET, FILM COATED ORAL at 21:17

## 2022-01-01 RX ADMIN — PANTOPRAZOLE SODIUM 20 MG: 20 TABLET, DELAYED RELEASE ORAL at 09:19

## 2022-01-01 RX ADMIN — TAZOBACTAM SODIUM AND PIPERACILLIN SODIUM 4.5 G: 500; 4 INJECTION, SOLUTION INTRAVENOUS at 21:24

## 2022-01-01 RX ADMIN — LORAZEPAM 0.5 MG: 0.5 TABLET ORAL at 11:34

## 2022-01-01 RX ADMIN — FLUTICASONE PROPIONATE 2 SPRAY: 50 SPRAY, METERED NASAL at 09:19

## 2022-01-01 RX ADMIN — FLUTICASONE PROPIONATE 2 SPRAY: 50 SPRAY, METERED NASAL at 09:25

## 2022-01-01 RX ADMIN — TIZANIDINE 4 MG: 4 TABLET ORAL at 09:39

## 2022-01-01 RX ADMIN — OLOPATADINE HYDROCHLORIDE 1 DROP: 1 SOLUTION OPHTHALMIC at 08:38

## 2022-01-01 RX ADMIN — DEXTROSE AND SODIUM CHLORIDE: 5; 450 INJECTION, SOLUTION INTRAVENOUS at 01:27

## 2022-01-01 RX ADMIN — TIZANIDINE 4 MG: 4 TABLET ORAL at 17:50

## 2022-01-01 RX ADMIN — PROCHLORPERAZINE EDISYLATE 10 MG: 5 INJECTION INTRAMUSCULAR; INTRAVENOUS at 16:20

## 2022-01-01 RX ADMIN — POLYETHYLENE GLYCOL 3350 17 G: 17 POWDER, FOR SOLUTION ORAL at 22:22

## 2022-01-01 RX ADMIN — ONDANSETRON 4 MG: 2 INJECTION INTRAMUSCULAR; INTRAVENOUS at 08:30

## 2022-01-01 RX ADMIN — POTASSIUM CHLORIDE, DEXTROSE MONOHYDRATE AND SODIUM CHLORIDE: 150; 5; 450 INJECTION, SOLUTION INTRAVENOUS at 14:13

## 2022-01-01 RX ADMIN — AMPICILLIN SODIUM AND SULBACTAM SODIUM 3 G: 2; 1 INJECTION, POWDER, FOR SOLUTION INTRAMUSCULAR; INTRAVENOUS at 20:10

## 2022-01-01 RX ADMIN — FLUTICASONE PROPIONATE 2 SPRAY: 50 SPRAY, METERED NASAL at 09:20

## 2022-01-01 RX ADMIN — FLUTICASONE PROPIONATE 2 SPRAY: 50 SPRAY, METERED NASAL at 11:35

## 2022-01-01 RX ADMIN — ALBUTEROL SULFATE 2.5 MG: 2.5 SOLUTION RESPIRATORY (INHALATION) at 07:28

## 2022-01-01 RX ADMIN — ALBUTEROL SULFATE 2.5 MG: 2.5 SOLUTION RESPIRATORY (INHALATION) at 20:20

## 2022-01-01 RX ADMIN — CITALOPRAM HYDROBROMIDE 40 MG: 20 TABLET ORAL at 09:17

## 2022-01-01 RX ADMIN — Medication 1000 UNITS: at 09:17

## 2022-01-01 RX ADMIN — TIZANIDINE 4 MG: 4 TABLET ORAL at 08:48

## 2022-01-01 RX ADMIN — SUCRALFATE 1 G: 1 TABLET ORAL at 21:48

## 2022-01-01 RX ADMIN — DICLOFENAC SODIUM 1 G: 10 GEL TOPICAL at 10:45

## 2022-01-01 RX ADMIN — PANTOPRAZOLE SODIUM 40 MG: 40 INJECTION, POWDER, FOR SOLUTION INTRAVENOUS at 21:23

## 2022-01-01 RX ADMIN — OLANZAPINE 2.5 MG: 5 TABLET, ORALLY DISINTEGRATING ORAL at 21:51

## 2022-01-01 RX ADMIN — OLOPATADINE HYDROCHLORIDE 1 DROP: 1 SOLUTION/ DROPS OPHTHALMIC at 22:18

## 2022-01-01 RX ADMIN — MONTELUKAST 10 MG: 10 TABLET, FILM COATED ORAL at 21:32

## 2022-01-01 RX ADMIN — OLOPATADINE HYDROCHLORIDE 1 DROP: 1 SOLUTION OPHTHALMIC at 08:16

## 2022-01-01 RX ADMIN — OLANZAPINE 2.5 MG: 5 TABLET, ORALLY DISINTEGRATING ORAL at 22:23

## 2022-01-01 RX ADMIN — OLANZAPINE 2.5 MG: 2.5 TABLET, FILM COATED ORAL at 22:05

## 2022-01-01 RX ADMIN — ONDANSETRON 4 MG: 2 INJECTION INTRAMUSCULAR; INTRAVENOUS at 21:42

## 2022-01-01 RX ADMIN — MONTELUKAST 10 MG: 10 TABLET, FILM COATED ORAL at 21:31

## 2022-01-01 RX ADMIN — OLANZAPINE 2.5 MG: 5 TABLET, ORALLY DISINTEGRATING ORAL at 21:25

## 2022-01-01 RX ADMIN — Medication 1000 UNITS: at 08:40

## 2022-01-01 RX ADMIN — FLUTICASONE PROPIONATE 2 SPRAY: 50 SPRAY, METERED NASAL at 08:53

## 2022-01-01 RX ADMIN — POTASSIUM CHLORIDE, DEXTROSE MONOHYDRATE AND SODIUM CHLORIDE: 150; 5; 450 INJECTION, SOLUTION INTRAVENOUS at 04:43

## 2022-01-01 RX ADMIN — DICLOFENAC SODIUM 1 G: 10 GEL TOPICAL at 20:49

## 2022-01-01 RX ADMIN — Medication 1000 UNITS: at 09:49

## 2022-01-01 RX ADMIN — ALBUTEROL SULFATE 2.5 MG: 2.5 SOLUTION RESPIRATORY (INHALATION) at 07:35

## 2022-01-01 RX ADMIN — OLOPATADINE HYDROCHLORIDE 1 DROP: 1 SOLUTION OPHTHALMIC at 21:47

## 2022-01-01 RX ADMIN — CETIRIZINE HYDROCHLORIDE 10 MG: 10 TABLET, FILM COATED ORAL at 08:40

## 2022-01-01 RX ADMIN — OLANZAPINE 2.5 MG: 2.5 TABLET, FILM COATED ORAL at 21:43

## 2022-01-01 RX ADMIN — OLOPATADINE HYDROCHLORIDE 1 DROP: 1 SOLUTION OPHTHALMIC at 22:20

## 2022-01-01 RX ADMIN — LIDOCAINE HYDROCHLORIDE 15 MG: 10 INJECTION, SOLUTION EPIDURAL; INFILTRATION; INTRACAUDAL; PERINEURAL at 12:37

## 2022-01-01 RX ADMIN — OLOPATADINE HYDROCHLORIDE 1 DROP: 1 SOLUTION OPHTHALMIC at 09:01

## 2022-01-01 RX ADMIN — AMPICILLIN SODIUM AND SULBACTAM SODIUM 3 G: 2; 1 INJECTION, POWDER, FOR SOLUTION INTRAMUSCULAR; INTRAVENOUS at 00:28

## 2022-01-01 RX ADMIN — POLYETHYLENE GLYCOL 3350 17 G: 17 POWDER, FOR SOLUTION ORAL at 08:51

## 2022-01-01 RX ADMIN — ALBUTEROL SULFATE 2.5 MG: 2.5 SOLUTION RESPIRATORY (INHALATION) at 08:57

## 2022-01-01 RX ADMIN — GLYCOPYRROLATE 0.2 MG: 0.2 INJECTION, SOLUTION INTRAMUSCULAR; INTRAVENOUS at 12:38

## 2022-01-01 RX ADMIN — AMOXICILLIN AND CLAVULANATE POTASSIUM 1 TABLET: 875; 125 TABLET, FILM COATED ORAL at 09:19

## 2022-01-01 RX ADMIN — AMPICILLIN SODIUM AND SULBACTAM SODIUM 3 G: 2; 1 INJECTION, POWDER, FOR SOLUTION INTRAMUSCULAR; INTRAVENOUS at 12:28

## 2022-01-01 RX ADMIN — ONDANSETRON 4 MG: 2 INJECTION INTRAMUSCULAR; INTRAVENOUS at 10:39

## 2022-01-01 RX ADMIN — OLANZAPINE 2.5 MG: 2.5 TABLET, FILM COATED ORAL at 21:09

## 2022-01-01 RX ADMIN — AMPICILLIN SODIUM AND SULBACTAM SODIUM 3 G: 2; 1 INJECTION, POWDER, FOR SOLUTION INTRAMUSCULAR; INTRAVENOUS at 23:58

## 2022-01-01 RX ADMIN — ALBUTEROL SULFATE 2.5 MG: 2.5 SOLUTION RESPIRATORY (INHALATION) at 20:38

## 2022-01-01 RX ADMIN — PANTOPRAZOLE SODIUM 40 MG: 40 INJECTION, POWDER, FOR SOLUTION INTRAVENOUS at 09:01

## 2022-01-01 RX ADMIN — PANTOPRAZOLE SODIUM 20 MG: 20 TABLET, DELAYED RELEASE ORAL at 00:09

## 2022-01-01 RX ADMIN — POLYETHYLENE GLYCOL 3350 17 G: 17 POWDER, FOR SOLUTION ORAL at 13:59

## 2022-01-01 RX ADMIN — SENNOSIDES AND DOCUSATE SODIUM 2 TABLET: 50; 8.6 TABLET ORAL at 08:16

## 2022-01-01 RX ADMIN — FLUTICASONE PROPIONATE 2 SPRAY: 50 SPRAY, METERED NASAL at 10:45

## 2022-01-01 RX ADMIN — AMOXICILLIN AND CLAVULANATE POTASSIUM 1 TABLET: 875; 125 TABLET, FILM COATED ORAL at 10:10

## 2022-01-01 RX ADMIN — PANTOPRAZOLE SODIUM 40 MG: 40 INJECTION, POWDER, FOR SOLUTION INTRAVENOUS at 09:09

## 2022-01-01 RX ADMIN — TIZANIDINE 4 MG: 4 TABLET ORAL at 09:49

## 2022-01-01 RX ADMIN — PANTOPRAZOLE SODIUM 40 MG: 40 TABLET, DELAYED RELEASE ORAL at 17:50

## 2022-01-01 RX ADMIN — AMPICILLIN SODIUM AND SULBACTAM SODIUM 3 G: 2; 1 INJECTION, POWDER, FOR SOLUTION INTRAMUSCULAR; INTRAVENOUS at 17:42

## 2022-01-01 RX ADMIN — PANTOPRAZOLE SODIUM 40 MG: 40 INJECTION, POWDER, FOR SOLUTION INTRAVENOUS at 20:49

## 2022-01-01 RX ADMIN — ONDANSETRON 4 MG: 2 INJECTION INTRAMUSCULAR; INTRAVENOUS at 06:01

## 2022-01-01 RX ADMIN — MONTELUKAST 10 MG: 10 TABLET, FILM COATED ORAL at 22:04

## 2022-01-01 RX ADMIN — CETIRIZINE HYDROCHLORIDE 10 MG: 10 TABLET, FILM COATED ORAL at 11:45

## 2022-01-01 RX ADMIN — DICLOFENAC SODIUM 1 G: 10 GEL TOPICAL at 21:48

## 2022-01-01 RX ADMIN — TIZANIDINE 4 MG: 4 TABLET ORAL at 21:37

## 2022-01-01 RX ADMIN — SENNOSIDES AND DOCUSATE SODIUM 2 TABLET: 50; 8.6 TABLET ORAL at 18:42

## 2022-01-01 RX ADMIN — TIZANIDINE 4 MG: 4 TABLET ORAL at 10:10

## 2022-01-01 RX ADMIN — TIZANIDINE 4 MG: 4 TABLET ORAL at 08:58

## 2022-01-01 RX ADMIN — STANDARDIZED SENNA CONCENTRATE 2 TABLET: 8.6 TABLET ORAL at 21:34

## 2022-01-01 RX ADMIN — PANTOPRAZOLE SODIUM 40 MG: 40 TABLET, DELAYED RELEASE ORAL at 09:17

## 2022-01-01 RX ADMIN — STANDARDIZED SENNA CONCENTRATE 2 TABLET: 8.6 TABLET ORAL at 21:14

## 2022-01-01 RX ADMIN — AMPICILLIN SODIUM AND SULBACTAM SODIUM 3 G: 2; 1 INJECTION, POWDER, FOR SOLUTION INTRAMUSCULAR; INTRAVENOUS at 22:28

## 2022-01-01 RX ADMIN — TIZANIDINE 4 MG: 4 TABLET ORAL at 19:42

## 2022-01-01 RX ADMIN — STANDARDIZED SENNA CONCENTRATE 2 TABLET: 8.6 TABLET ORAL at 21:17

## 2022-01-01 RX ADMIN — ONDANSETRON 4 MG: 2 INJECTION INTRAMUSCULAR; INTRAVENOUS at 03:49

## 2022-01-01 RX ADMIN — OLOPATADINE HYDROCHLORIDE 1 DROP: 1 SOLUTION/ DROPS OPHTHALMIC at 09:09

## 2022-01-01 RX ADMIN — MAGNESIUM CITRATE 286 ML: 1.75 LIQUID ORAL at 22:39

## 2022-01-01 RX ADMIN — PANTOPRAZOLE SODIUM 40 MG: 40 INJECTION, POWDER, FOR SOLUTION INTRAVENOUS at 08:16

## 2022-01-01 RX ADMIN — ALBUTEROL SULFATE 2.5 MG: 2.5 SOLUTION RESPIRATORY (INHALATION) at 20:19

## 2022-01-01 RX ADMIN — DICLOFENAC SODIUM 1 G: 10 GEL TOPICAL at 11:35

## 2022-01-01 RX ADMIN — OLOPATADINE HYDROCHLORIDE 1 DROP: 1 SOLUTION OPHTHALMIC at 10:03

## 2022-01-01 RX ADMIN — LORAZEPAM 0.5 MG: 2 INJECTION INTRAMUSCULAR at 12:31

## 2022-01-01 RX ADMIN — DICLOFENAC SODIUM 1 G: 10 GEL TOPICAL at 08:51

## 2022-01-01 RX ADMIN — TIZANIDINE 4 MG: 4 TABLET ORAL at 15:48

## 2022-01-01 RX ADMIN — DICLOFENAC SODIUM 1 G: 10 GEL TOPICAL at 20:12

## 2022-01-01 RX ADMIN — SODIUM CHLORIDE: 9 INJECTION, SOLUTION INTRAVENOUS at 00:09

## 2022-01-01 RX ADMIN — DICLOFENAC SODIUM 1 G: 10 GEL TOPICAL at 10:24

## 2022-01-01 RX ADMIN — TIZANIDINE 4 MG: 4 TABLET ORAL at 21:41

## 2022-01-01 RX ADMIN — AMPICILLIN SODIUM AND SULBACTAM SODIUM 3 G: 2; 1 INJECTION, POWDER, FOR SOLUTION INTRAMUSCULAR; INTRAVENOUS at 06:10

## 2022-01-01 RX ADMIN — POTASSIUM CHLORIDE, DEXTROSE MONOHYDRATE AND SODIUM CHLORIDE: 150; 5; 450 INJECTION, SOLUTION INTRAVENOUS at 04:46

## 2022-01-01 RX ADMIN — PROCHLORPERAZINE EDISYLATE 10 MG: 5 INJECTION INTRAMUSCULAR; INTRAVENOUS at 09:26

## 2022-01-01 RX ADMIN — SODIUM CHLORIDE 1000 ML: 9 INJECTION, SOLUTION INTRAVENOUS at 00:10

## 2022-01-01 RX ADMIN — ACETAMINOPHEN 650 MG: 650 SUPPOSITORY RECTAL at 20:57

## 2022-01-01 RX ADMIN — Medication 1 MG: at 21:43

## 2022-01-01 RX ADMIN — OLANZAPINE 2.5 MG: 2.5 TABLET, FILM COATED ORAL at 21:32

## 2022-01-01 RX ADMIN — OLOPATADINE HYDROCHLORIDE 1 DROP: 1 SOLUTION OPHTHALMIC at 11:35

## 2022-01-01 RX ADMIN — PANTOPRAZOLE SODIUM 40 MG: 40 INJECTION, POWDER, FOR SOLUTION INTRAVENOUS at 11:45

## 2022-01-01 RX ADMIN — Medication 1000 UNITS: at 08:50

## 2022-01-01 RX ADMIN — PANTOPRAZOLE SODIUM 40 MG: 40 TABLET, DELAYED RELEASE ORAL at 08:50

## 2022-01-01 RX ADMIN — FAMOTIDINE 20 MG: 10 INJECTION, SOLUTION INTRAVENOUS at 01:20

## 2022-01-01 RX ADMIN — SODIUM CHLORIDE 1000 ML: 9 INJECTION, SOLUTION INTRAVENOUS at 01:04

## 2022-01-01 RX ADMIN — DICLOFENAC SODIUM 1 G: 10 GEL TOPICAL at 21:35

## 2022-01-01 RX ADMIN — TIZANIDINE 4 MG: 4 TABLET ORAL at 15:51

## 2022-01-01 RX ADMIN — ALBUTEROL SULFATE 2.5 MG: 2.5 SOLUTION RESPIRATORY (INHALATION) at 19:29

## 2022-01-01 RX ADMIN — SUCRALFATE 1 G: 1 TABLET ORAL at 22:21

## 2022-01-01 RX ADMIN — SODIUM CHLORIDE 1000 ML: 9 INJECTION, SOLUTION INTRAVENOUS at 02:44

## 2022-01-01 RX ADMIN — PANTOPRAZOLE SODIUM 40 MG: 40 INJECTION, POWDER, FOR SOLUTION INTRAVENOUS at 21:16

## 2022-01-01 RX ADMIN — DICLOFENAC SODIUM 1 G: 10 GEL TOPICAL at 22:18

## 2022-01-01 RX ADMIN — OLANZAPINE 2.5 MG: 2.5 TABLET, FILM COATED ORAL at 22:21

## 2022-01-01 RX ADMIN — ACETAMINOPHEN 650 MG: 650 SUPPOSITORY RECTAL at 13:59

## 2022-01-01 RX ADMIN — ACETAMINOPHEN 650 MG: 325 TABLET, FILM COATED ORAL at 21:17

## 2022-01-01 RX ADMIN — ONDANSETRON HYDROCHLORIDE 4 MG: 2 INJECTION, SOLUTION INTRAVENOUS at 12:43

## 2022-01-01 RX ADMIN — ALBUTEROL SULFATE 2.5 MG: 2.5 SOLUTION RESPIRATORY (INHALATION) at 20:00

## 2022-01-01 RX ADMIN — ACETAMINOPHEN 650 MG: 325 TABLET, FILM COATED ORAL at 11:18

## 2022-01-01 RX ADMIN — SCOPALAMINE 1 PATCH: 1 PATCH, EXTENDED RELEASE TRANSDERMAL at 01:31

## 2022-01-01 RX ADMIN — SODIUM CHLORIDE, POTASSIUM CHLORIDE, SODIUM LACTATE AND CALCIUM CHLORIDE: 600; 310; 30; 20 INJECTION, SOLUTION INTRAVENOUS at 05:05

## 2022-01-01 RX ADMIN — ALBUTEROL SULFATE 2.5 MG: 2.5 SOLUTION RESPIRATORY (INHALATION) at 19:32

## 2022-01-01 RX ADMIN — ONDANSETRON 4 MG: 4 TABLET, ORALLY DISINTEGRATING ORAL at 20:09

## 2022-01-01 RX ADMIN — BISACODYL 10 MG: 10 SUPPOSITORY RECTAL at 16:56

## 2022-01-01 RX ADMIN — ALBUTEROL SULFATE 2.5 MG: 2.5 SOLUTION RESPIRATORY (INHALATION) at 20:14

## 2022-01-01 RX ADMIN — TIZANIDINE 4 MG: 4 TABLET ORAL at 15:29

## 2022-01-01 RX ADMIN — STANDARDIZED SENNA CONCENTRATE 2 TABLET: 8.6 TABLET ORAL at 09:17

## 2022-01-01 RX ADMIN — PANTOPRAZOLE SODIUM 40 MG: 40 INJECTION, POWDER, FOR SOLUTION INTRAVENOUS at 10:10

## 2022-01-01 ASSESSMENT — ACTIVITIES OF DAILY LIVING (ADL)
ADLS_ACUITY_SCORE: 63
ADLS_ACUITY_SCORE: 59
ADLS_ACUITY_SCORE: 73
ADLS_ACUITY_SCORE: 77
ADLS_ACUITY_SCORE: 59
ADLS_ACUITY_SCORE: 69
ADLS_ACUITY_SCORE: 73
ADLS_ACUITY_SCORE: 77
ADLS_ACUITY_SCORE: 73
ADLS_ACUITY_SCORE: 73
ADLS_ACUITY_SCORE: 69
ADLS_ACUITY_SCORE: 73
ADLS_ACUITY_SCORE: 59
ADLS_ACUITY_SCORE: 73
ADLS_ACUITY_SCORE: 71
ADLS_ACUITY_SCORE: 73
ADLS_ACUITY_SCORE: 69
DRESSING/BATHING_DIFFICULTY: YES
DEPENDENT_IADLS:: CLEANING;COOKING;LAUNDRY;SHOPPING;MEAL PREPARATION;MEDICATION MANAGEMENT;MONEY MANAGEMENT;TRANSPORTATION
ADLS_ACUITY_SCORE: 59
ADLS_ACUITY_SCORE: 77
ADLS_ACUITY_SCORE: 69
ADLS_ACUITY_SCORE: 77
ADLS_ACUITY_SCORE: 77
ADLS_ACUITY_SCORE: 73
ADLS_ACUITY_SCORE: 73
ADLS_ACUITY_SCORE: 75
ADLS_ACUITY_SCORE: 59
ADLS_ACUITY_SCORE: 77
ADLS_ACUITY_SCORE: 77
WALKING_OR_CLIMBING_STAIRS_DIFFICULTY: YES
ADLS_ACUITY_SCORE: 77
ADLS_ACUITY_SCORE: 73
WEAR_GLASSES_OR_BLIND: NO
ADLS_ACUITY_SCORE: 77
ADLS_ACUITY_SCORE: 73
ADLS_ACUITY_SCORE: 73
ADLS_ACUITY_SCORE: 69
ADLS_ACUITY_SCORE: 73
ADLS_ACUITY_SCORE: 73
CONCENTRATING,_REMEMBERING_OR_MAKING_DECISIONS_DIFFICULTY: OTHER (SEE COMMENTS)
ADLS_ACUITY_SCORE: 69
ADLS_ACUITY_SCORE: 73
ADLS_ACUITY_SCORE: 73
ADLS_ACUITY_SCORE: 63
ADLS_ACUITY_SCORE: 71
ADLS_ACUITY_SCORE: 59
ADLS_ACUITY_SCORE: 73
ADLS_ACUITY_SCORE: 77
ADLS_ACUITY_SCORE: 73
ADLS_ACUITY_SCORE: 55
ADLS_ACUITY_SCORE: 73
DOING_ERRANDS_INDEPENDENTLY_DIFFICULTY: YES
ADLS_ACUITY_SCORE: 73
ADLS_ACUITY_SCORE: 69
ADLS_ACUITY_SCORE: 35
ADLS_ACUITY_SCORE: 73
ADLS_ACUITY_SCORE: 59
TRANSFERRING: 2-->COMPLETELY DEPENDENT
ADLS_ACUITY_SCORE: 73
ADLS_ACUITY_SCORE: 59
ADLS_ACUITY_SCORE: 77
ADLS_ACUITY_SCORE: 77
ADLS_ACUITY_SCORE: 47
ADLS_ACUITY_SCORE: 69
ADLS_ACUITY_SCORE: 73
ADLS_ACUITY_SCORE: 69
ADLS_ACUITY_SCORE: 77
ADLS_ACUITY_SCORE: 77
ADLS_ACUITY_SCORE: 73
DRESS: 2-->COMPLETELY DEPENDENT (NOT DEVELOPMENTALLY APPROPRIATE)
ADLS_ACUITY_SCORE: 73
ADLS_ACUITY_SCORE: 73
ADLS_ACUITY_SCORE: 77
ADLS_ACUITY_SCORE: 73
TOILETING_ISSUES: YES
ADLS_ACUITY_SCORE: 73
ADLS_ACUITY_SCORE: 63
ADLS_ACUITY_SCORE: 63
EQUIPMENT_CURRENTLY_USED_AT_HOME: WHEELCHAIR, MANUAL
ADLS_ACUITY_SCORE: 63
ADLS_ACUITY_SCORE: 63
ADLS_ACUITY_SCORE: 73
ADLS_ACUITY_SCORE: 73
ADLS_ACUITY_SCORE: 71
CHANGE_IN_FUNCTIONAL_STATUS_SINCE_ONSET_OF_CURRENT_ILLNESS/INJURY: NO
ADLS_ACUITY_SCORE: 73
ADLS_ACUITY_SCORE: 35
ADLS_ACUITY_SCORE: 73
ADLS_ACUITY_SCORE: 73
ADLS_ACUITY_SCORE: 59
ADLS_ACUITY_SCORE: 73
ADLS_ACUITY_SCORE: 73
ADLS_ACUITY_SCORE: 59
ADLS_ACUITY_SCORE: 69
ADLS_ACUITY_SCORE: 73
ADLS_ACUITY_SCORE: 69
ADLS_ACUITY_SCORE: 59
ADLS_ACUITY_SCORE: 63
ADLS_ACUITY_SCORE: 71
ADLS_ACUITY_SCORE: 73
ADLS_ACUITY_SCORE: 63
ADLS_ACUITY_SCORE: 73
ADLS_ACUITY_SCORE: 69
FALL_HISTORY_WITHIN_LAST_SIX_MONTHS: NO
DRESS: 2-->COMPLETELY DEPENDENT
ADLS_ACUITY_SCORE: 73
ADLS_ACUITY_SCORE: 73
ADLS_ACUITY_SCORE: 63
TOILETING: 2-->COMPLETELY DEPENDENT (NOT DEVELOPMENTALLY APPROPRIATE)
ADLS_ACUITY_SCORE: 73
ADLS_ACUITY_SCORE: 69
ADLS_ACUITY_SCORE: 77
ADLS_ACUITY_SCORE: 59
ADLS_ACUITY_SCORE: 73
ADLS_ACUITY_SCORE: 77
ADLS_ACUITY_SCORE: 59
WALKING_OR_CLIMBING_STAIRS: TRANSFERRING DIFFICULTY, DEPENDENT
ADLS_ACUITY_SCORE: 73
ADLS_ACUITY_SCORE: 59
ADLS_ACUITY_SCORE: 69
ADLS_ACUITY_SCORE: 75
ADLS_ACUITY_SCORE: 73
DRESSING/BATHING: BATHING DIFFICULTY, DEPENDENT;DRESSING DIFFICULTY, DEPENDENT
ADLS_ACUITY_SCORE: 73
ADLS_ACUITY_SCORE: 73
ADLS_ACUITY_SCORE: 77
ADLS_ACUITY_SCORE: 73
ADLS_ACUITY_SCORE: 77
ADLS_ACUITY_SCORE: 71
ADLS_ACUITY_SCORE: 69
ADLS_ACUITY_SCORE: 59
EATING/SWALLOWING: OTHER (SEE COMMENTS)
ADLS_ACUITY_SCORE: 59
ADLS_ACUITY_SCORE: 59
ADLS_ACUITY_SCORE: 47
ADLS_ACUITY_SCORE: 73
ADLS_ACUITY_SCORE: 77
ADLS_ACUITY_SCORE: 75
ADLS_ACUITY_SCORE: 75
ADLS_ACUITY_SCORE: 77
ADLS_ACUITY_SCORE: 77
ADLS_ACUITY_SCORE: 35
ADLS_ACUITY_SCORE: 73
ADLS_ACUITY_SCORE: 35
ADLS_ACUITY_SCORE: 73
ADLS_ACUITY_SCORE: 63
ADLS_ACUITY_SCORE: 73
ADLS_ACUITY_SCORE: 73
ADLS_ACUITY_SCORE: 63
ADLS_ACUITY_SCORE: 73
ADLS_ACUITY_SCORE: 73
ADLS_ACUITY_SCORE: 71
ADLS_ACUITY_SCORE: 73
ADLS_ACUITY_SCORE: 77
DEPENDENT_IADLS:: CLEANING;COOKING;LAUNDRY;SHOPPING;MEAL PREPARATION;MEDICATION MANAGEMENT;MONEY MANAGEMENT;TRANSPORTATION
DIFFICULTY_EATING/SWALLOWING: YES
ADLS_ACUITY_SCORE: 73
ADLS_ACUITY_SCORE: 73
ADLS_ACUITY_SCORE: 77
TRANSFERRING: 2-->COMPLETELY DEPENDENT (NOT DEVELOPMENTALLY APPROPRIATE)
ADLS_ACUITY_SCORE: 77
ADLS_ACUITY_SCORE: 73
ADLS_ACUITY_SCORE: 73
TOILETING: 2-->COMPLETELY DEPENDENT
ADLS_ACUITY_SCORE: 69
ADLS_ACUITY_SCORE: 73
ADLS_ACUITY_SCORE: 63
ADLS_ACUITY_SCORE: 77
ADLS_ACUITY_SCORE: 35
ADLS_ACUITY_SCORE: 77
BATHING: 2-->COMPLETELY DEPENDENT (NOT DEVELOPMENTALLY APPROPRIATE)
ADLS_ACUITY_SCORE: 69
ADLS_ACUITY_SCORE: 77
ADLS_ACUITY_SCORE: 59
ADLS_ACUITY_SCORE: 73
TOILETING_ASSISTANCE: TOILETING DIFFICULTY, DEPENDENT
ADLS_ACUITY_SCORE: 73
ADLS_ACUITY_SCORE: 77
ADLS_ACUITY_SCORE: 69
ADLS_ACUITY_SCORE: 73
ADLS_ACUITY_SCORE: 73

## 2022-01-01 ASSESSMENT — COLUMBIA-SUICIDE SEVERITY RATING SCALE - C-SSRS
IS THE PATIENT NOT ABLE TO COMPLETE C-SSRS: UNABLE TO VERBALIZE
IS THE PATIENT NOT ABLE TO COMPLETE C-SSRS: UNABLE TO VERBALIZE

## 2022-01-01 ASSESSMENT — ENCOUNTER SYMPTOMS
VOMITING: 1
NAUSEA: 1
VOMITING: 1

## 2022-01-01 ASSESSMENT — MIFFLIN-ST. JEOR
SCORE: 1140.17
SCORE: 1145.5

## 2022-01-09 ENCOUNTER — HOSPITAL ENCOUNTER (EMERGENCY)
Facility: CLINIC | Age: 57
Discharge: HOME | End: 2022-01-09
Attending: EMERGENCY MEDICINE | Admitting: EMERGENCY MEDICINE
Payer: MEDICAID

## 2022-01-09 ENCOUNTER — APPOINTMENT (OUTPATIENT)
Dept: GENERAL RADIOLOGY | Facility: CLINIC | Age: 57
End: 2022-01-09
Attending: EMERGENCY MEDICINE
Payer: MEDICAID

## 2022-01-09 VITALS
DIASTOLIC BLOOD PRESSURE: 79 MMHG | OXYGEN SATURATION: 97 % | RESPIRATION RATE: 20 BRPM | TEMPERATURE: 100.4 F | HEART RATE: 76 BPM | SYSTOLIC BLOOD PRESSURE: 110 MMHG

## 2022-01-09 DIAGNOSIS — R11.10 NON-INTRACTABLE VOMITING, PRESENCE OF NAUSEA NOT SPECIFIED, UNSPECIFIED VOMITING TYPE: ICD-10-CM

## 2022-01-09 DIAGNOSIS — U07.1 PNEUMONIA DUE TO 2019 NOVEL CORONAVIRUS: ICD-10-CM

## 2022-01-09 DIAGNOSIS — J12.82 PNEUMONIA DUE TO 2019 NOVEL CORONAVIRUS: ICD-10-CM

## 2022-01-09 PROBLEM — K21.9 GASTROESOPHAGEAL REFLUX DISEASE: Status: ACTIVE | Noted: 2022-01-09

## 2022-01-09 PROBLEM — R13.12 OROPHARYNGEAL DYSPHAGIA: Status: ACTIVE | Noted: 2017-11-03

## 2022-01-09 PROBLEM — G80.8 QUADRIPLEGIC CEREBRAL PALSY (H): Status: ACTIVE | Noted: 2022-01-09

## 2022-01-09 PROBLEM — K44.9 HIATAL HERNIA: Status: ACTIVE | Noted: 2022-01-09

## 2022-01-09 PROBLEM — R56.9 SEIZURE (H): Status: ACTIVE | Noted: 2022-01-09

## 2022-01-09 PROBLEM — F71 MODERATE INTELLECTUAL DISABILITY: Status: ACTIVE | Noted: 2022-01-09

## 2022-01-09 PROBLEM — J69.0 RECURRENT ASPIRATION PNEUMONIA (H): Status: ACTIVE | Noted: 2018-12-30

## 2022-01-09 LAB
FLUAV RNA SPEC QL NAA+PROBE: NEGATIVE
FLUBV RNA RESP QL NAA+PROBE: NEGATIVE
SARS-COV-2 RNA RESP QL NAA+PROBE: POSITIVE

## 2022-01-09 PROCEDURE — 87636 SARSCOV2 & INF A&B AMP PRB: CPT | Performed by: EMERGENCY MEDICINE

## 2022-01-09 PROCEDURE — 71045 X-RAY EXAM CHEST 1 VIEW: CPT

## 2022-01-09 PROCEDURE — 74019 RADEX ABDOMEN 2 VIEWS: CPT

## 2022-01-09 PROCEDURE — 250N000013 HC RX MED GY IP 250 OP 250 PS 637: Performed by: EMERGENCY MEDICINE

## 2022-01-09 PROCEDURE — 250N000011 HC RX IP 250 OP 636: Performed by: EMERGENCY MEDICINE

## 2022-01-09 PROCEDURE — 99285 EMERGENCY DEPT VISIT HI MDM: CPT | Mod: 25

## 2022-01-09 PROCEDURE — C9803 HOPD COVID-19 SPEC COLLECT: HCPCS

## 2022-01-09 RX ORDER — ONDANSETRON 4 MG/1
4 TABLET, ORALLY DISINTEGRATING ORAL EVERY 8 HOURS PRN
Qty: 15 TABLET | Refills: 0 | Status: SHIPPED | OUTPATIENT
Start: 2022-01-09 | End: 2022-01-10

## 2022-01-09 RX ORDER — ONDANSETRON 4 MG/1
4 TABLET, ORALLY DISINTEGRATING ORAL ONCE
Status: COMPLETED | OUTPATIENT
Start: 2022-01-09 | End: 2022-01-09

## 2022-01-09 RX ORDER — ACETAMINOPHEN 500 MG
1000 TABLET ORAL ONCE
Status: COMPLETED | OUTPATIENT
Start: 2022-01-09 | End: 2022-01-09

## 2022-01-09 RX ADMIN — ONDANSETRON 4 MG: 4 TABLET, ORALLY DISINTEGRATING ORAL at 20:09

## 2022-01-09 RX ADMIN — ACETAMINOPHEN 1000 MG: 500 TABLET, FILM COATED ORAL at 20:10

## 2022-01-10 ENCOUNTER — PATIENT OUTREACH (OUTPATIENT)
Dept: CARE COORDINATION | Facility: CLINIC | Age: 57
End: 2022-01-10

## 2022-01-10 ENCOUNTER — APPOINTMENT (OUTPATIENT)
Dept: GENERAL RADIOLOGY | Facility: CLINIC | Age: 57
End: 2022-01-10
Attending: EMERGENCY MEDICINE
Payer: MEDICAID

## 2022-01-10 ENCOUNTER — HOSPITAL ENCOUNTER (INPATIENT)
Facility: CLINIC | Age: 57
LOS: 3 days | Discharge: NURSING, REHAB OR LONG-TERM CARE | End: 2022-01-13
Attending: EMERGENCY MEDICINE | Admitting: HOSPITALIST
Payer: MEDICAID

## 2022-01-10 DIAGNOSIS — J69.0 ASPIRATION PNEUMONIA DUE TO VOMIT, UNSPECIFIED LATERALITY, UNSPECIFIED PART OF LUNG (H): Primary | ICD-10-CM

## 2022-01-10 DIAGNOSIS — U07.1 INFECTION DUE TO 2019 NOVEL CORONAVIRUS: ICD-10-CM

## 2022-01-10 DIAGNOSIS — J96.01 ACUTE RESPIRATORY FAILURE WITH HYPOXIA (H): ICD-10-CM

## 2022-01-10 DIAGNOSIS — G80.9 CEREBRAL PALSY, UNSPECIFIED TYPE (H): ICD-10-CM

## 2022-01-10 LAB
ABO/RH(D): NORMAL
ALBUMIN SERPL-MCNC: 2.9 G/DL (ref 3.4–5)
ALP SERPL-CCNC: 80 U/L (ref 40–150)
ALT SERPL W P-5'-P-CCNC: 28 U/L (ref 0–70)
ANION GAP SERPL CALCULATED.3IONS-SCNC: 2 MMOL/L (ref 3–14)
APTT PPP: 42 SECONDS (ref 22–38)
AST SERPL W P-5'-P-CCNC: 14 U/L (ref 0–45)
BASOPHILS # BLD AUTO: 0 10E3/UL (ref 0–0.2)
BASOPHILS NFR BLD AUTO: 0 %
BILIRUB SERPL-MCNC: 0.2 MG/DL (ref 0.2–1.3)
BUN SERPL-MCNC: 21 MG/DL (ref 7–30)
CALCIUM SERPL-MCNC: 7.9 MG/DL (ref 8.5–10.1)
CHLORIDE BLD-SCNC: 108 MMOL/L (ref 94–109)
CO2 SERPL-SCNC: 33 MMOL/L (ref 20–32)
CREAT SERPL-MCNC: 0.67 MG/DL (ref 0.66–1.25)
CREAT SERPL-MCNC: 0.68 MG/DL (ref 0.66–1.25)
CRP SERPL-MCNC: 7.4 MG/L (ref 0–8)
D DIMER PPP FEU-MCNC: 0.35 UG/ML FEU (ref 0–0.5)
EOSINOPHIL # BLD AUTO: 0.1 10E3/UL (ref 0–0.7)
EOSINOPHIL NFR BLD AUTO: 2 %
ERYTHROCYTE [DISTWIDTH] IN BLOOD BY AUTOMATED COUNT: 12 % (ref 10–15)
FIBRINOGEN PPP-MCNC: 407 MG/DL (ref 170–490)
GFR SERPL CREATININE-BSD FRML MDRD: >90 ML/MIN/1.73M2
GFR SERPL CREATININE-BSD FRML MDRD: >90 ML/MIN/1.73M2
GLUCOSE BLD-MCNC: 120 MG/DL (ref 70–99)
HCT VFR BLD AUTO: 38.7 % (ref 40–53)
HGB BLD-MCNC: 12.4 G/DL (ref 13.3–17.7)
IMM GRANULOCYTES # BLD: 0 10E3/UL
IMM GRANULOCYTES NFR BLD: 0 %
INR PPP: 1.01 (ref 0.85–1.15)
LACTATE SERPL-SCNC: 1.1 MMOL/L (ref 0.7–2)
LDH SERPL L TO P-CCNC: 143 U/L (ref 85–227)
LYMPHOCYTES # BLD AUTO: 1.1 10E3/UL (ref 0.8–5.3)
LYMPHOCYTES NFR BLD AUTO: 27 %
MCH RBC QN AUTO: 31.8 PG (ref 26.5–33)
MCHC RBC AUTO-ENTMCNC: 32 G/DL (ref 31.5–36.5)
MCV RBC AUTO: 99 FL (ref 78–100)
MONOCYTES # BLD AUTO: 0.2 10E3/UL (ref 0–1.3)
MONOCYTES NFR BLD AUTO: 6 %
NEUTROPHILS # BLD AUTO: 2.5 10E3/UL (ref 1.6–8.3)
NEUTROPHILS NFR BLD AUTO: 65 %
NRBC # BLD AUTO: 0 10E3/UL
NRBC BLD AUTO-RTO: 0 /100
PLATELET # BLD AUTO: 124 10E3/UL (ref 150–450)
POTASSIUM BLD-SCNC: 3.8 MMOL/L (ref 3.4–5.3)
PROCALCITONIN SERPL-MCNC: 0.07 NG/ML
PROT SERPL-MCNC: 6.3 G/DL (ref 6.8–8.8)
RBC # BLD AUTO: 3.9 10E6/UL (ref 4.4–5.9)
SODIUM SERPL-SCNC: 143 MMOL/L (ref 133–144)
SPECIMEN EXPIRATION DATE: NORMAL
TROPONIN I SERPL HS-MCNC: 5 NG/L
WBC # BLD AUTO: 3.9 10E3/UL (ref 4–11)

## 2022-01-10 PROCEDURE — 250N000011 HC RX IP 250 OP 636: Performed by: HOSPITALIST

## 2022-01-10 PROCEDURE — 86140 C-REACTIVE PROTEIN: CPT | Performed by: HOSPITALIST

## 2022-01-10 PROCEDURE — 99291 CRITICAL CARE FIRST HOUR: CPT | Mod: 25

## 2022-01-10 PROCEDURE — 96376 TX/PRO/DX INJ SAME DRUG ADON: CPT

## 2022-01-10 PROCEDURE — 99223 1ST HOSP IP/OBS HIGH 75: CPT | Mod: AI | Performed by: HOSPITALIST

## 2022-01-10 PROCEDURE — 120N000001 HC R&B MED SURG/OB

## 2022-01-10 PROCEDURE — 250N000011 HC RX IP 250 OP 636: Performed by: EMERGENCY MEDICINE

## 2022-01-10 PROCEDURE — 84145 PROCALCITONIN (PCT): CPT | Performed by: INTERNAL MEDICINE

## 2022-01-10 PROCEDURE — 87040 BLOOD CULTURE FOR BACTERIA: CPT | Performed by: EMERGENCY MEDICINE

## 2022-01-10 PROCEDURE — 85610 PROTHROMBIN TIME: CPT | Performed by: INTERNAL MEDICINE

## 2022-01-10 PROCEDURE — 250N000011 HC RX IP 250 OP 636: Performed by: INTERNAL MEDICINE

## 2022-01-10 PROCEDURE — 84484 ASSAY OF TROPONIN QUANT: CPT | Performed by: INTERNAL MEDICINE

## 2022-01-10 PROCEDURE — 71045 X-RAY EXAM CHEST 1 VIEW: CPT

## 2022-01-10 PROCEDURE — 36415 COLL VENOUS BLD VENIPUNCTURE: CPT | Performed by: EMERGENCY MEDICINE

## 2022-01-10 PROCEDURE — 85025 COMPLETE CBC W/AUTO DIFF WBC: CPT | Performed by: EMERGENCY MEDICINE

## 2022-01-10 PROCEDURE — 258N000003 HC RX IP 258 OP 636: Performed by: HOSPITALIST

## 2022-01-10 PROCEDURE — 86901 BLOOD TYPING SEROLOGIC RH(D): CPT | Performed by: INTERNAL MEDICINE

## 2022-01-10 PROCEDURE — 96366 THER/PROPH/DIAG IV INF ADDON: CPT

## 2022-01-10 PROCEDURE — 96375 TX/PRO/DX INJ NEW DRUG ADDON: CPT

## 2022-01-10 PROCEDURE — 93005 ELECTROCARDIOGRAM TRACING: CPT

## 2022-01-10 PROCEDURE — 96367 TX/PROPH/DG ADDL SEQ IV INF: CPT

## 2022-01-10 PROCEDURE — 36415 COLL VENOUS BLD VENIPUNCTURE: CPT | Performed by: INTERNAL MEDICINE

## 2022-01-10 PROCEDURE — 85379 FIBRIN DEGRADATION QUANT: CPT | Performed by: INTERNAL MEDICINE

## 2022-01-10 PROCEDURE — 82565 ASSAY OF CREATININE: CPT | Performed by: INTERNAL MEDICINE

## 2022-01-10 PROCEDURE — 80053 COMPREHEN METABOLIC PANEL: CPT | Performed by: EMERGENCY MEDICINE

## 2022-01-10 PROCEDURE — 83605 ASSAY OF LACTIC ACID: CPT | Performed by: EMERGENCY MEDICINE

## 2022-01-10 PROCEDURE — 85384 FIBRINOGEN ACTIVITY: CPT | Performed by: INTERNAL MEDICINE

## 2022-01-10 PROCEDURE — 83615 LACTATE (LD) (LDH) ENZYME: CPT | Performed by: HOSPITALIST

## 2022-01-10 PROCEDURE — 96365 THER/PROPH/DIAG IV INF INIT: CPT | Mod: 59

## 2022-01-10 PROCEDURE — 85730 THROMBOPLASTIN TIME PARTIAL: CPT | Performed by: INTERNAL MEDICINE

## 2022-01-10 PROCEDURE — XW033E5 INTRODUCTION OF REMDESIVIR ANTI-INFECTIVE INTO PERIPHERAL VEIN, PERCUTANEOUS APPROACH, NEW TECHNOLOGY GROUP 5: ICD-10-PCS | Performed by: HOSPITALIST

## 2022-01-10 PROCEDURE — 250N000009 HC RX 250: Performed by: HOSPITALIST

## 2022-01-10 PROCEDURE — 96361 HYDRATE IV INFUSION ADD-ON: CPT

## 2022-01-10 RX ORDER — POLYETHYLENE GLYCOL 3350 17 G/17G
17 POWDER, FOR SOLUTION ORAL DAILY
Status: DISCONTINUED | OUTPATIENT
Start: 2022-01-10 | End: 2022-01-13 | Stop reason: HOSPADM

## 2022-01-10 RX ORDER — AMPICILLIN AND SULBACTAM 2; 1 G/1; G/1
3 INJECTION, POWDER, FOR SOLUTION INTRAMUSCULAR; INTRAVENOUS ONCE
Status: COMPLETED | OUTPATIENT
Start: 2022-01-10 | End: 2022-01-10

## 2022-01-10 RX ORDER — ONDANSETRON 2 MG/ML
4 INJECTION INTRAMUSCULAR; INTRAVENOUS EVERY 6 HOURS PRN
Status: DISCONTINUED | OUTPATIENT
Start: 2022-01-10 | End: 2022-01-13 | Stop reason: HOSPADM

## 2022-01-10 RX ORDER — ALBUTEROL SULFATE 90 UG/1
2 AEROSOL, METERED RESPIRATORY (INHALATION) EVERY 4 HOURS PRN
Status: DISCONTINUED | OUTPATIENT
Start: 2022-01-10 | End: 2022-01-13 | Stop reason: HOSPADM

## 2022-01-10 RX ORDER — OLOPATADINE HYDROCHLORIDE 1 MG/ML
1 SOLUTION/ DROPS OPHTHALMIC 2 TIMES DAILY
COMMUNITY

## 2022-01-10 RX ORDER — LORAZEPAM 0.5 MG/1
0.5 TABLET ORAL 2 TIMES DAILY PRN
Status: ON HOLD | COMMUNITY
End: 2022-01-01

## 2022-01-10 RX ORDER — BISACODYL 10 MG
10 SUPPOSITORY, RECTAL RECTAL DAILY PRN
Status: DISCONTINUED | OUTPATIENT
Start: 2022-01-10 | End: 2022-01-13 | Stop reason: HOSPADM

## 2022-01-10 RX ORDER — ACETAMINOPHEN 500 MG
1000 TABLET ORAL EVERY 6 HOURS PRN
Status: ON HOLD | COMMUNITY
End: 2022-01-13

## 2022-01-10 RX ORDER — OLANZAPINE 2.5 MG/1
2.5 TABLET, FILM COATED ORAL AT BEDTIME
COMMUNITY

## 2022-01-10 RX ORDER — ACETAMINOPHEN 325 MG/1
650 TABLET ORAL EVERY 6 HOURS PRN
Status: DISCONTINUED | OUTPATIENT
Start: 2022-01-10 | End: 2022-01-13 | Stop reason: HOSPADM

## 2022-01-10 RX ORDER — MONTELUKAST SODIUM 10 MG/1
10 TABLET ORAL AT BEDTIME
COMMUNITY

## 2022-01-10 RX ORDER — DEXAMETHASONE SODIUM PHOSPHATE 10 MG/ML
6 INJECTION, SOLUTION INTRAMUSCULAR; INTRAVENOUS ONCE
Status: COMPLETED | OUTPATIENT
Start: 2022-01-10 | End: 2022-01-10

## 2022-01-10 RX ORDER — LORAZEPAM 1 MG/1
1 TABLET ORAL 2 TIMES DAILY PRN
Status: ON HOLD | COMMUNITY
End: 2022-01-13

## 2022-01-10 RX ORDER — LIDOCAINE 40 MG/G
CREAM TOPICAL
Status: DISCONTINUED | OUTPATIENT
Start: 2022-01-10 | End: 2022-01-13 | Stop reason: HOSPADM

## 2022-01-10 RX ORDER — ACETAMINOPHEN 650 MG/1
650 SUPPOSITORY RECTAL EVERY 6 HOURS PRN
Status: DISCONTINUED | OUTPATIENT
Start: 2022-01-10 | End: 2022-01-13 | Stop reason: HOSPADM

## 2022-01-10 RX ORDER — AMPICILLIN AND SULBACTAM 2; 1 G/1; G/1
3 INJECTION, POWDER, FOR SOLUTION INTRAMUSCULAR; INTRAVENOUS EVERY 6 HOURS
Status: DISCONTINUED | OUTPATIENT
Start: 2022-01-10 | End: 2022-01-12

## 2022-01-10 RX ORDER — ACETAMINOPHEN 325 MG/1
650 TABLET ORAL EVERY 6 HOURS PRN
Status: ON HOLD | COMMUNITY
End: 2022-01-01

## 2022-01-10 RX ADMIN — AMPICILLIN SODIUM AND SULBACTAM SODIUM 3 G: 2; 1 INJECTION, POWDER, FOR SOLUTION INTRAMUSCULAR; INTRAVENOUS at 20:57

## 2022-01-10 RX ADMIN — AMPICILLIN SODIUM AND SULBACTAM SODIUM 3 G: 2; 1 INJECTION, POWDER, FOR SOLUTION INTRAMUSCULAR; INTRAVENOUS at 15:07

## 2022-01-10 RX ADMIN — DEXAMETHASONE SODIUM PHOSPHATE 6 MG: 10 INJECTION, SOLUTION INTRAMUSCULAR; INTRAVENOUS at 01:55

## 2022-01-10 RX ADMIN — SODIUM CHLORIDE 50 ML: 9 INJECTION, SOLUTION INTRAVENOUS at 04:13

## 2022-01-10 RX ADMIN — AMPICILLIN SODIUM AND SULBACTAM SODIUM 3 G: 2; 1 INJECTION, POWDER, FOR SOLUTION INTRAMUSCULAR; INTRAVENOUS at 08:07

## 2022-01-10 RX ADMIN — ONDANSETRON 4 MG: 2 INJECTION INTRAMUSCULAR; INTRAVENOUS at 20:50

## 2022-01-10 RX ADMIN — ENOXAPARIN SODIUM 40 MG: 40 INJECTION SUBCUTANEOUS at 05:30

## 2022-01-10 RX ADMIN — DEXAMETHASONE 6 MG: 2 TABLET ORAL at 15:07

## 2022-01-10 RX ADMIN — REMDESIVIR 200 MG: 100 INJECTION, POWDER, LYOPHILIZED, FOR SOLUTION INTRAVENOUS at 04:13

## 2022-01-10 RX ADMIN — AMPICILLIN SODIUM AND SULBACTAM SODIUM 3 G: 2; 1 INJECTION, POWDER, FOR SOLUTION INTRAMUSCULAR; INTRAVENOUS at 01:55

## 2022-01-10 ASSESSMENT — ACTIVITIES OF DAILY LIVING (ADL)
ADLS_ACUITY_SCORE: 12
ADLS_ACUITY_SCORE: 20
ADLS_ACUITY_SCORE: 12
ADLS_ACUITY_SCORE: 26
ADLS_ACUITY_SCORE: 26
ADLS_ACUITY_SCORE: 12
ADLS_ACUITY_SCORE: 26
ADLS_ACUITY_SCORE: 12
ADLS_ACUITY_SCORE: 26
ADLS_ACUITY_SCORE: 12

## 2022-01-10 ASSESSMENT — ENCOUNTER SYMPTOMS: SHORTNESS OF BREATH: 1

## 2022-01-10 NOTE — PLAN OF CARE
SLP: Orders received, chart reviewed, discussed with RN who approved evaluation and SLP entered room. Upon entry pt had what appeared to be brown vomit within his mask, around his mouth and over his bed. He was overtly coughing with wet congested breathing/cough quality with poor ability to manage secretions and/or vomit. He also had pulled an IV in his L arm and is bleeding. RN notified immediately re: all. Pt not appropriate for a swallow evaluation at this time and would recommend NPO status. Per paperwork sent with pt, baseline diet was puree solids, pudding thick liquid. If pt is positioned upright consider essential meds crushed with puree, verify each swallow.

## 2022-01-10 NOTE — ED NOTES
Bed: ED08  Expected date:   Expected time:   Means of arrival:   Comments:  EMS - 56 M CP, vomiting, COVID

## 2022-01-10 NOTE — ED NOTES
During rounds on patient, writer found patient lying in vomit and 2nd IV was taken out by patient.

## 2022-01-10 NOTE — ED NOTES
Took meds with thickedned liquid. Has communication board that was dropped off by group home staff

## 2022-01-10 NOTE — ED TRIAGE NOTES
Patient with history of CP, lives at group home. EMS called due to fever and vomiting. Concern for aspiration. EMS reports patient tested positive for COVID

## 2022-01-10 NOTE — PROGRESS NOTES
Clinic Care Coordination Contact    Care Coordination ED Discharge Follow up Note    Patient referred for Virtual Home Monitoring Program for COVID-19 following recent ED visit.    Criteria for Virtual Home Monitoring telephone outreach is not met after review of ED encounter/ED provider note because:    1) Patient's disposition has changed. He is being admitted to hospital per chart notes as of 1/10/22 AM.        Eliane Krishnamurthy RN  [facility]  - Clinic Care Coordinator

## 2022-01-10 NOTE — ED PROVIDER NOTES
History   Chief Complaint:  Shortness of Breath     The history is provided by the EMS personnel.      Benito Marx is a 56 year old male with history of cerebral palsy and aspiration pneumonia who presents with shortness of breath. Per chart review, he was seen here yesterday due to concerns for aspiration. He had episodes of vomiting at his group home, fevers throughout the day and EMS was called due to concern for aspiration. He was diagnosed with COVID-19 and discharged home. See results below.  Upon returning home, he was noted to have worsening hypoxia to the low 90s and upper 80s, prompting decision to call EMS and present to the ER.      Imaging Results:   XR Abdomen 2 Views  IMPRESSION: There is a large amount of air and stool throughout the colon. No distended air-filled loops of small bowel identified. No intraperitoneal free air identified. Status post cholecystectomy.     XR Chest 1 View  IMPRESSION: Minor nonspecific patchy groundglass opacities. No pneumothorax or pleural effusion. Heart size stable.     Imaging features can be seen with (COVID-19)  pneumonia, though are nonspecific and can occur with a variety of infectious and noninfectious processes.     Report per radiology    Laboratory Results:   Symptomatic Influenza A/B & SARS-CoV2 (COVID19) Virus PCR Multiplex: SARS CoV2 PCR positive        Review of Systems   Unable to perform ROS: Patient nonverbal   Respiratory: Positive for shortness of breath.    All other systems reviewed and are negative.    Allergies:  No known drug allergies     Medications:  Ativan  Remeron  Singulair  Mycostatin  Zyprexa  Milk of magnesia  Protonix  Albuterol inhaler  Zyrtec  Celexa  Kristalose  Senokot  Zanaflex     Past Medical History:     Adjustment disorder with depressed mood  Cerebral palsy  GERD  Aspiration pneumonia  Seizures  Mental retardation  Congenital hemiplegia  Nutritional Supplements  Seasonal Allergies     Past Surgical History:     Cholecystectomy     Social History:  Presents alone via EMS  Lives in group home    Physical Exam     Patient Vitals for the past 24 hrs:   BP Temp Temp src Pulse Resp SpO2   01/10/22 0130 101/62 -- -- 67 20 94 %   01/10/22 0120 94/67 -- -- 71 14 93 %   01/10/22 0115 99/75 98.6  F (37  C) Oral 73 14 95 %       Physical Exam  General:   Awake   Minimally verbal (hx of CP)  Eyes:   Conjunctiva without injection or scleral icterus  ENT:   Moist mucous membranes   Nares patent   Pinnae normal  Neck:   Full ROM   No stiffness appreciated  Resp:   Coarse breath sounds bilaterally   Unremarkable work of breathing  CV:    Normal rate, regular rhythm   S1 and S2 present   No murmur, gallop or rub  GI:   BS present   Abdomen soft without distention   Non-tender to light and deep palpation   No guarding or rebound tenderness  Skin:   Warm, dry, well perfused   No rashes or open wounds on exposed skin  MSK:   Moves all extremities   No focal deformities or swelling  Neuro:   Alert   Answers questions appropriately   Moves all extremities equally   Gait stable  Psych:   Normal affect, normal mood        Emergency Department Course     Imaging:  XR Chest Port 1 View   Final Result   IMPRESSION: Lungs appear largely clear. No focal consolidation reliably detected. No significant pleural fluid. No pneumothorax. Normal heart size and pulmonary vascularity. Mild to moderate convex right thoracic scoliosis.        Report per radiology    Laboratory:  Labs Ordered and Resulted from Time of ED Arrival to Time of ED Departure   COMPREHENSIVE METABOLIC PANEL - Abnormal       Result Value    Sodium 143      Potassium 3.8      Chloride 108      Carbon Dioxide (CO2) 33 (*)     Anion Gap 2 (*)     Urea Nitrogen 21      Creatinine 0.68      Calcium 7.9 (*)     Glucose 120 (*)     Alkaline Phosphatase 80      AST 14      ALT 28      Protein Total 6.3 (*)     Albumin 2.9 (*)     Bilirubin Total 0.2      GFR Estimate >90     CBC WITH PLATELETS  AND DIFFERENTIAL - Abnormal    WBC Count 3.9 (*)     RBC Count 3.90 (*)     Hemoglobin 12.4 (*)     Hematocrit 38.7 (*)     MCV 99      MCH 31.8      MCHC 32.0      RDW 12.0      Platelet Count 124 (*)     % Neutrophils 65      % Lymphocytes 27      % Monocytes 6      % Eosinophils 2      % Basophils 0      % Immature Granulocytes 0      NRBCs per 100 WBC 0      Absolute Neutrophils 2.5      Absolute Lymphocytes 1.1      Absolute Monocytes 0.2      Absolute Eosinophils 0.1      Absolute Basophils 0.0      Absolute Immature Granulocytes 0.0      Absolute NRBCs 0.0     LACTIC ACID WHOLE BLOOD - Normal    Lactic Acid 1.1     BLOOD CULTURE   BLOOD CULTURE   BLOOD CULTURE        Procedures    Emergency Department Course:  Reviewed:  I reviewed nursing notes, vitals, past medical history and Care Everywhere    Assessments:  0103 EMS arrives and reports. Red team activation upon arrival.   0105 I examained the patient.     Consults:  0200 : I spoke with Dr. Mccall of the Hospitalist service from [facility] regarding patient's presentation, findings, and plan of care.    Interventions:  Medications   sodium chloride (PF) 0.9% PF flush 3 mL (has no administration in time range)   sodium chloride (PF) 0.9% PF flush 3 mL (3 mLs Intracatheter Given 1/10/22 0156)   ampicillin-sulbactam (UNASYN) 3 g vial to attach to  mL bag (3 g Intravenous New Bag 1/10/22 0155)   dexamethasone PF (DECADRON) injection 6 mg (6 mg Intravenous Given 1/10/22 0155)       Disposition:  The patient was admitted to the hospital under the care of Dr. Mccall.     Impression & Plan     Medical Decision Making:  Benito Marx is a 56-year-old male with a complex PMH significant for cerebral palsy, quadriplegia, recurrent aspiration pneumonia, who presents to the ER for evaluation of hypoxia.  Patient seen and evaluated in this ED yesterday evening with a diagnosis of COVID-19, and subsequently discharged home.  He represents to the ER with  progressing hypoxia.  His symptoms today are likely multifactorial, largely stemming from COVID-19.  He does have a history of aspiration pneumonia, and reportedly had an episode of emesis yesterday.  His chest x-ray does not reveal clear pneumonia, though possible this may be evolving over the coming 12-24 hours.  He was provided a dose of Unasyn as well as dexamethasone given COVID hypoxia.  He was maintained on 2 L per nasal cannula.  Laboratory evaluation reveals mild leukopenia (likely secondary to viral suppression in the setting of COVID), but normal lactate. Hgb mildly low and Plt near prior values. Blood cultures x2 obtained and are presently pending.  Patient will require admission for further supportive treatment and care.     ADDENDUM: During ED course, patient was found to have bradycardia with HR's in the 40s.  EKG performed demonstrating sinus bradycardia without appreciable AV block.  Hemodynamics reveal stable blood pressure.  Patient has otherwise been sleeping during this time, which may account for his bradycardia.  Hospitalist service was updated (Dr. Scott), and will continue close monitoring.    Diagnosis:    ICD-10-CM    1. Infection due to 2019 novel coronavirus  U07.1    2. Acute respiratory failure with hypoxia (H)  J96.01    3. Meconium aspiration pneumonia, unspecified laterality, unspecified part of lung  P24.01    4. Cerebral palsy, unspecified type (H)  G80.9        Scribe Disclosure:  Emelina COLBY, am serving as a scribe at 1:05 AM on 1/10/2022 to document services personally performed by Otf Putnam MD based on my observations and the provider's statements to me.            Otf Putnam MD  01/10/22 1927       Otf Ptunam MD  01/10/22 1258

## 2022-01-10 NOTE — ED TRIAGE NOTES
Patient recently here for fever,  vomiting and positive COVID. Discharged on home monitoring. Staff concerned that patient was 90-91% on room air.

## 2022-01-10 NOTE — H&P
[facility]    History and Physical - Hospitalist Service       Date of Admission:  1/10/2022    Assessment & Plan      Benito Marx is a 56 year old male was brought to the ER by ambulance from care home for hypoxia.  Patient was seen in the ER earlier that is last night for possible aspiration as patient had vomiting associated fevers.  Patient lives in group home and has history of cerebral palsy with quadriplegia.  Patient has history of recurrent aspiration pneumonias and it looks like he is on a special diet.  During the process of work-up he was diagnosed with COVID and he was sent home on the COVID home monitoring kit.  The group home staff noted patient was hypoxic so he was sent to the ER.  In ER patient had fever with last visit but not this visit.  Patient does not appear to be toxic.  He was slightly hypoxic needing 2 L of oxygen supplement.  Chest x-ray was repeated unchanged.  Patient is getting admitted for respiratory failure secondary to COVID and also possible aspiration pneumonia.    # Confirmed COVID-19 infection    # Acute Hypoxic Respiratory Failure secondary to COVID-19 infection  # Viral Pneumonia secondary to COVID-19 infection     Symptom Onset unsure   Date of 1st Positive Test 01/09/2022   Vaccination Status Fully Vaccinated, not received booster       - COVID-19 special precautions, continuous pulse-ox  - Oxygen: continue current support with nasal cannula at 2 L/min; titrate to keep SpO2 between 90-96%  - Labs: standard [facility] admission labs ordered (CBC with diff, CMP, retic count, LDH, troponin, BNP, CK, INR/PT, PTT, D-dimer, fibrinogen, antithrombin, ferritin, CRP, IL-6)   - Imaging: no additional imaging needed at this time  - Breathing treatments: albuterol inhaler four times a day scheduled and PRN; avoid nebulizers in favor of MDIs   - IV fluids: not indicated at this time  - Antibiotics: indicated due to concern of aspiration pneumonia, Unasyn ordered    - COVID-Focused Medications: Dexamethasone 6 mg x 10 days or until hospital discharge, started on 01/10/2022 and Remdesivir x 5 days or until hospital discharge, started on 01/10/2022  - DVT Prophylaxis: at high risk of thrombotic complications due to COVID-19 (DDimer = N/A ).          - PROPHYLACTIC dosing: lovenox 40mg daily        - consider anticoag on discharge for 30 days & until return to normal   mobility  Active Problems:    Aspiration pneumonia (H)    Assessment: Has a history of recurrent aspiration pneumonias.    Plan: I kept him n.p.o., ordered speech therapy for swallow evaluation. Started him on Unasyn    Quadriplegic cerebral palsy (H)    Assessment: stable    Plan: continue home mes    Episodic mood disorder (H)    Assessment: stable    Plan: continue home meds    Seizure (H)    Assessment: doesn't appear to have any seizures recently    Plan: continue home med    Urinary incontinence    Assessment: chronic    Plan: continue home meds and pericare    Cerebral palsy, unspecified type (H)    Assessment: stable    Plan: continue home meds         Diet:   NPO, ordered speech therapy  DVT Prophylaxis: Enoxaparin (Lovenox) SQ  Luke Catheter: Not present  Central Lines: None  Code Status:   FULL CODE    Disposition Plan   Expected Discharge:more than or equal to 2 midnight stay   Anticipated discharge location:[facility]       The patient's care was discussed with the team.    Cande Mccall MD  [facility]  Securely message with the Vocera Web Console (learn more here)  Text page via [employer] Paging/Directory        ______________________________________________________________________    Chief Complaint   hypoxia    Unable to obtain a history from the patient due to non verbal secondary to cerebra Palsy    History of Present Illness   Benito Marx is a 56 year old male who was brought to the ER by ambulance from [facility] for hypoxia.  Patient was seen in the ER earlier that  is last night for possible aspiration as patient had vomiting associated fevers.  Patient lives in group home and has history of cerebral palsy with quadriplegia.  Patient has history of recurrent aspiration pneumonias and it looks like he is on a special diet.  During the process of work-up he was diagnosed with COVID and he was sent home on the COVID home monitoring kit.  The group home staff noted patient was hypoxic so he was sent to the ER.  In ER patient had fever with last visit but not this visit.  Patient does not appear to be toxic.  He was slightly hypoxic needing 2 L of oxygen supplement.  Chest x-ray was repeated unchanged.  Patient is getting admitted for respiratory failure secondary to COVID and also possible aspiration pneumonia.    Review of Systems    Review of systems not obtained due to patient factors - non verbal secondary to CP    Past Medical History    I have reviewed this patient's medical history and updated it with pertinent information if needed.   Past Medical History:   Diagnosis Date     Adjustment disorder with depressed mood      Cerebral palsy (H)      Constipated      GERD (gastroesophageal reflux disease)      History of aspiration pneumonia      Mental retardation, moderate (I.Q. 35-49)     prenatal hypoxia     Seizures (H)     LAST Sz 1994, No AEM as of 10.15.18       Past Surgical History   I have reviewed this patient's surgical history and updated it with pertinent information if needed.  Past Surgical History:   Procedure Laterality Date     CHOLECYSTECTOMY         Social History   I have reviewed this patient's social history and updated it with pertinent information if needed.  Social History     Tobacco Use     Smoking status: Never Smoker     Smokeless tobacco: Never Used   Substance Use Topics     Alcohol use: Never     Drug use: Never       Family History   I have reviewed this patient's family history and updated it with pertinent information if needed.  Family  History   Problem Relation Age of Onset     No Known Problems Mother      No Known Problems Father      No Known Problems Maternal Grandmother      No Known Problems Maternal Grandfather      No Known Problems Paternal Grandmother      No Known Problems Paternal Grandfather      No Known Problems Brother        Prior to Admission Medications   Prior to Admission Medications   Prescriptions Last Dose Informant Patient Reported? Taking?   KRISTALOSE 20 g packet   Yes No   Sig: Take 20 g by mouth daily Mix with applesauce once a day   UNABLE TO FIND   Yes No   Sig: MEDICATION NAME: Boost Breeze nutritional supplement 8 oz TID   acetaminophen (TYLENOL) 160 MG/5ML solution   Yes No   Sig: Take 325 mg by mouth daily   albuterol (PROVENTIL) (2.5 MG/3ML) 0.083% neb solution   Yes No   Sig: Take 2.5 mg by nebulization 2 times daily    cetirizine (ZYRTEC) 10 MG tablet   Yes No   Sig: Take 10 mg by mouth daily   cholecalciferol 25 MCG (1000 UT) TABS   Yes No   Sig: Take 1,000 Units by mouth daily   citalopram (CELEXA) 40 MG tablet   Yes No   Sig: Take 40 mg by mouth daily   docusate sodium (COLACE) 100 MG capsule   Yes No   Sig: Take 200 mg by mouth 2 times daily   fluticasone (FLONASE) 50 MCG/ACT nasal spray   Yes No   Sig: Spray 2 sprays into both nostrils daily   magnesium hydroxide (MILK OF MAGNESIA) 400 MG/5ML suspension   Yes No   Sig: Take 30 mLs by mouth daily   ondansetron (ZOFRAN ODT) 4 MG ODT tab   No No   Sig: Take 1 tablet (4 mg) by mouth every 8 hours as needed for nausea or vomiting   pantoprazole (PROTONIX) 20 MG EC tablet   Yes No   Sig: Take 20 mg by mouth 2 times daily   polyethylene glycol (MIRALAX) 17 GM/Dose powder   Yes No   Sig: Take 17 g by mouth daily   senna (SENOKOT) 8.6 MG tablet   Yes No   Sig: Take 17.2 mg by mouth 2 times daily   tiZANidine (ZANAFLEX) 4 MG tablet   Yes No   Sig: Take 4 mg by mouth 3 times daily      Facility-Administered Medications: None     Allergies   Allergies   Allergen  Reactions     Nutritional Supplements Nausea and Vomiting     increased vomiting when taking this.     Seasonal Allergies        Physical Exam   Vital Signs: Temp: 98.6  F (37  C) Temp src: Oral BP: 101/62 Pulse: 67   Resp: 20 SpO2: 94 % O2 Device: Nasal cannula    Weight: 0 lbs 0 oz    Constitutional: alert, mon verbal, doesn't appear in distress  Eyes: Lids and lashes normal, pupils equal, round and reactive to light, extra ocular muscles intact, sclera clear, conjunctiva normal  ENT: Normocephalic, without obvious abnormality, atraumatic, sinuses nontender on palpation, external ears without lesions, oral pharynx with moist mucous membranes, tonsils without erythema or exudates, gums normal and good dentition.  Respiratory: No increased work of breathing, good air exchange, clear to auscultation bilaterally, no crackles or wheezing  Cardiovascular: Normal apical impulse, regular rate and rhythm, normal S1 and S2, no S3 or S4, and no murmur noted  GI: No scars, normal bowel sounds, soft, non-distended, non-tender, no masses palpated, no hepatosplenomegally  Neuropsychiatric:he appears to be at his baseline, he is quadriplegic    Data   Data reviewed today: I reviewed all medications, new labs and imaging results over the last 24 hours.    Recent Labs   Lab 01/10/22  0117   WBC 3.9*   HGB 12.4*   MCV 99   *      POTASSIUM 3.8   CHLORIDE 108   CO2 33*   BUN 21   CR 0.68   ANIONGAP 2*   ASTRID 7.9*   *   ALBUMIN 2.9*   PROTTOTAL 6.3*   BILITOTAL 0.2   ALKPHOS 80   ALT 28   AST 14     3.9 (L)    \    12.4 (L)    /    124 (L)   N 65    L N/A    143    108    21 /   ------------------------------------ 120 (H)   ALT 28   AST 14   AP 80   ALB 2.9 (L)   Ca 7.9 (L)  3.8    33 (H)    0.68 \    % RETIC N/A    LDH N/A  Troponin N/A    BNP N/A    CK N/A  INR N/A   PTT N/A    D-dimer N/A    Fibrinogen N/A    Antithrombin N/A  Ferritin N/A  CRP N/A    IL-6 N/A  Recent Results (from the past 24 hour(s))   XR  Chest 1 View    Narrative    EXAM: XR CHEST 1 VIEW  LOCATION: [facility]  DATE/TIME: 1/9/2022 8:33 PM    INDICATION: Cough, COVID positive.   COMPARISON: 10/15/2018.      Impression    IMPRESSION: Minor nonspecific patchy groundglass opacities. No pneumothorax or pleural effusion. Heart size stable.    Imaging features can be seen with (COVID-19)  pneumonia, though are nonspecific and can occur with a variety of infectious and noninfectious processes.   XR Abdomen 2 Views    Narrative    EXAM: XR ABDOMEN 2VIEWS  LOCATION: [facility]  DATE/TIME: 1/9/2022 8:32 PM    INDICATION: vomiting, eval for SBO  COMPARISON: 10/15/2018       Impression    IMPRESSION: There is a large amount of air and stool throughout the colon. No distended air-filled loops of small bowel identified. No intraperitoneal free air identified. Status post cholecystectomy.   XR Chest Port 1 View    Narrative    EXAM: XR CHEST PORT 1 VIEW  LOCATION: [facility]  DATE/TIME: 1/10/2022 1:19 AM    INDICATION: Fever  COMPARISON: 01/09/2022.      Impression    IMPRESSION: Lungs appear largely clear. No focal consolidation reliably detected. No significant pleural fluid. No pneumothorax. Normal heart size and pulmonary vascularity. Mild to moderate convex right thoracic scoliosis.

## 2022-01-10 NOTE — PHARMACY-ADMISSION MEDICATION HISTORY
Admission medication history interview status for this patient is complete. See [facility] admission navigator for allergy information, prior to admission medications and immunization status.     Medication history interview done, indicate source(s): Caregiver  Medication history resources (including written lists, pill bottles, clinic record): Epic list, fill history,   Ph[facility]    Changes made to PTA medication list:  Added: lorazepam, acetaminophen 500, montelukast, olopatadine, olanzapine, diclofenac  Changed: none  Reported as Not Taking: Docusate  Removed: docusate, ondansetron, Miralax    Actions taken by pharmacist (provider contacted, etc):None     Additional medication history information:None    Medication reconciliation/reorder completed by provider prior to medication history?  N   (Y/N)     Prior to Admission medications    Medication Sig Last Dose Taking? Auth Provider   acetaminophen (TYLENOL) 325 MG tablet Take 650 mg by mouth every 6 hours as needed for mild pain (up to 6 tablets daily)  Yes Unknown, Entered By History   acetaminophen (TYLENOL) 500 MG tablet Take 1,000 mg by mouth every 6 hours as needed for mild pain (up to 6 tablets daily)  Yes Unknown, Entered By History   albuterol (PROVENTIL) (2.5 MG/3ML) 0.083% neb solution Take 2.5 mg by nebulization 2 times daily  1/9/2022 at Unknown time Yes Reported, Patient   cetirizine (ZYRTEC) 10 MG tablet Take 10 mg by mouth daily 1/9/2022 at Unknown time Yes Reported, Patient   cholecalciferol 25 MCG (1000 UT) TABS Take 1,000 Units by mouth daily 1/9/2022 at Unknown time Yes Reported, Patient   citalopram (CELEXA) 40 MG tablet Take 40 mg by mouth daily 1/9/2022 at Unknown time Yes Reported, Patient   diclofenac (VOLTAREN) 1 % topical gel Apply 1 g topically 2 times daily . To each knee 1/9/2022 at Unknown time Yes Unknown, Entered By History   fluticasone (FLONASE) 50 MCG/ACT nasal spray Spray 2 sprays into both nostrils daily 1/9/2022 at  Unknown time Yes Reported, Patient   KRISTALOSE 20 g packet Take 20 g by mouth 2 times daily  1/9/2022 at Unknown time Yes Reported, Patient   LORazepam (ATIVAN) 0.5 MG tablet Take 0.5 mg by mouth 2 times daily as needed for agitation or anxiety  Yes Unknown, Entered By History   LORazepam (ATIVAN) 1 MG tablet Take 1 mg by mouth 2 times daily as needed for agitation or anxiety  Yes Unknown, Entered By History   magnesium hydroxide (MILK OF MAGNESIA) 400 MG/5ML suspension Take 30 mLs by mouth daily 1/9/2022 at Unknown time Yes Reported, Patient   montelukast (SINGULAIR) 10 MG tablet Take 10 mg by mouth At Bedtime 1/9/2022 at HS Yes Unknown, Entered By History   OLANZapine (ZYPREXA) 2.5 MG tablet Take 2.5 mg by mouth At Bedtime 1/9/2022 at HS Yes Unknown, Entered By History   olopatadine (PATANOL) 0.1 % ophthalmic solution Place 1 drop Into the left eye 2 times daily 1/9/2022 at Unknown time Yes Unknown, Entered By History   pantoprazole (PROTONIX) 20 MG EC tablet Take 20 mg by mouth 2 times daily 1/9/2022 at Unknown time Yes Reported, Patient   senna (SENOKOT) 8.6 MG tablet Take 2 tablets by mouth 2 times daily  1/9/2022 at Unknown time Yes Reported, Patient   tiZANidine (ZANAFLEX) 4 MG tablet Take 4 mg by mouth 3 times daily 1/9/2022 at Unknown time Yes Reported, Patient

## 2022-01-10 NOTE — ED NOTES
[facility]  ED Nurse Handoff Report    Benito Marx is a 56 year old male   ED Chief complaint: Flu Symptoms  . ED Diagnosis:   Final diagnoses:   Infection due to 2019 novel coronavirus   Acute respiratory failure with hypoxia (H)   Meconium aspiration pneumonia, unspecified laterality, unspecified part of lung   Cerebral palsy, unspecified type (H)     Allergies:   Allergies   Allergen Reactions     Nutritional Supplements Nausea and Vomiting     increased vomiting when taking this.     Seasonal Allergies        Code Status: Full Code  Activity level - Baseline/Home:  Total Care. Activity Level - Current:   Total Care. Lift room needed: No. Bariatric: No   Needed: No, uses a communication board  Isolation: Yes. Infection: Not Applicable  COVID r/o and special precautions.     Vital Signs:   Vitals:    01/10/22 0115 01/10/22 0120 01/10/22 0130   BP: 99/75 94/67 101/62   Pulse: 73 71 67   Resp: 14 14 20   Temp: 98.6  F (37  C)     TempSrc: Oral     SpO2: 95% 93% 94%       Cardiac Rhythm:  ,      Pain level:    Patient confused: No. Patient Falls Risk: Yes.   Elimination Status: Has voided   Patient Report - Initial Complaint: decreased sats. Focused Assessment: patient lives at group home. Discharged earlier with home COVID monitoring kit. Staff called EMS due to sats 90-91%, Patient has CP and will say one-two words and nods yes/no. Has a communication board (group home will have to bring it back). PUREE diet. Thick liquids  Tests Performed: labs and imaging. Abnormal Results:   Labs Ordered and Resulted from Time of ED Arrival to Time of ED Departure   COMPREHENSIVE METABOLIC PANEL - Abnormal       Result Value    Sodium 143      Potassium 3.8      Chloride 108      Carbon Dioxide (CO2) 33 (*)     Anion Gap 2 (*)     Urea Nitrogen 21      Creatinine 0.68      Calcium 7.9 (*)     Glucose 120 (*)     Alkaline Phosphatase 80      AST 14      ALT 28      Protein Total 6.3 (*)     Albumin 2.9  (*)     Bilirubin Total 0.2      GFR Estimate >90     CBC WITH PLATELETS AND DIFFERENTIAL - Abnormal    WBC Count 3.9 (*)     RBC Count 3.90 (*)     Hemoglobin 12.4 (*)     Hematocrit 38.7 (*)     MCV 99      MCH 31.8      MCHC 32.0      RDW 12.0      Platelet Count 124 (*)     % Neutrophils 65      % Lymphocytes 27      % Monocytes 6      % Eosinophils 2      % Basophils 0      % Immature Granulocytes 0      NRBCs per 100 WBC 0      Absolute Neutrophils 2.5      Absolute Lymphocytes 1.1      Absolute Monocytes 0.2      Absolute Eosinophils 0.1      Absolute Basophils 0.0      Absolute Immature Granulocytes 0.0      Absolute NRBCs 0.0     LACTIC ACID WHOLE BLOOD - Normal    Lactic Acid 1.1     BLOOD CULTURE   BLOOD CULTURE   BLOOD CULTURE     Family Comments: no family in ED. Guardian updated on admit. Group home staff updated on admit   OBS brochure/video discussed/provided to patient:  No  ED Medications:   Medications   sodium chloride (PF) 0.9% PF flush 3 mL (has no administration in time range)   sodium chloride (PF) 0.9% PF flush 3 mL (3 mLs Intracatheter Given 1/10/22 0156)   ampicillin-sulbactam (UNASYN) 3 g vial to attach to  mL bag (3 g Intravenous New Bag 1/10/22 0155)   dexamethasone PF (DECADRON) injection 6 mg (6 mg Intravenous Given 1/10/22 0155)     Drips infusing:  No  For the majority of the shift, the patient's behavior Green.    Sepsis treatment initiated: No     Patient tested for COVID 19 prior to admission: YES    ED Nurse Name/Phone Number: Brielle Mahajan RN,   2:11 AM    RECEIVING UNIT ED HANDOFF REVIEW    Above ED Nurse Handoff Report was reviewed: Yes  Reviewed by: Tono Dickson RN on January 10, 2022 at 5:43 PM

## 2022-01-10 NOTE — PROGRESS NOTES
Patient seen and examined.  Chart reviewed.  Please see admission history and physical from earlier today by Dr. Mccall for details.    Restart dysphagia diet.  Speech pathology consult.    Continue treatment for COVID-19 pneumonia with concern for possible secondary aspiration bacterial pneumonia.

## 2022-01-10 NOTE — ED NOTES
Patient alert, non verbal, follows commands intermittently. Tolerated puree diet with honey thick liquids, feeder. Oxygen sats have been maintained on room air, patient continuously removes saturation monitor, not hypoxic during spot checks. Requires assist of 2 with ADLs

## 2022-01-10 NOTE — ED NOTES
Bed: ED02  Expected date:   Expected time:   Means of arrival:   Comments:  BV2 - COVID, hypoxic, hypotensive, red

## 2022-01-10 NOTE — ED PROVIDER NOTES
History   Chief Complaint:  Fever and Vomiting     The history is provided by the patient and the EMS personnel. History limited by: Cognitive impairment.      Benito Marx is a 56 year old male with history of cerebral palsy and prior aspiration pneumonia who presents with fever and vomiting. EMS reports that the patient has a history of aspiration pneumonia and today at his group home, he had an episode of vomiting but no clear history of aspiration event was provided. EMS states that the patient has had a fever throughout the day as well. No other complaints. Patient reportedly tested positive for COVID-19. Group home had called EMS due to concern of ongoing fevers and vomiting. EMS adds that patient is positive for Covid, however that result is not documented anywhere in his chart. Patient unable to provide any history.    Review of Systems   Unable to perform ROS: Other (Cognitive impairment)     Allergies:  Nutritional Supplements  Seasonal Allergies    Medications:  Ativan  Remeron  Singulair  Mycostatin  Zyprexa  Milk of magnesia  Protonix  Albuterol inhaler  Zyrtec  Celexa  Colace  Kristalose  Miralax   Senokot  Zanaflex    Past Medical History:     Adjustment disorder with depressed mood  Cerebral palsy  GERD  Aspiration pneumonia  Seizures  Mental retardation  Congenital hemiplegia    Past Surgical History:    Cholecystectomy    Social History:  Presents alone via EMS  Lives in group home    Physical Exam     Patient Vitals for the past 24 hrs:   BP Temp Temp src Pulse Resp SpO2   01/09/22 2220 -- 100.4  F (38  C) Temporal -- -- --   01/09/22 2150 110/79 -- -- 76 -- 98 %   01/09/22 2130 113/79 -- -- 75 -- 95 %   01/09/22 2120 -- -- -- -- -- 96 %   01/09/22 2115 105/77 -- -- -- -- 95 %   01/09/22 2015 -- -- -- 78 -- 99 %   01/09/22 2000 110/74 -- -- 81 -- 97 %   01/09/22 1927 124/87 (!) 100.7  F (38.2  C) Temporal 82 20 95 %       Physical Exam  General: Nontoxic. Awake and alert.   Head:  Scalp, face,  and head appear normal  Eyes:  Pupils are equal, round    Conjunctivae non-injected and sclerae white  ENT:    The external nose is normal    MMM.  Oropharynx clear without swelling or exudates.    Pinnae are normal  Neck:  Trachea is in the midline  CV:  Regular rate and rhythm     Normal S1/S2, no S3/S4    No murmur or rub. Radial pulses 2+ bilaterally.  Resp:  Lungs are clear and equal bilaterally  There is no tachypnea    No increased work of breathing    No rales, wheezing, or rhonchi  GI:  Abdomen is soft, no rigidity or guarding    No distension, or mass    No tenderness or rebound tenderness   MS:  No evidence of injury.   Skin:  No rash or acute skin lesions noted  Neuro: Awake and alert.  Evidence of cognitive impairment.  Patient occasionally answers in one-word answers but does not participate in conversation or answer complex questions.  No facial droop.  Patient moves upper extremities spontaneously.  Lower extremities appear to be paraplegic.  Psych:  Normal affect. Appropriate interactions.      Emergency Department Course   Imaging:  XR Abdomen 2 Views   Final Result   IMPRESSION: There is a large amount of air and stool throughout the colon. No distended air-filled loops of small bowel identified. No intraperitoneal free air identified. Status post cholecystectomy.      XR Chest 1 View   Final Result   IMPRESSION: Minor nonspecific patchy groundglass opacities. No pneumothorax or pleural effusion. Heart size stable.      Imaging features can be seen with (COVID-19)  pneumonia, though are nonspecific and can occur with a variety of infectious and noninfectious processes.        Report per radiology    Laboratory:  Symptomatic; Unknown Influenza A/B & SARS-CoV2 (Covid-19) Virus PCR Multiplex Nasopharyngeal: Pending    Procedures    Emergency Department Course:  Reviewed:  I reviewed nursing notes, vitals, past medical history, Care Everywhere and [facility]    Assessments/Consults:  ED Course as of  01/09/22 2232   Sun Jan 09, 2022 1958 I obtained history and examined the patient.   2206 Rechecked the patient and discussed plan.        Interventions:  2009 Zofran, 4 mg, PO  2010 Tylenol, 1,000 mg, PO    Disposition:  The patient was discharged to home.     Impression & Plan   Medical Decision Making:  Benito Marx is a 56 year old male with complex past medical history as noted above presents the emergency department for fever and vomiting.  Patient reportedly had a positive Covid test somewhere prior to arrival but this is not available for my review.  On my evaluation the patient is well-appearing, hemodynamically stable.  Temperature in the ED is 100.7.  He has no increased work of breathing, respiratory distress or hypoxia.  Abdominal exam is benign without evidence of peritonitis or acute surgical emergency. A broad differential diagnosis is considered.  Work-up in the emergency department is reassuring. COVID-19 test did return positive. CXR with evidence of mild COVID pneumonia. No dense consolidation or infiltrate to indicate bacterial pneumonia at this time. AXR without signs of bowel obstruction or volvulus. Patient's breathing remained normal in the ED without resp distress or hypoxia. His fever and nausea were treated and patient was requesting coffee to drink in the ED. He remained well appearing. No further vomiting observed. No indication for admission at this time. Close PCP follow up recommended in 2-3 days. Patient enrolled in [employer] and provided with home pulse oximeter. He is fully vaccinated which should help prevent serious complications/disease from COVID infection. Supportive care for home. Patient discharged in stable condition.    Covid-19  Benito Marx was evaluated during a global COVID-19 pandemic, which necessitated consideration that the patient might be at risk for infection with the SARS-CoV-2 virus that causes COVID-19.   Applicable protocols for evaluation were  followed during the patient's care.   COVID-19 was considered as part of the patient's evaluation. The plan for testing is:  a test was obtained during this visit.    Diagnosis:    ICD-10-CM    1. Pneumonia due to 2019 novel coronavirus  U07.1 COVID-19 [facility] Loop Referral    J12.82 Care Coordination Referral   2. Non-intractable vomiting, presence of nausea not specified, unspecified vomiting type  R11.10        Discharge Medications:  New Prescriptions    ONDANSETRON (ZOFRAN ODT) 4 MG ODT TAB    Take 1 tablet (4 mg) by mouth every 8 hours as needed for nausea or vomiting       Scribe Disclosure:  Christopher COLBY, am serving as a scribe at 7:45 PM on 1/9/2022 to document services personally performed by Adrien Brasher MD based on my observations and the provider's statements to me.            Adrien Brasher MD  01/10/22 1046

## 2022-01-11 ENCOUNTER — APPOINTMENT (OUTPATIENT)
Dept: SPEECH THERAPY | Facility: CLINIC | Age: 57
End: 2022-01-11
Attending: HOSPITALIST
Payer: MEDICAID

## 2022-01-11 ENCOUNTER — TELEPHONE (OUTPATIENT)
Dept: EMERGENCY MEDICINE | Facility: CLINIC | Age: 57
End: 2022-01-11
Payer: MEDICAID

## 2022-01-11 LAB
ANION GAP SERPL CALCULATED.3IONS-SCNC: 3 MMOL/L (ref 3–14)
ATRIAL RATE - MUSE: 41 BPM
BUN SERPL-MCNC: 28 MG/DL (ref 7–30)
CALCIUM SERPL-MCNC: 8.2 MG/DL (ref 8.5–10.1)
CHLORIDE BLD-SCNC: 112 MMOL/L (ref 94–109)
CO2 SERPL-SCNC: 29 MMOL/L (ref 20–32)
CREAT SERPL-MCNC: 0.7 MG/DL (ref 0.66–1.25)
CRP SERPL-MCNC: 19.2 MG/L (ref 0–8)
D DIMER PPP FEU-MCNC: 0.27 UG/ML FEU (ref 0–0.5)
DIASTOLIC BLOOD PRESSURE - MUSE: NORMAL MMHG
ERYTHROCYTE [DISTWIDTH] IN BLOOD BY AUTOMATED COUNT: 12 % (ref 10–15)
FIBRINOGEN PPP-MCNC: 441 MG/DL (ref 170–490)
GFR SERPL CREATININE-BSD FRML MDRD: >90 ML/MIN/1.73M2
GLUCOSE BLD-MCNC: 90 MG/DL (ref 70–99)
HCT VFR BLD AUTO: 40.9 % (ref 40–53)
HGB BLD-MCNC: 13.3 G/DL (ref 13.3–17.7)
INTERPRETATION ECG - MUSE: NORMAL
MCH RBC QN AUTO: 32.2 PG (ref 26.5–33)
MCHC RBC AUTO-ENTMCNC: 32.5 G/DL (ref 31.5–36.5)
MCV RBC AUTO: 99 FL (ref 78–100)
P AXIS - MUSE: 61 DEGREES
PLATELET # BLD AUTO: 131 10E3/UL (ref 150–450)
POTASSIUM BLD-SCNC: 4 MMOL/L (ref 3.4–5.3)
PR INTERVAL - MUSE: 204 MS
QRS DURATION - MUSE: 96 MS
QT - MUSE: 474 MS
QTC - MUSE: 391 MS
R AXIS - MUSE: 8 DEGREES
RBC # BLD AUTO: 4.13 10E6/UL (ref 4.4–5.9)
SODIUM SERPL-SCNC: 144 MMOL/L (ref 133–144)
SYSTOLIC BLOOD PRESSURE - MUSE: NORMAL MMHG
T AXIS - MUSE: 28 DEGREES
VENTRICULAR RATE- MUSE: 41 BPM
WBC # BLD AUTO: 3.8 10E3/UL (ref 4–11)

## 2022-01-11 PROCEDURE — 86140 C-REACTIVE PROTEIN: CPT | Performed by: HOSPITALIST

## 2022-01-11 PROCEDURE — 92610 EVALUATE SWALLOWING FUNCTION: CPT | Mod: GN

## 2022-01-11 PROCEDURE — 80048 BASIC METABOLIC PNL TOTAL CA: CPT | Performed by: HOSPITALIST

## 2022-01-11 PROCEDURE — 250N000013 HC RX MED GY IP 250 OP 250 PS 637: Performed by: HOSPITALIST

## 2022-01-11 PROCEDURE — 85379 FIBRIN DEGRADATION QUANT: CPT | Performed by: HOSPITALIST

## 2022-01-11 PROCEDURE — 36415 COLL VENOUS BLD VENIPUNCTURE: CPT | Performed by: HOSPITALIST

## 2022-01-11 PROCEDURE — 258N000003 HC RX IP 258 OP 636: Performed by: HOSPITALIST

## 2022-01-11 PROCEDURE — 120N000001 HC R&B MED SURG/OB

## 2022-01-11 PROCEDURE — 250N000011 HC RX IP 250 OP 636: Performed by: HOSPITALIST

## 2022-01-11 PROCEDURE — 250N000009 HC RX 250: Performed by: HOSPITALIST

## 2022-01-11 PROCEDURE — 85384 FIBRINOGEN ACTIVITY: CPT | Performed by: HOSPITALIST

## 2022-01-11 PROCEDURE — 85027 COMPLETE CBC AUTOMATED: CPT | Performed by: HOSPITALIST

## 2022-01-11 RX ADMIN — SODIUM CHLORIDE 50 ML: 9 INJECTION, SOLUTION INTRAVENOUS at 16:10

## 2022-01-11 RX ADMIN — ENOXAPARIN SODIUM 40 MG: 40 INJECTION SUBCUTANEOUS at 06:32

## 2022-01-11 RX ADMIN — REMDESIVIR 100 MG: 100 INJECTION, POWDER, LYOPHILIZED, FOR SOLUTION INTRAVENOUS at 16:08

## 2022-01-11 RX ADMIN — AMPICILLIN SODIUM AND SULBACTAM SODIUM 3 G: 2; 1 INJECTION, POWDER, FOR SOLUTION INTRAMUSCULAR; INTRAVENOUS at 13:46

## 2022-01-11 RX ADMIN — AMPICILLIN SODIUM AND SULBACTAM SODIUM 3 G: 2; 1 INJECTION, POWDER, FOR SOLUTION INTRAMUSCULAR; INTRAVENOUS at 20:23

## 2022-01-11 RX ADMIN — DEXAMETHASONE 6 MG: 2 TABLET ORAL at 13:46

## 2022-01-11 RX ADMIN — AMPICILLIN SODIUM AND SULBACTAM SODIUM 3 G: 2; 1 INJECTION, POWDER, FOR SOLUTION INTRAMUSCULAR; INTRAVENOUS at 08:22

## 2022-01-11 RX ADMIN — POLYETHYLENE GLYCOL 3350 17 G: 17 POWDER, FOR SOLUTION ORAL at 08:22

## 2022-01-11 RX ADMIN — AMPICILLIN SODIUM AND SULBACTAM SODIUM 3 G: 2; 1 INJECTION, POWDER, FOR SOLUTION INTRAMUSCULAR; INTRAVENOUS at 01:56

## 2022-01-11 ASSESSMENT — ACTIVITIES OF DAILY LIVING (ADL)
WEAR_GLASSES_OR_BLIND: NO
COMMUNICATION: UNABLE TO SPEAK
DIFFICULTY_EATING/SWALLOWING: YES
ADLS_ACUITY_SCORE: 40
TOILETING_ASSISTANCE: TOILETING DIFFICULTY, DEPENDENT
ADLS_ACUITY_SCORE: 26
ADLS_ACUITY_SCORE: 26
ADLS_ACUITY_SCORE: 40
ADLS_ACUITY_SCORE: 42
ADLS_ACUITY_SCORE: 32
ADLS_ACUITY_SCORE: 32
ADLS_ACUITY_SCORE: 42
FALL_HISTORY_WITHIN_LAST_SIX_MONTHS: YES
TOILETING_ISSUES: YES
DRESSING/BATHING: BATHING DIFFICULTY, DEPENDENT;DRESSING DIFFICULTY, DEPENDENT
WALKING_OR_CLIMBING_STAIRS_DIFFICULTY: YES
ADLS_ACUITY_SCORE: 32
ADLS_ACUITY_SCORE: 32
ADLS_ACUITY_SCORE: 30
ADLS_ACUITY_SCORE: 30
EQUIPMENT_CURRENTLY_USED_AT_HOME: WHEELCHAIR, POWER
ADLS_ACUITY_SCORE: 32
ADLS_ACUITY_SCORE: 30
DRESSING/BATHING_DIFFICULTY: YES
DOING_ERRANDS_INDEPENDENTLY_DIFFICULTY: YES
ADLS_ACUITY_SCORE: 30
ADLS_ACUITY_SCORE: 40
CONCENTRATING,_REMEMBERING_OR_MAKING_DECISIONS_DIFFICULTY: YES
ADLS_ACUITY_SCORE: 30
WALKING_OR_CLIMBING_STAIRS: AMBULATION DIFFICULTY, DEPENDENT;TRANSFERRING DIFFICULTY, DEPENDENT
DIFFICULTY_COMMUNICATING: YES
ADLS_ACUITY_SCORE: 38
ADLS_ACUITY_SCORE: 40
ADLS_ACUITY_SCORE: 32
ADLS_ACUITY_SCORE: 30
ADLS_ACUITY_SCORE: 32
ADLS_ACUITY_SCORE: 30
ADLS_ACUITY_SCORE: 40
HEARING_DIFFICULTY_OR_DEAF: NO

## 2022-01-11 NOTE — PROGRESS NOTES
"   01/11/22 1346   General Information   Onset of Illness/Injury or Date of Surgery 01/09/22   Referring Physician Dr. Sctot   Patient/Family Therapy Goal Statement (SLP) Not stated   Pertinent History of Current Problem Per H&P, \"Benito Marx is a 56 year old male was brought to the ER by ambulance from care home for hypoxia.  Patient was seen in the ER earlier that is last night for possible aspiration as patient had vomiting associated fevers.  Patient lives in group home and has history of cerebral palsy with quadriplegia.  Patient has history of recurrent aspiration pneumonias and it looks like he is on a special diet.\" On puree diet with pudding thick (extremely thick) liquid per home facility records.   General Observations Pt awake with poor attention, not following commands and no verbal output.   Past History of Dysphagia On puree diet with pudding/extremely thick liquid per records.   Type of Evaluation   Type of Evaluation Swallow Evaluation   Oral Motor   Oral Musculature unable to assess due to poor participation/comprehension   Dentition (Oral Motor)   Dentition (Oral Motor) edentulous   Vocal Quality/Secretion Management (Oral Motor)   Secretion Management (Oral Motor)   (baseline cough)   General Swallowing Observations   Current Diet/Method of Nutritional Intake (General Swallowing Observations, NIS) pureed (level 4);mildly thick liquids (level 2)   Swallowing Evaluation Clinical swallow evaluation   Clinical Swallow Evaluation   Feeding Assistance dependent   Clinical Swallow Evaluation Textures Trialed extremely thick liquids;pureed   Clinical Swallow Eval: Extremely Thick Liquids   Mode of Presentation spoon   Oral Phase delayed AP movement   Pharyngeal Phase suspect grossly intact for extremely thick liquid via tsp   Clinical Swallow Evaluation: Puree Solid Texture Trial   Mode of Presentation, Puree spoon   Oral Phase, Puree delayed AP movement   Pharyngeal Phase, Puree suspect grossly " intact for puree   Swallowing Recommendations   Diet Consistency Recommendations pureed (level 4);extremely thick liquids (level 4)   Supervision Level for Intake 1:1 supervision needed   Mode of Delivery Recommendations bolus size, small;liquids via spoon only;no cups;no straws;slow rate of intake   Swallowing Maneuver Recommendations alternate food and liquid intake   Monitoring/Assistance Required (Eating/Swallowing) stop eating activities when fatigue is present;monitor for cough or change in vocal quality with intake   Recommended Feeding/Eating Techniques (Swallow Eval) maintain upright posture during/after eating for 30 minutes;minimize distractions during oral intake;provide assist with feeding;provide 6 smaller meals throughout day;provide oral hygiene prior to intake   Medication Administration Recommendations, Swallowing (SLP) crushed in puree   Instrumental Assessment Recommendations instrumental evaluation not recommended at this time  (may consider as indicated)   Comment, Swallowing Recommendations Pt currently presents with moderate oral and suspected pharyngeal dysphagia. Administered limited trials of puree and extremely thick liquid via tsp (pt recently finished lunch and declined further trials). Pt with fair acceptance and containment. Bolus formation/propulsion and lingual coordination appeared ~moderately reduced and prolonged. Suspect delayed swallow with reduced hyolaryngeal elevation. No cough, throat clear, wet vocal quality, eye watering, or increased WOB.   General Therapy Interventions   Planned Therapy Interventions Dysphagia Treatment   Dysphagia treatment Modified diet education;Instruction of safe swallow strategies   SLP Therapy Assessment/Plan   Criteria for Skilled Therapeutic Interventions Met (SLP Eval) yes;treatment indicated   SLP Diagnosis moderate oral and suspected pharyngeal dysphagia   Rehab Potential (SLP Eval) fair, will monitor progress closely   Therapy Frequency  (SLP Eval) 2 times/wk   Predicted Duration of Therapy Intervention (SLP Eval) 1 week   Therapy Plan Review/Discharge Plan (SLP)   Therapy Plan Review (SLP) evaluation/treatment results reviewed;patient   SLP Discharge Planning    SLP Discharge Recommendation (DC Rec)   (prior environment)   SLP Rationale for DC Rec Suspect baseline level of dysphagia   SLP Brief overview of current status  Recommend downgrade to puree diet (4) with extremely thick liquid (4) by tsp -- upright posture, no cup/straw, slow rate, 1:1 assistance.    Total Evaluation Time   Total Evaluation Time (Minutes) 12

## 2022-01-11 NOTE — UTILIZATION REVIEW
Admission Status; Secondary Review Determination       Under the authority of the Utilization Management Committee, the utilization review process indicated a secondary review on the above patient. The review outcome is based on review of the medical records, discussions with staff, and applying clinical experience noted on the date of the review.     (x) Inpatient Status Appropriate - This patient's medical care is consistent with medical management for inpatient care and reasonable inpatient medical practice.     RATIONALE FOR DETERMINATION   56 year old male was [facility].  Patient was seen in the ER earlier that is last night for possible aspiration as patient had vomiting associated fevers.  Patient lives in group home and has history of cerebral palsy with quadriplegia.  Patient has history of recurrent aspiration pneumonias   Patient requires inpatient admission versus short stay observation or outpatient treatment for the following reasons: Complex history of aspiration requiring intravenous antibiotic because of significant concern for systemic inflammatory response, fever, hypotension, leukopenia, concern for concomitant COVID-19 infection on Decadron and remdesivir IV, needing oxygen supplementation, n.p.o. status and speech therapy evaluation needed prior to resuming p.o.  The expected length of stay at the time of admission was more than 2 nights because of the severity of illness, intensity of service provided, and risk for adverse outcome. Inpatient admission is appropriate.         This document was produced using voice recognition software       The information on this document is developed by the utilization review team in order for the business office to ensure compliance. This only denotes the appropriateness of proper admission status and does not reflect the quality of care rendered.   The definitions of Inpatient Status and Observation Status  used in making the determination above are those provided in the CMS Coverage Manual, Chapter 1 and Chapter 6, section 70.4.   Sincerely,   KAMILLE MANNING MD   System Medical Director   Utilization Management   [facility].

## 2022-01-11 NOTE — TELEPHONE ENCOUNTER
Coronavirus (COVID-19) Notification    Reason for call  Notify of POSITIVE  COVID-19 lab result, assess symptoms,  review [facility] recommendations    Lab Result   Lab test for 2019-nCoV rRt-PCR or SARS-COV-2 PCR  Oropharyngeal AND/OR nasopharyngeal swabs were POSITIVE for 2019-nCoV RNA [OR] SARS-COV-2 RNA (COVID-19) RNA     We have been unable to reach Patient by phone at this time to notify of their Positive COVID-19 result.  Left voicemail message requesting a call back to [phone] [facility] for results.        POSITIVE COVID-19 Letter sent.    Jackie Grayson LPN

## 2022-01-11 NOTE — CONSULTS
Care Management Initial Consult    General Information  Assessment completed with: Caregiver,    Type of CM/SW Visit: Initial Assessment    Primary Care Provider verified and updated as needed:     Readmission within the last 30 days: no previous admission in last 30 days      Reason for Consult: care coordination/care conference,discharge planning    Communication Assessment  Patient's communication style: spoken language (English or Bilingual)    Hearing Difficulty or Deaf: no   Wear Glasses or Blind: no    Cognitive  Cognitive/Neuro/Behavioral: .WDL except  Level of Consciousness: alert  Arousal Level: arouses to voice,arouses to touch/gentle shaking  Orientation: other (see comments) ([place])  Mood/Behavior: cooperative  Best Language: 3 - Mute  Speech: unable to speak    Living Environment:   People in home: facility resident     Current living Arrangements: group home      Able to return to prior arrangements: yes     Family/Social Support:  Care provided by: other (see comments) (staff)  Provides care for: no one, unable/limited ability to care for self  Marital Status: Single  Parent(s),Guardian          Description of Support System: Supportive,Involved    Support Assessment: Adequate family and caregiver support    Current Resources:   Patient receiving home care services: No  Community Resources: None  Equipment currently used at home: wheelchair, power  Supplies currently used at home: None    Lifestyle & Psychosocial Needs:  Social Determinants of Health     Tobacco Use: Low Risk      Smoking Tobacco Use: Never Smoker     Smokeless Tobacco Use: Never Used   Alcohol Use: Not on file   Financial Resource Strain: Not on file   Food Insecurity: Not on file   Transportation Needs: Not on file   Physical Activity: Not on file   Stress: Not on file   Social Connections: Not on file   Intimate Partner Violence: Not on file   Depression: Not on file   Housing Stability: Not on file     Additional Information:  Pt  admitted with COVID-19 PNA and concern for possible aspiration PNA, noted to have unplanned readmission risk of 21%. Called pt's mother/guardian Lucy [phone] to verify baseline prior to admission, unable to reach, LM requesting call back.     Called pt's group home, [facility]  [phone], spoke with staff Gabriella and Henry as  is out of the office. They report that pt transfers with assist of 1 and WW at baseline but is otherwise WC bound. They assist with feeding, dressing, toileting, medication management and bathing.  normally transport to/from appointments but due to their staffing they do not believe they will be able to provide transport at time of discharge. Will need to discuss transport options/plan with mother when she calls back.    Need to also confirm  fax and preferred pharmacy with  coordinator.     Cecily Borja RN BSN   Inpatient Care Coordination  [facility]   Phone [phone]

## 2022-01-11 NOTE — PLAN OF CARE
End of Shift Summary  For vital signs and complete assessments, please see documentation flowsheets.     Pertinent assessments: VSS. Alert. Non-verbal. Able to nod head yes/no and 1 finger means yes and 2 fingers means no.  Ls clear/diminished. Bowel sounds active. Fecal incontinence. incontinent of urine. No emesis this shift.     Major Shift Events: uneventful    Treatment Plan: Unasyn, Remdesivir, Decadron, Lovenox.

## 2022-01-11 NOTE — PLAN OF CARE
To Do:  End of Shift Summary  For vital signs and complete assessments, please see documentation flowsheets.     Pertinent assessments: Patient admitted to floor. Nonverbal. Alert. Nods head yes/no. VSS. Ls clear/diminished. Bowel sounds active. Skin intact.  Major Shift Events Admitted  Treatment Plan: Abx,covid treatment plan  Bedside Nurse: Tono Dickson RN

## 2022-01-11 NOTE — PLAN OF CARE
End of Shift Summary  For vital signs and complete assessments, please see documentation flowsheets.     Pertinent assessments: Patient admitted to floor.  Alert. Nods head yes/no. VSS. Ls clear/diminished. Bowel sounds active. Skin intact.incontinent of urine. Emesis X 2. Zofran given.   Major Shift Events none  Treatment Plan: Unasyn, Remdesivir, Decadron, Lovenox.   Bedside Nurse Mary Fofana RN

## 2022-01-12 ENCOUNTER — DOCUMENTATION ONLY (OUTPATIENT)
Dept: OTHER | Facility: CLINIC | Age: 57
End: 2022-01-12
Payer: MEDICAID

## 2022-01-12 LAB
ANION GAP SERPL CALCULATED.3IONS-SCNC: <1 MMOL/L (ref 3–14)
BUN SERPL-MCNC: 32 MG/DL (ref 7–30)
CALCIUM SERPL-MCNC: 8.3 MG/DL (ref 8.5–10.1)
CHLORIDE BLD-SCNC: 113 MMOL/L (ref 94–109)
CO2 SERPL-SCNC: 30 MMOL/L (ref 20–32)
CREAT SERPL-MCNC: 0.63 MG/DL (ref 0.66–1.25)
CRP SERPL-MCNC: 11.8 MG/L (ref 0–8)
D DIMER PPP FEU-MCNC: <0.27 UG/ML FEU (ref 0–0.5)
ERYTHROCYTE [DISTWIDTH] IN BLOOD BY AUTOMATED COUNT: 12.1 % (ref 10–15)
FIBRINOGEN PPP-MCNC: 360 MG/DL (ref 170–490)
GFR SERPL CREATININE-BSD FRML MDRD: >90 ML/MIN/1.73M2
GLUCOSE BLD-MCNC: 103 MG/DL (ref 70–99)
HCT VFR BLD AUTO: 39.5 % (ref 40–53)
HGB BLD-MCNC: 13 G/DL (ref 13.3–17.7)
MCH RBC QN AUTO: 32 PG (ref 26.5–33)
MCHC RBC AUTO-ENTMCNC: 32.9 G/DL (ref 31.5–36.5)
MCV RBC AUTO: 97 FL (ref 78–100)
PLATELET # BLD AUTO: 134 10E3/UL (ref 150–450)
POTASSIUM BLD-SCNC: 3.9 MMOL/L (ref 3.4–5.3)
RBC # BLD AUTO: 4.06 10E6/UL (ref 4.4–5.9)
SODIUM SERPL-SCNC: 143 MMOL/L (ref 133–144)
WBC # BLD AUTO: 4.7 10E3/UL (ref 4–11)

## 2022-01-12 PROCEDURE — 85384 FIBRINOGEN ACTIVITY: CPT | Performed by: HOSPITALIST

## 2022-01-12 PROCEDURE — 250N000013 HC RX MED GY IP 250 OP 250 PS 637: Performed by: HOSPITALIST

## 2022-01-12 PROCEDURE — 86140 C-REACTIVE PROTEIN: CPT | Performed by: HOSPITALIST

## 2022-01-12 PROCEDURE — 99232 SBSQ HOSP IP/OBS MODERATE 35: CPT | Performed by: HOSPITALIST

## 2022-01-12 PROCEDURE — 85379 FIBRIN DEGRADATION QUANT: CPT | Performed by: HOSPITALIST

## 2022-01-12 PROCEDURE — 250N000009 HC RX 250: Performed by: HOSPITALIST

## 2022-01-12 PROCEDURE — 258N000003 HC RX IP 258 OP 636: Performed by: HOSPITALIST

## 2022-01-12 PROCEDURE — 120N000001 HC R&B MED SURG/OB

## 2022-01-12 PROCEDURE — 250N000011 HC RX IP 250 OP 636: Performed by: HOSPITALIST

## 2022-01-12 PROCEDURE — 82310 ASSAY OF CALCIUM: CPT | Performed by: HOSPITALIST

## 2022-01-12 PROCEDURE — 36415 COLL VENOUS BLD VENIPUNCTURE: CPT | Performed by: HOSPITALIST

## 2022-01-12 PROCEDURE — 250N000013 HC RX MED GY IP 250 OP 250 PS 637: Performed by: STUDENT IN AN ORGANIZED HEALTH CARE EDUCATION/TRAINING PROGRAM

## 2022-01-12 PROCEDURE — 85027 COMPLETE CBC AUTOMATED: CPT | Performed by: HOSPITALIST

## 2022-01-12 RX ORDER — CITALOPRAM HYDROBROMIDE 20 MG/1
40 TABLET ORAL DAILY
Status: DISCONTINUED | OUTPATIENT
Start: 2022-01-12 | End: 2022-01-13 | Stop reason: HOSPADM

## 2022-01-12 RX ORDER — OLOPATADINE HYDROCHLORIDE 1 MG/ML
1 SOLUTION/ DROPS OPHTHALMIC 2 TIMES DAILY
Status: DISCONTINUED | OUTPATIENT
Start: 2022-01-12 | End: 2022-01-13 | Stop reason: HOSPADM

## 2022-01-12 RX ORDER — PANTOPRAZOLE SODIUM 20 MG/1
20 TABLET, DELAYED RELEASE ORAL 2 TIMES DAILY
Status: DISCONTINUED | OUTPATIENT
Start: 2022-01-12 | End: 2022-01-13 | Stop reason: HOSPADM

## 2022-01-12 RX ORDER — MONTELUKAST SODIUM 10 MG/1
10 TABLET ORAL AT BEDTIME
Status: DISCONTINUED | OUTPATIENT
Start: 2022-01-12 | End: 2022-01-13 | Stop reason: HOSPADM

## 2022-01-12 RX ORDER — OLANZAPINE 2.5 MG/1
2.5 TABLET, FILM COATED ORAL AT BEDTIME
Status: DISCONTINUED | OUTPATIENT
Start: 2022-01-12 | End: 2022-01-13 | Stop reason: HOSPADM

## 2022-01-12 RX ORDER — LACTULOSE 10 G/15ML
20 SOLUTION ORAL 2 TIMES DAILY
Status: DISCONTINUED | OUTPATIENT
Start: 2022-01-12 | End: 2022-01-13 | Stop reason: HOSPADM

## 2022-01-12 RX ORDER — LORAZEPAM 0.5 MG/1
0.5 TABLET ORAL 2 TIMES DAILY PRN
Status: DISCONTINUED | OUTPATIENT
Start: 2022-01-12 | End: 2022-01-13 | Stop reason: HOSPADM

## 2022-01-12 RX ADMIN — AMOXICILLIN AND CLAVULANATE POTASSIUM 1 TABLET: 875; 125 TABLET, FILM COATED ORAL at 20:39

## 2022-01-12 RX ADMIN — LACTULOSE 20 G: 20 SOLUTION ORAL at 12:54

## 2022-01-12 RX ADMIN — MAGNESIUM HYDROXIDE 30 ML: 400 SUSPENSION ORAL at 12:58

## 2022-01-12 RX ADMIN — PANTOPRAZOLE SODIUM 20 MG: 20 TABLET, DELAYED RELEASE ORAL at 20:39

## 2022-01-12 RX ADMIN — PANTOPRAZOLE SODIUM 20 MG: 20 TABLET, DELAYED RELEASE ORAL at 12:52

## 2022-01-12 RX ADMIN — AMPICILLIN SODIUM AND SULBACTAM SODIUM 3 G: 2; 1 INJECTION, POWDER, FOR SOLUTION INTRAMUSCULAR; INTRAVENOUS at 15:23

## 2022-01-12 RX ADMIN — DICLOFENAC SODIUM 1 G: 10 GEL TOPICAL at 13:01

## 2022-01-12 RX ADMIN — DICLOFENAC SODIUM 1 G: 10 GEL TOPICAL at 20:44

## 2022-01-12 RX ADMIN — OLOPATADINE HYDROCHLORIDE 1 DROP: 1 SOLUTION OPHTHALMIC at 13:11

## 2022-01-12 RX ADMIN — SODIUM CHLORIDE 50 ML: 9 INJECTION, SOLUTION INTRAVENOUS at 16:38

## 2022-01-12 RX ADMIN — TIZANIDINE 4 MG: 4 TABLET ORAL at 15:23

## 2022-01-12 RX ADMIN — CITALOPRAM HYDROBROMIDE 40 MG: 20 TABLET ORAL at 12:52

## 2022-01-12 RX ADMIN — POLYETHYLENE GLYCOL 3350 17 G: 17 POWDER, FOR SOLUTION ORAL at 08:37

## 2022-01-12 RX ADMIN — AMPICILLIN SODIUM AND SULBACTAM SODIUM 3 G: 2; 1 INJECTION, POWDER, FOR SOLUTION INTRAMUSCULAR; INTRAVENOUS at 02:14

## 2022-01-12 RX ADMIN — DEXAMETHASONE 6 MG: 2 TABLET ORAL at 12:51

## 2022-01-12 RX ADMIN — AMPICILLIN SODIUM AND SULBACTAM SODIUM 3 G: 2; 1 INJECTION, POWDER, FOR SOLUTION INTRAMUSCULAR; INTRAVENOUS at 08:39

## 2022-01-12 RX ADMIN — REMDESIVIR 100 MG: 100 INJECTION, POWDER, LYOPHILIZED, FOR SOLUTION INTRAVENOUS at 16:38

## 2022-01-12 RX ADMIN — LACTULOSE 20 G: 20 SOLUTION ORAL at 20:39

## 2022-01-12 RX ADMIN — ENOXAPARIN SODIUM 40 MG: 40 INJECTION SUBCUTANEOUS at 06:17

## 2022-01-12 ASSESSMENT — ACTIVITIES OF DAILY LIVING (ADL)
ADLS_ACUITY_SCORE: 42

## 2022-01-12 NOTE — PROGRESS NOTES
End of Shift Summary  For vital signs and complete assessments, please see documentation flowsheets.     Pertinent assessments: Pt alert, non verbal. On RA, no signs of pain. LS diminished. Incont of B&B    Major Shift Events: emesis x 3, pt pulled PIV, new IV placed, sitter at bedside     Treatment Plan: Unasyn, Remdesivir, Decadron, Lovenox.     Bedside Nurse:Katie Monteiro RN

## 2022-01-12 NOTE — CONSULTS
CLINICAL NUTRITION SERVICES  -  ASSESSMENT NOTE      Recommendations Ordered by Registered Dietitian (RD): Diet per speech   Malnutrition: % Weight Loss: Unable to determine d/t lack of wt history  % Intake:  No decreased intake during admit  Subcutaneous Fat Loss: unable to observe  Muscle Loss:  Temporal region severe depletion  Fluid Retention:  None noted   Malnutrition Diagnosis: Unable to determine due to lack of nutrition history        REASON FOR ASSESSMENT  Benito Marx is a 56 year old male seen by Registered Dietitian for Admission Nutrition Risk Screen for positive      NUTRITION HISTORY  - Information obtained from chart  Pt is non verbal, group home  NKFA  Baseline diet, pureed, extremely thick liquids per Speech Therapy note      CURRENT NUTRITION ORDERS  Diet Order:     Dysphagia Pureed, extremely thick     Current Intake/Tolerance:  % of meals per flowsheet  Requires feeding  Had emesis episodes with chocolate pudding?      NUTRITION FOCUSED PHYSICAL ASSESSMENT FOR DIAGNOSING MALNUTRITION)  Yes, visual only via doorway COVID+    Observed:    Muscle wasting (refer to documentation in Malnutrition section)    Obtained from Chart/Interdisciplinary Team:  Hypoxia, N/V, COVID+, cerebral palsy, constipation, GERD, seizures, moderate mental retardation, nasal cannula 2L, aspiration pneumonia,     ANTHROPOMETRICS  Height: 5'  Weight: 99 lbs 6.84 oz  BMI: 19.42  Weight Status:  Normal BMI  IBW: 106 lbs  % IBW: 93%  Weight History:   Wt Readings from Last 3 Encounters:   01/10/22 45.1 kg (99 lb 6.8 oz)   10/15/18 45.8 kg (101 lb)   Unable to see wt trends d/t no current wt history    LABS  Labs reviewed    MEDICATIONS  Medications reviewed      ASSESSED NUTRITION NEEDS PER APPROVED PRACTICE GUIDELINES:    Dosing Weight 45 kg  Estimated Energy Needs: (30-35 Kcal/Kg)  Justification: COVID+  Estimated Protein Needs: (1.2-1.5 g pro/Kg)  Justification: hypercatabolism with acute illness COVID+  Estimated  Fluid Needs: (1 mL/Kcal)  Justification: maintenance    MALNUTRITION:  % Weight Loss: Unable to determine d/t lack of wt history  % Intake:  No decreased intake during admit  Subcutaneous Fat Loss: unable to observe  Muscle Loss:  Temporal region severe depletion  Fluid Retention:  None noted     Malnutrition Diagnosis: Unable to determine due to lack of nutrition history    NUTRITION DIAGNOSIS:  Predicted inadequate energy intake related to acute illness (COVID-19), pt requires feeding, and emesis episodes.      NUTRITION INTERVENTIONS  Recommendations / Nutrition Prescription  Diet per speech  .      Implementation  Nutrition education: No education needs assessed at this time  Collaboration and Referral of Nutrition care  .      Nutrition Goals  >75% of meals TID.  .      MONITORING AND EVALUATION:  Progress towards goals will be monitored and evaluated per protocol and Practice Guidelines    Kathy Roberts  Dietetic Intern

## 2022-01-12 NOTE — PROGRESS NOTES
Care Management Follow Up    Length of Stay (days): 2    Expected Discharge Date: 01/13/2022     Concerns to be Addressed: care coordination/care conferences,discharge planning     Patient plan of care discussed at interdisciplinary rounds: Yes    Anticipated Discharge Disposition: Group Home  [facility]      Anticipated Discharge Services: None  Anticipated Discharge DME: None    Patient/family educated on Medicare website which has current facility and service quality ratings: no  Education Provided on the Discharge Plan:  Spoke with pt's Current Guardian   Patient/Family in Agreement with the Plan: Yes         Private pay costs discussed: Not applicable    Additional Information:  ANIA received PC from a Lucy Marx who we have listed as pt's Guardian.. she is NOT and does not know who this pt is. Sent request to [employer] to update face sheet. ANIA did call and speak with correct Guardian Suzanne Wells [phone].. Requested paper work fax over today to get into pt's chart.    ANIA spoke with  staff, They are able to accept pt back as late as 9 pm if d c ready today. ANIA spoke with Mirtha. [facility] is on at 1300 her cell is [phone]    Ok to fill any new meds here..  staff may be able to transport home.,      ANIA spoke with [facility] . She can pick pt up tomorrow between 7 and 3 pm.    Pharmacy alternative of [place].     So if any new meds fill here and send with .  Pt is on a an nebulizer inhaler 2x day.  staff want to ensure they should continue this treatment      01/12/22 1100   Final Resources   Group Home Contact Info [facility] [phone]   Group Home Status Active       Corinne C. White, LSW

## 2022-01-12 NOTE — PROGRESS NOTES
[facility]    Hospitalist Progress Note      Assessment & Plan   Benito Marx is a 56 year old male was brought to the ER by ambulance from custodial for hypoxia.  Patient was seen in the ER earlier that is last night for possible aspiration as patient had vomiting associated fevers.  Patient lives in group home and has history of cerebral palsy with quadriplegia.      #Acute hypoxemic respiratory failure secondary to COVID-19 vs. Aspiration pneumonia: Patient is nonverbal with quadriplegia.  He has a history of recurrent aspiration pneumonias.  He was found to be COVID-19 positive on 1/9.  His ER work-up showed that he was afebrile and hemodynamically stable.  Needing 2 L oxygen via nasal cannula to maintain saturations.  Chest x-ray shows clear lungs without any focal consolidation.  -Patient is currently afebrile and hemodynamically stable.  Saturating well on room air.  -Continuous pulse oximetry.  Follow inflammatory markers.  -Unasyn for 5-day course for possible aspiration.  -Given hypoxemia on presentation, dexamethasone for 10-day course and remdesivir for 5-day course.  -Lovenox for prophylaxis  -Maintain precautions. Continuous pulse oximetry. Trend labs.  CRP better.      #Cerebral palsy, depression/anxiety, intellectual disability: At baseline, pt reportedly is in bed or wheelchair.  He communicates via picture board at his group home.  Pt is chronically on olanzapine 2.5 mg at night, citalopram 40 mg daily, tizanidine 4 mg TID.  He uses ativan prn.  These meds have been resumed.       #GERD: Continue PPI  #Dysphagia: Seen by speech. Modified diet restarted and tolerating feeds with nursing.     DVT Prophylaxis: Enoxaparin (Lovenox) SQ  Code Status: Full Code  Dispo: Suspect should be ready to discharge tomorrow if does well overnight.     Josh Duncan MD  Text Page    Interval History   Assumed care.  No events. Discussed with SW and nursing.  He is nonverbal at baseline and  communicates at group home via picture board.  He is tolerating feeds with modified diet with nursing staff.  He appears comfortable. Somewhat interactive.      -Data reviewed today: I reviewed all new labs and imaging results over the last 24 hours.     Physical Exam   Temp: 98.5  F (36.9  C) Temp src: Oral BP: 105/60 Pulse: 63   Resp: 20 SpO2: 95 % O2 Device: None (Room air)    Vitals:    01/10/22 1409   Weight: 45.1 kg (99 lb 6.8 oz)     Vital Signs with Ranges  Temp:  [98.1  F (36.7  C)-98.8  F (37.1  C)] 98.5  F (36.9  C)  Pulse:  [63-85] 63  Resp:  [18-20] 20  BP: ()/(59-88) 105/60  SpO2:  [92 %-96 %] 95 %  I/O last 3 completed shifts:  In: 1666 [P.O.:520; I.V.:1146]  Out: -     Constitutional: alert, nonverbal, doesn't appear in distress  HEENT: MMM, no oral lesions. No elevation of JVD noted  Respiratory: moves air bilaterally. No wheeze or crackles noted.   Cardiovascular: RRR, no murmur  GI: BS+. Thin. NT.   Ext: Thin. No edema.   Neuropsychiatric:Quadriplegic and nonverbal at baseline.       Medications     - MEDICATION INSTRUCTIONS -         remdesivir  100 mg Intravenous Q24H    And     sodium chloride 0.9%  50 mL Intravenous Q24H     ampicillin-sulbactam (UNASYN) IV  3 g Intravenous Q6H     dexamethasone  6 mg Oral Daily     enoxaparin ANTICOAGULANT  40 mg Subcutaneous Q24H     polyethylene glycol  17 g Oral Daily     sodium chloride (PF)  3 mL Intracatheter Q8H     sodium chloride (PF)  3 mL Intracatheter Q8H       Data   Recent Labs   Lab 01/12/22  0709 01/11/22  0843 01/10/22  0836 01/10/22  0117   WBC 4.7 3.8*  --  3.9*   HGB 13.0* 13.3  --  12.4*   MCV 97 99  --  99   * 131*  --  124*   INR  --   --  1.01  --     144  --  143   POTASSIUM 3.9 4.0  --  3.8   CHLORIDE 113* 112*  --  108   CO2 30 29  --  33*   BUN 32* 28  --  21   CR 0.63* 0.70 0.67 0.68   ANIONGAP <1* 3  --  2*   ASTRID 8.3* 8.2*  --  7.9*   * 90  --  120*   ALBUMIN  --   --   --  2.9*   PROTTOTAL  --   --   --   6.3*   BILITOTAL  --   --   --  0.2   ALKPHOS  --   --   --  80   ALT  --   --   --  28   AST  --   --   --  14       No results found for this or any previous visit (from the past 24 hour(s)).

## 2022-01-12 NOTE — PLAN OF CARE
End of Shift Summary  For vital signs and complete assessments, please see documentation flowsheets.     Pertinent assessments: Pt alert, non verbal. On RA, no signs of pain. LS diminished. Incont of B&B. Sally diet.     Major Shift Events: home meds restarted.     Treatment Plan: Unasyn, Remdesivir, Decadron, Lovenox.

## 2022-01-13 VITALS
RESPIRATION RATE: 18 BRPM | TEMPERATURE: 98.4 F | HEIGHT: 60 IN | SYSTOLIC BLOOD PRESSURE: 118 MMHG | DIASTOLIC BLOOD PRESSURE: 78 MMHG | HEART RATE: 53 BPM | BODY MASS INDEX: 19.52 KG/M2 | WEIGHT: 99.43 LBS | OXYGEN SATURATION: 97 %

## 2022-01-13 LAB
ALBUMIN SERPL-MCNC: 3 G/DL (ref 3.4–5)
ALP SERPL-CCNC: 70 U/L (ref 40–150)
ALT SERPL W P-5'-P-CCNC: 36 U/L (ref 0–70)
ANION GAP SERPL CALCULATED.3IONS-SCNC: 2 MMOL/L (ref 3–14)
AST SERPL W P-5'-P-CCNC: 18 U/L (ref 0–45)
BILIRUB DIRECT SERPL-MCNC: <0.1 MG/DL (ref 0–0.2)
BILIRUB SERPL-MCNC: 0.4 MG/DL (ref 0.2–1.3)
BUN SERPL-MCNC: 37 MG/DL (ref 7–30)
CALCIUM SERPL-MCNC: 8.5 MG/DL (ref 8.5–10.1)
CHLORIDE BLD-SCNC: 116 MMOL/L (ref 94–109)
CO2 SERPL-SCNC: 28 MMOL/L (ref 20–32)
CREAT SERPL-MCNC: 0.6 MG/DL (ref 0.66–1.25)
CRP SERPL-MCNC: 6.1 MG/L (ref 0–8)
D DIMER PPP FEU-MCNC: 0.55 UG/ML FEU (ref 0–0.5)
ERYTHROCYTE [DISTWIDTH] IN BLOOD BY AUTOMATED COUNT: 12.1 % (ref 10–15)
FIBRINOGEN PPP-MCNC: 346 MG/DL (ref 170–490)
GFR SERPL CREATININE-BSD FRML MDRD: >90 ML/MIN/1.73M2
GLUCOSE BLD-MCNC: 99 MG/DL (ref 70–99)
HCT VFR BLD AUTO: 39.2 % (ref 40–53)
HGB BLD-MCNC: 13 G/DL (ref 13.3–17.7)
MCH RBC QN AUTO: 32.3 PG (ref 26.5–33)
MCHC RBC AUTO-ENTMCNC: 33.2 G/DL (ref 31.5–36.5)
MCV RBC AUTO: 98 FL (ref 78–100)
PLATELET # BLD AUTO: 135 10E3/UL (ref 150–450)
POTASSIUM BLD-SCNC: 3.9 MMOL/L (ref 3.4–5.3)
PROT SERPL-MCNC: 6.5 G/DL (ref 6.8–8.8)
RBC # BLD AUTO: 4.02 10E6/UL (ref 4.4–5.9)
SODIUM SERPL-SCNC: 146 MMOL/L (ref 133–144)
WBC # BLD AUTO: 4.9 10E3/UL (ref 4–11)

## 2022-01-13 PROCEDURE — 85384 FIBRINOGEN ACTIVITY: CPT | Performed by: HOSPITALIST

## 2022-01-13 PROCEDURE — 250N000011 HC RX IP 250 OP 636: Performed by: HOSPITALIST

## 2022-01-13 PROCEDURE — 250N000013 HC RX MED GY IP 250 OP 250 PS 637: Performed by: HOSPITALIST

## 2022-01-13 PROCEDURE — 85027 COMPLETE CBC AUTOMATED: CPT | Performed by: HOSPITALIST

## 2022-01-13 PROCEDURE — 250N000013 HC RX MED GY IP 250 OP 250 PS 637: Performed by: STUDENT IN AN ORGANIZED HEALTH CARE EDUCATION/TRAINING PROGRAM

## 2022-01-13 PROCEDURE — 86140 C-REACTIVE PROTEIN: CPT | Performed by: HOSPITALIST

## 2022-01-13 PROCEDURE — 36415 COLL VENOUS BLD VENIPUNCTURE: CPT | Performed by: HOSPITALIST

## 2022-01-13 PROCEDURE — 82248 BILIRUBIN DIRECT: CPT | Performed by: HOSPITALIST

## 2022-01-13 PROCEDURE — 80053 COMPREHEN METABOLIC PANEL: CPT | Performed by: HOSPITALIST

## 2022-01-13 PROCEDURE — 85379 FIBRIN DEGRADATION QUANT: CPT | Performed by: HOSPITALIST

## 2022-01-13 PROCEDURE — 99239 HOSP IP/OBS DSCHRG MGMT >30: CPT | Performed by: INTERNAL MEDICINE

## 2022-01-13 RX ADMIN — OLOPATADINE HYDROCHLORIDE 1 DROP: 1 SOLUTION OPHTHALMIC at 09:01

## 2022-01-13 RX ADMIN — OLOPATADINE HYDROCHLORIDE 1 DROP: 1 SOLUTION OPHTHALMIC at 01:03

## 2022-01-13 RX ADMIN — PANTOPRAZOLE SODIUM 20 MG: 20 TABLET, DELAYED RELEASE ORAL at 08:53

## 2022-01-13 RX ADMIN — POLYETHYLENE GLYCOL 3350 17 G: 17 POWDER, FOR SOLUTION ORAL at 08:47

## 2022-01-13 RX ADMIN — TIZANIDINE 4 MG: 4 TABLET ORAL at 08:52

## 2022-01-13 RX ADMIN — ENOXAPARIN SODIUM 40 MG: 40 INJECTION SUBCUTANEOUS at 05:37

## 2022-01-13 RX ADMIN — MAGNESIUM HYDROXIDE 30 ML: 400 SUSPENSION ORAL at 08:50

## 2022-01-13 RX ADMIN — DICLOFENAC SODIUM 1 G: 10 GEL TOPICAL at 09:02

## 2022-01-13 RX ADMIN — AMOXICILLIN AND CLAVULANATE POTASSIUM 1 TABLET: 875; 125 TABLET, FILM COATED ORAL at 08:51

## 2022-01-13 RX ADMIN — CITALOPRAM HYDROBROMIDE 40 MG: 20 TABLET ORAL at 08:53

## 2022-01-13 RX ADMIN — LACTULOSE 20 G: 20 SOLUTION ORAL at 08:49

## 2022-01-13 ASSESSMENT — ACTIVITIES OF DAILY LIVING (ADL)
ADLS_ACUITY_SCORE: 40

## 2022-01-13 NOTE — PROGRESS NOTES
Pt to D/C to [facility].  Pt provided with d/c instructions, including new medications, when medications were last given, and when to take them again.  Copy of paperwork sent with pt.  Medication (augmentin and eliquis) sent with pt.  [facility] stretcher to provide transport.  All personal belongings sent with pt.

## 2022-01-13 NOTE — PROGRESS NOTES
Care Management Discharge Note    Discharge Date: 01/13/2022       Discharge Disposition: Group Home    Discharge Services: None    Discharge DME: None    Discharge Transportation: agency  Due to COVID status, Non Verbal CP will require stretcher transport     Private pay costs discussed: Not applicable   Has MA       Patient/family educated on Medicare website which has current facility and service quality ratings: no    Education Provided on the Discharge Plan:  Yes GH and Guardian updated   Persons Notified of Discharge Plans: yes   Patient/Family in Agreement with the Plan: unable to assess    Handoff Referral Completed: Yes    Additional Information:     01/12/22 1100   Final Resources   [facility] Agency [facility]     Group [place] Contact Info [place] [phone]   Stretcher transportation set up for 1100        Corinne C. White, LSW

## 2022-01-13 NOTE — PROGRESS NOTES
Pulled IV. Will switch unasyn to augmentin given lack of IV access and possible discharge tomorrow.     Axel Biggs, DO

## 2022-01-13 NOTE — DISCHARGE SUMMARY
Physician Discharge Summary           [place] Hospitalist Discharge Summary-[facility]    Name: Benito Marx    MRN: [id number]     YOB: 1965    Age: 56 year old                                                     Primary care provider: Pily Blackmon    Admit date:  1/10/2022    Discharge date and time: 1/13/2022 11:05 AM    Discharge Physician: Thad Avalos M.D., M.B.A.       Primary Discharge Diagnosis      Acute hypoxemic respiratory failure secondary to COVID-19   Possible Aspiration pneumonia      Secondary Diagnosis /chronic medical conditions     Past Medical History:   Diagnosis Date     Adjustment disorder with depressed mood      Cerebral palsy (H)      Constipated      GERD (gastroesophageal reflux disease)      History of aspiration pneumonia      Mental retardation, moderate (I.Q. 35-49)     prenatal hypoxia     Seizures (H)     LAST Sz 1994, No AEM as of 10.15.18     Past Surgical History:  Past Surgical History:   Procedure Laterality Date     CHOLECYSTECTOMY             Brief Summary of Hospital stay :       Please refer to  Admission H&P note for full details of patient presentation.    Reason for Hospitalization(C/C,HPI and brief patient summary):vomiting associated fevers      Significant findings(Primary diagnosis )Procedures and treatments provided(Hospital course ,consults, procedures):Please see below for details  Benito Marx is a 56 year old male was brought to the ER by ambulance from longterm for hypoxia.  Patient was seen in the ER earlier that is last night for possible aspiration as patient had vomiting associated fevers.  Patient lives in group home and has history of cerebral palsy with quadriplegia.       #Acute hypoxemic respiratory failure secondary to COVID-19 vs. Aspiration pneumonia: Patient is nonverbal with quadriplegia.  He has a history of recurrent aspiration pneumonias.  He was found to be COVID-19 positive on 1/9.  His ER  work-up showed that he was afebrile and hemodynamically stable.  Needing 2 L oxygen via nasal cannula to maintain saturations.  Chest x-ray shows clear lungs without any focal consolidation.  -Patient is currently afebrile and hemodynamically stable.  Saturating well on room air.  - Treated with Unasyn  for possible aspiration.  - Given hypoxemia on presentation, dexamethasone and remdesivir  Started .  -Lovenox for prophylaxis and plan to start Eliqies on discharge for COVID risk      #Cerebral palsy, depression/anxiety, intellectual disability: At baseline, pt reportedly is in bed or wheelchair.  He communicates via picture board at his group home.  Pt is chronically on olanzapine 2.5 mg at night, citalopram 40 mg daily, tizanidine 4 mg TID.  He uses ativan prn.  These meds have been resumed.        #GERD: Continue PPI  #Dysphagia: Seen by speech. Modified diet restarted and tolerating feeds with nursing.            Consultations during hospital stay:       SPEECH LANGUAGE PATH ADULT IP CONSULT  SPEECH LANGUAGE PATH ADULT IP CONSULT  CARE MANAGEMENT / SOCIAL WORK IP CONSULT      Patient discharge Condition:     stable    /78 (BP Location: Right arm)   Pulse 53   Temp 98.4  F (36.9  C) (Oral)   Resp 18   Ht 1.524 m (5')   Wt 45.1 kg (99 lb 6.8 oz)   SpO2 97%   BMI 19.42 kg/m         Discharge Instructions:       Patient/family instructions: Written discharge instruction given to patient/family    Discharge Medications:       Review of your medicines      START taking      Dose / Directions   amoxicillin-clavulanate 875-125 MG tablet  Commonly known as: AUGMENTIN  Indication: Pneumonia caused by Inhaling a Substance Into the Lungs  Used for: Aspiration pneumonia due to vomit, unspecified laterality, unspecified part of lung (H)      Dose: 1 tablet  Take 1 tablet by mouth every 12 hours for 7 days  Quantity: 14 tablet  Refills: 0     apixaban ANTICOAGULANT 2.5 MG tablet  Commonly known as: ELIQUIS  Used  for: Infection due to 2019 novel coronavirus      Dose: 2.5 mg  Take 1 tablet (2.5 mg) by mouth 2 times daily  Quantity: 60 tablet  Refills: 0        CONTINUE these medicines which may have CHANGED, or have new prescriptions. If we are uncertain of the size of tablets/capsules you have at home, strength may be listed as something that might have changed.      Dose / Directions   acetaminophen 325 MG tablet  Commonly known as: TYLENOL  This may have changed: Another medication with the same name was removed. Continue taking this medication, and follow the directions you see here.      Dose: 650 mg  Take 650 mg by mouth every 6 hours as needed for mild pain (up to 6 tablets daily)  Refills: 0     LORazepam 0.5 MG tablet  Commonly known as: ATIVAN  This may have changed: Another medication with the same name was removed. Continue taking this medication, and follow the directions you see here.      Dose: 0.5 mg  Take 0.5 mg by mouth 2 times daily as needed for agitation or anxiety  Refills: 0        CONTINUE these medicines which have NOT CHANGED      Dose / Directions   albuterol (2.5 MG/3ML) 0.083% neb solution  Commonly known as: PROVENTIL      Dose: 2.5 mg  Take 2.5 mg by nebulization 2 times daily  Refills: 0     cetirizine 10 MG tablet  Commonly known as: zyrTEC      Dose: 10 mg  Take 10 mg by mouth daily  Refills: 0     cholecalciferol 25 MCG (1000 UT) Tabs      Dose: 1,000 Units  Take 1,000 Units by mouth daily  Refills: 0     citalopram 40 MG tablet  Commonly known as: celeXA      Dose: 40 mg  Take 40 mg by mouth daily  Refills: 0     diclofenac 1 % topical gel  Commonly known as: VOLTAREN      Dose: 1 g  Apply 1 g topically 2 times daily . To each knee  Refills: 0     fluticasone 50 MCG/ACT nasal spray  Commonly known as: FLONASE      Dose: 2 spray  Spray 2 sprays into both nostrils daily  Refills: 0     Kristalose 20 GM packet  Generic drug: lactulose      Dose: 20 g  Take 20 g by mouth 2 times  daily  Refills: 0     magnesium hydroxide 400 MG/5ML suspension  Commonly known as: MILK OF MAGNESIA      Dose: 30 mL  Take 30 mLs by mouth daily  Refills: 0     montelukast 10 MG tablet  Commonly known as: SINGULAIR      Dose: 10 mg  Take 10 mg by mouth At Bedtime  Refills: 0     OLANZapine 2.5 MG tablet  Commonly known as: zyPREXA      Dose: 2.5 mg  Take 2.5 mg by mouth At Bedtime  Refills: 0     olopatadine 0.1 % ophthalmic solution  Commonly known as: PATANOL      Dose: 1 drop  Place 1 drop Into the left eye 2 times daily  Refills: 0     pantoprazole 20 MG EC tablet  Commonly known as: PROTONIX      Dose: 20 mg  Take 20 mg by mouth 2 times daily  Refills: 0     senna 8.6 MG tablet  Commonly known as: SENOKOT      Dose: 2 tablet  Take 2 tablets by mouth 2 times daily  Refills: 0     tiZANidine 4 MG tablet  Commonly known as: ZANAFLEX      Dose: 4 mg  Take 4 mg by mouth 3 times daily  Refills: 0           Where to get your medicines      These medications were sent to [facility] [address] [address]    Phone: [phone]     amoxicillin-clavulanate 875-125 MG tablet    apixaban ANTICOAGULANT 2.5 MG tablet          Discharge diet:Orders Placed This Encounter      Diet    regular diet      Discharge activity:Activity as tolerated      Discharge follow-up:    Follow up with primary care provider in 7 days or earlier if symptoms return or gets worse.        Other instructions:    We discussed with patient/family about detail discharge instructions as well as discharge medications above including potential risks,side effects and benefits.Patient/family understood benefits and potential serious side effects of taking these medications and need to follow up with PCP if the patient develops complications.  Patient is also advised to see a doctor immediately for severe symptoms.        Major procedure performed/  Significant Diagnostic  Studies:         Results for orders placed or performed during the hospital encounter of 01/10/22   XR Chest Port 1 View    Narrative    EXAM: XR CHEST PORT 1 VIEW  LOCATION: [facility]  DATE/TIME: 1/10/2022 1:19 AM    INDICATION: Fever  COMPARISON: 01/09/2022.      Impression    IMPRESSION: Lungs appear largely clear. No focal consolidation reliably detected. No significant pleural fluid. No pneumothorax. Normal heart size and pulmonary vascularity. Mild to moderate convex right thoracic scoliosis.       Recent Labs   Lab 01/13/22  0840 01/12/22  0709 01/11/22  0843   WBC 4.9 4.7 3.8*   HGB 13.0* 13.0* 13.3   HCT 39.2* 39.5* 40.9   MCV 98 97 99   * 134* 131*     No results for input(s): CULT in the last 168 hours.  Recent Labs   Lab 01/13/22  0840 01/12/22  0709 01/11/22  0843 01/10/22  0836 01/10/22  0117   * 143 144  --  143   POTASSIUM 3.9 3.9 4.0  --  3.8   CHLORIDE 116* 113* 112*  --  108   CO2 28 30 29  --  33*   ANIONGAP 2* <1* 3  --  2*   GLC 99 103* 90  --  120*   BUN 37* 32* 28  --  21   CR 0.60* 0.63* 0.70   < > 0.68   GFRESTIMATED >90 >90 >90   < > >90   SATRID 8.5 8.3* 8.2*  --  7.9*   PROTTOTAL 6.5*  --   --   --  6.3*   ALBUMIN 3.0*  --   --   --  2.9*   BILITOTAL 0.4  --   --   --  0.2   ALKPHOS 70  --   --   --  80   AST 18  --   --   --  14   ALT 36  --   --   --  28    < > = values in this interval not displayed.       Recent Labs   Lab 01/13/22  0840 01/12/22  0709 01/11/22  0843 01/10/22  0117   GLC 99 103* 90 120*       Recent Labs   Lab 01/10/22  0836   INR 1.01           Pending Results:       Unresulted Labs Ordered in the Past 30 Days of this Admission     Date and Time Order Name Status Description    1/10/2022  1:41 AM Blood Culture Arm, Right Preliminary     1/10/2022  1:12 AM Blood Culture Peripheral Blood Preliminary              Patient Allergies:       Allergies   Allergen Reactions     Nutritional Supplements Nausea and Vomiting     increased  vomiting when taking this.     Seasonal Allergies          Disposition:     Disposition: home      I saw and evaluated the patient on day of discharge and  discharge instructions reviewed  and  all the patient's questions and concerns addressed. Over 30 minutes spent on discharge and coordination of discharge process for this patient.      Disclaimer: This note consists of symbols derived from keyboarding, dictation and/or voice recognition software. As a result, there may be errors in the script that have gone undetected. Please consider this when interpreting information found in this chart

## 2022-01-13 NOTE — PROGRESS NOTES
End of Shift Summary  For vital signs and complete assessments, please see documentation flowsheets.     Pertinent assessments: Pt alert, non verbal. On RA, no signs of pain. LS diminished. Incont of B&B.     Major Shift Events: Pt had 2 large emesis this shift, held HS medications, Unasyn changed to oral Augmentin due to pt removing PIV     Treatment Plan: Augmentin, Remdesivir, Decadron, Lovenox, possible discharge home today     Bedside Nurse: Katie Monteiro RN

## 2022-01-14 NOTE — PLAN OF CARE
Speech Language Therapy Discharge Summary    Reason for therapy discharge:    Discharged to home.    Progress towards therapy goal(s). See goals on Care Plan in [facility] electronic health record for goal details.  Goals partially met.  Barriers to achieving goals:   discharge from facility.    Therapy recommendation(s):    No further therapy is recommended. Suspect baseline level of dysphagia, discharged on baseline diet of puree solids, extremely thick liquids.

## 2022-01-15 LAB
BACTERIA BLD CULT: NO GROWTH
BACTERIA BLD CULT: NO GROWTH

## 2022-07-05 PROBLEM — R11.10 INTRACTABLE VOMITING, PRESENCE OF NAUSEA NOT SPECIFIED, UNSPECIFIED VOMITING TYPE: Status: ACTIVE | Noted: 2022-01-01

## 2022-07-05 PROBLEM — K56.41 FECAL IMPACTION (H): Status: ACTIVE | Noted: 2022-01-01

## 2022-07-05 NOTE — ED TRIAGE NOTES
Pt presents via EMS w/ complaints of vomiting x2 today. Pt comes from group home. BS 86. Pt non-verbal. ABCs intact.      Triage Assessment     Row Name 07/05/22 6719       Triage Assessment (Adult)    Airway WDL WDL       Respiratory WDL    Respiratory WDL WDL       Cardiac WDL    Cardiac WDL WDL       Peripheral/Neurovascular WDL    Peripheral Neurovascular WDL WDL       Cognitive/Neuro/Behavioral WDL    Cognitive/Neuro/Behavioral WDL WDL

## 2022-07-05 NOTE — ED NOTES
Writer attempts to place IV. Pt starts gagging. After many gags, pt vomits dark brown vomit. Pt does not tolerate vomit bag near face, causing pt to vomit on clothes and chux. Clothes and chux changed. Clothes placed in belongings bag. Will continue to monitor.

## 2022-07-05 NOTE — ED NOTES
Rebecca RN [phone] from pt's group home [facility] group home called to give update on pt. Per Rebecca staff is worried that pt possibly aspirated on something. Staff stated to Rebecca that pt was having difficult time breathing and throwing up much more than normal. Pt has hx of bowel obstructions, Rebecca stated pt last BM was yesterday. Pt is nonverbal, Rebecca states it works best to do the 1,2 finger method w/ pt & pt can spell.     For discharge call Ida () - [phone]

## 2022-07-06 NOTE — PHARMACY-ADMISSION MEDICATION HISTORY
Admission medication history interview status for this patient is complete. See [facility] admission navigator for allergy information, prior to admission medications and immunization status.     Medication history interview done, indicate source(s): Caregiver from group home  Medication history resources (including written lists, pill bottles, clinic record):None    Changes made to PTA medication list:  Added: debrox, dentagel  Changed: none  Reported as Not Taking: none  Removed: apixaban, acetaminophen    Actions taken by pharmacist (provider contacted, etc):None     Additional medication history information:None    Medication reconciliation/reorder completed by provider prior to medication history?  N   (Y/N)     Prior to Admission medications    Medication Sig Last Dose Taking? Auth Provider Long Term End Date   albuterol (PROVENTIL) (2.5 MG/3ML) 0.083% neb solution Take 2.5 mg by nebulization 2 times daily  7/5/2022 at Unknown time Yes Reported, Patient Yes    carbamide peroxide (DEBROX) 6.5 % otic solution Place 5-10 drops into both ears every 30 days 7/5/2022 at Unknown time Yes Unknown, Entered By History     cetirizine (ZYRTEC) 10 MG tablet Take 10 mg by mouth daily 7/5/2022 at Unknown time Yes Reported, Patient     cholecalciferol 25 MCG (1000 UT) TABS Take 1,000 Units by mouth daily 7/5/2022 at Unknown time Yes Reported, Patient     citalopram (CELEXA) 40 MG tablet Take 40 mg by mouth daily 7/5/2022 at Unknown time Yes Reported, Patient Yes    diclofenac (VOLTAREN) 1 % topical gel Apply 1 g topically 2 times daily . To each knee 7/5/2022 at Unknown time Yes Unknown, Entered By History     fluticasone (FLONASE) 50 MCG/ACT nasal spray Spray 2 sprays into both nostrils daily 7/5/2022 at Unknown time Yes Reported, Patient     KRISTALOSE 20 g packet Take 20 g by mouth 2 times daily  7/5/2022 at Unknown time Yes Reported, Patient     LORazepam (ATIVAN) 0.5 MG tablet Take 0.5 mg by mouth 2 times daily as needed for  agitation or anxiety  Yes Unknown, Entered By History     magnesium hydroxide (MILK OF MAGNESIA) 400 MG/5ML suspension Take 30 mLs by mouth daily 7/5/2022 at Unknown time Yes Reported, Patient     montelukast (SINGULAIR) 10 MG tablet Take 10 mg by mouth At Bedtime 7/4/2022 at Unknown time Yes Unknown, Entered By History Yes    OLANZapine (ZYPREXA) 2.5 MG tablet Take 2.5 mg by mouth At Bedtime 7/4/2022 at Unknown time Yes Unknown, Entered By History Yes    olopatadine (PATANOL) 0.1 % ophthalmic solution Place 1 drop Into the left eye 2 times daily 7/5/2022 at Unknown time Yes Unknown, Entered By History     pantoprazole (PROTONIX) 20 MG EC tablet Take 20 mg by mouth 2 times daily 7/5/2022 at am Yes Reported, Patient     senna (SENOKOT) 8.6 MG tablet Take 2 tablets by mouth 2 times daily  7/5/2022 at am Yes Reported, Patient     sodium fluoride dental gel (PREVIDENT) 1.1 % GEL topical gel Apply to affected area At Bedtime 7/4/2022 at Unknown time Yes Unknown, Entered By History     tiZANidine (ZANAFLEX) 4 MG tablet Take 4 mg by mouth 3 times daily 7/5/2022 at 2pm Yes Reported, Patient

## 2022-07-06 NOTE — ED PROVIDER NOTES
History     Chief Complaint:  Vomiting      HPI   Benito Marx is a 56 year old male who presents with vomiting that started today.  He apparently has had 2 episodes at his group home and another episode here in the ED.  He has history of self-induced vomiting in the past.  He also has history of bowel obstructions and aspiration pneumonia.  He is unable to give any further history due to his nonverbal status.    Review of Systems   Unable to perform ROS: Patient nonverbal   Gastrointestinal: Positive for vomiting.         Allergies:  Nutritional Supplements  Seasonal Allergies    Medications:    No current outpatient medications on file.       Past Medical History:   Past Surgical History:     Past Medical History:   Diagnosis Date     Adjustment disorder with depressed mood      Cerebral palsy (H)      Constipated      GERD (gastroesophageal reflux disease)      History of aspiration pneumonia      Mental retardation, moderate (I.Q. 35-49)      Seizures (H)     Past Surgical History:   Procedure Laterality Date     CHOLECYSTECTOMY        Patient Active Problem List    Diagnosis Date Noted     Fecal impaction (H) 07/05/2022     Priority: Medium     Intractable vomiting, presence of nausea not specified, unspecified vomiting type 07/05/2022     Priority: Medium     Acute respiratory failure with hypoxia (H) 01/10/2022     Priority: Medium     Cerebral palsy, unspecified type (H) 01/10/2022     Priority: Medium     Infection due to 2019 novel coronavirus 01/10/2022     Priority: Medium     Aspiration pneumonia (H) 01/10/2022     Priority: Medium     Meconium aspiration pneumonia, unspecified laterality, unspecified part of lung 01/10/2022     Priority: Medium     Quadriplegic cerebral palsy (H) 01/09/2022     Priority: Medium     Gastroesophageal reflux disease 01/09/2022     Priority: Medium     Hiatal hernia 01/09/2022     Priority: Medium     Seizure (H) 01/09/2022     Priority: Medium     Moderate  intellectual disability 01/09/2022     Priority: Medium     Oropharyngeal dysphagia 11/03/2017     Priority: Medium     Constipation 09/04/2013     Priority: Medium     Formatting of this note might be different from the original.  Severe on CT 8/2013       Urinary incontinence 09/17/2012     Priority: Medium     Episodic mood disorder (H) 10/12/2009     Priority: Medium     Adjustment disorder with depressed mood 06/23/2009     Priority: Medium     Formatting of this note might be different from the original.  Rule out Depression NOS       Congenital hemiplegia (H) 07/26/2007     Priority: Medium     Esophageal reflux 07/26/2007     Priority: Medium          Family History  Family History   Problem Relation Age of Onset     No Known Problems Mother      No Known Problems Father      No Known Problems Maternal Grandmother      No Known Problems Maternal Grandfather      No Known Problems Paternal Grandmother      No Known Problems Paternal Grandfather      No Known Problems Brother        Social History:  PCP: Pily Blackmon  Presents to the ED alone by EMS    Physical Exam     Patient Vitals for the past 24 hrs:   BP Temp Temp src Pulse Resp SpO2   07/06/22 0046 (!) 184/137 99.5  F (37.5  C) Temporal 96 -- 98 %   07/06/22 0000 114/76 -- -- 106 -- --   07/05/22 2330 -- -- -- -- -- 95 %   07/05/22 2231 110/88 -- -- 83 -- --   07/05/22 2230 110/88 -- -- 83 -- --   07/05/22 2223 -- -- -- -- -- 95 %   07/05/22 2000 (!) 150/78 -- -- 88 -- --   07/05/22 1930 (!) 152/96 -- -- 94 -- --   07/05/22 1843 -- -- -- -- -- 95 %   07/05/22 1842 -- -- -- -- -- 94 %   07/05/22 1830 (!) 116/99 -- -- 77 -- 94 %   07/05/22 1823 -- -- -- 80 -- 96 %   07/05/22 1800 -- -- -- -- -- 97 %   07/05/22 1751 -- 99.8  F (37.7  C) Temporal -- -- --   07/05/22 1749 (!) 120/105 -- -- 79 17 95 %       Physical Exam  Vitals and nursing note reviewed.   Constitutional:       General: He is not in acute distress.     Appearance: He is ill-appearing.       Comments: Developmentally delayed, appearance consistent with cerebral palsy.   HENT:      Head: Normocephalic and atraumatic.      Right Ear: External ear normal.      Left Ear: External ear normal.      Nose: Nose normal.   Eyes:      Conjunctiva/sclera: Conjunctivae normal.   Cardiovascular:      Rate and Rhythm: Normal rate and regular rhythm.      Heart sounds: No murmur heard.  Pulmonary:      Effort: Pulmonary effort is normal. No respiratory distress.      Breath sounds: Rhonchi (Coarse breath sounds bilaterally) present. No wheezing or rales.   Abdominal:      General: Abdomen is flat. Bowel sounds are normal. There is no distension.      Palpations: Abdomen is soft.      Tenderness: There is no abdominal tenderness. There is no guarding or rebound.   Genitourinary:     Comments: Minimal soft brown stool palpated in the rectal vault, no significant stool impaction noted in the rectal vault.  Exam chaperoned by LG Ignacio  Musculoskeletal:         General: No swelling or deformity.      Cervical back: Normal range of motion and neck supple.   Skin:     General: Skin is warm and dry.      Findings: No rash.   Neurological:      Mental Status: He is alert.      Comments: Nonverbal           Emergency Department Course         Imaging:  XR Abdomen Port 1 View   Final Result   IMPRESSION: The NG tube is been retracted and the side-port is now the level of the GE junction, this should be repositioned and advanced approximately 5 to 10 cm for optimal positioning. Multiple air-filled loops of small and large bowel are again seen    throughout the abdomen, unchanged.       XR Abdomen Port 1 View   Final Result   IMPRESSION: An NG tube is seen coiled in the expected position of the proximal stomach. Multiple air-filled loops of small and large bowel are again seen throughout the abdomen, similar to prior exam       CT Abdomen Pelvis w Contrast   Final Result   IMPRESSION:    1.  Large amount of stool fills and distends  the rectum likely contributes to symptoms. Remainder of bowel within normal limits, no inflammatory bowel wall thickening.   2.  Bladder anteriorly displaced and flattened due to mass effect from the filled rectum.      XR Chest Port 1 View   Final Result   IMPRESSION: Minimal change. Lung volumes are low but lungs appear grossly clear. Heart size and pulmonary vessels normal. Abundant intestinal gas persists.          Laboratory:  Labs Ordered and Resulted from Time of ED Arrival to Time of ED Departure   BASIC METABOLIC PANEL - Abnormal       Result Value    Sodium 143      Potassium 4.0      Chloride 106      Carbon Dioxide (CO2) 29      Anion Gap 8      Urea Nitrogen 17      Creatinine 0.72      Calcium 9.3      Glucose 103 (*)     GFR Estimate >90     CBC WITH PLATELETS AND DIFFERENTIAL - Abnormal    WBC Count 7.0      RBC Count 4.47      Hemoglobin 11.5 (*)     Hematocrit 38.0 (*)     MCV 85      MCH 25.7 (*)     MCHC 30.3 (*)     RDW 14.6      Platelet Count 188      % Neutrophils 60      % Lymphocytes 27      % Monocytes 10      % Eosinophils 3      % Basophils 0      % Immature Granulocytes 0      NRBCs per 100 WBC 0      Absolute Neutrophils 4.2      Absolute Lymphocytes 1.9      Absolute Monocytes 0.7      Absolute Eosinophils 0.2      Absolute Basophils 0.0      Absolute Immature Granulocytes 0.0      Absolute NRBCs 0.0     ROUTINE UA WITH MICROSCOPIC REFLEX TO CULTURE - Abnormal    Color Urine Yellow      Appearance Urine Cloudy (*)     Glucose Urine Negative      Bilirubin Urine Negative      Ketones Urine 20  (*)     Specific Gravity Urine 1.029      Blood Urine Negative      pH Urine 8.0 (*)     Protein Albumin Urine 30  (*)     Urobilinogen Urine Normal      Nitrite Urine Negative      Leukocyte Esterase Urine Negative      Mucus Urine Present (*)     Amorphous Crystals Urine Few (*)     RBC Urine 6 (*)     WBC Urine 5      Squamous Epithelials Urine 1     HEPATIC FUNCTION PANEL - Abnormal     Bilirubin Total 0.3      Bilirubin Direct <0.1      Protein Total 7.6      Albumin 3.7      Alkaline Phosphatase 108      AST 95 (*)     ALT 82 (*)    LIPASE - Normal    Lipase 187             Emergency Department Course:           Reviewed:  I reviewed nursing notes, vitals, past history and care everywhere      Interventions:  Medications   acetaminophen (TYLENOL) tablet 650 mg (has no administration in time range)     Or   acetaminophen (TYLENOL) Suppository 650 mg (has no administration in time range)   melatonin tablet 1 mg (has no administration in time range)   senna-docusate (SENOKOT-S/PERICOLACE) 8.6-50 MG per tablet 1 tablet (has no administration in time range)     Or   senna-docusate (SENOKOT-S/PERICOLACE) 8.6-50 MG per tablet 2 tablet (has no administration in time range)   magnesium hydroxide (MILK OF MAGNESIA) suspension 30 mL (has no administration in time range)   ondansetron (ZOFRAN ODT) ODT tab 4 mg (has no administration in time range)     Or   ondansetron (ZOFRAN) injection 4 mg (has no administration in time range)   prochlorperazine (COMPAZINE) injection 10 mg (has no administration in time range)     Or   prochlorperazine (COMPAZINE) tablet 10 mg (has no administration in time range)     Or   prochlorperazine (COMPAZINE) suppository 25 mg (has no administration in time range)   LORazepam (ATIVAN) injection 0.5 mg (0.5 mg Intravenous Given 7/6/22 0201)   scopolamine (TRANSDERM) 72 hr patch 1 patch (1 patch Transdermal Patch/Med Applied 7/6/22 0131)     And   scopolamine (TRANSDERM-SCOP) Patch in Place ( Transdermal Patch in Place 7/6/22 0143)   pantoprazole (PROTONIX) IV push injection 40 mg (40 mg Intravenous Given 7/6/22 0131)   OLANZapine (zyPREXA) injection 2.5 mg (has no administration in time range)   OLANZapine (zyPREXA) tablet 2.5 mg (2.5 mg Oral Given 7/6/22 0131)   tiZANidine (ZANAFLEX) tablet 4 mg (has no administration in time range)   0.9% sodium chloride BOLUS (0 mLs Intravenous  Stopped 22)   CT SCAN FLUSH (52 mLs Intravenous Given 22)   iopamidol (ISOVUE-370) solution 500 mL (45 mLs Intravenous Given 22)   Enema Compound (docusate/mag cit/mineral oil/NaPhos) SIMPLE (286 mLs Rectal Given 22)       Disposition:  The patient was admitted to the hospital under the care of Dr. Kim.    Impression & Plan      CMS Diagnoses: None       Medical Decision Makin-year-old male presenting with vomiting today.  He is unable to give any history and his exam is limited due to his history of nonverbal status and cerebral palsy.  He has history of bowel obstruction in the past, so this is certainly a concern.  Otherwise could be viral illness, infection, sepsis, pneumonia, aspiration, pancreatitis, biliary colic, appendicitis, peptic ulcer.  Could also be self-induced vomiting as apparently patient has history of this.  Will check labs, chest x-ray, CTA of the abdomen and pelvis for further evaluation.  Will treat with IV fluids and antiemetics.     patient's labs and imaging are most unremarkable.  He does however have large amount of stool throughout the colon.  This may be causing a mechanical obstruction to some degree.  Attempted manual disimpaction in the ED, however there was little to no stool palpated in the rectal vault.  We attempted a enema, however did not get much results.  Patient started to vomit again and continued to vomit.  He is high risk for aspiration, so we will plan to admit for observation.  Attempted to place NG tube however patient did not tolerate this.  Approximately 300 mL of gastric contents were able to be aspirated prior to removal.  D/w Dr Kim who accepts admission.         Covid-19  Benito Marx was evaluated during a global COVID-19 pandemic, which necessitated consideration that the patient might be at risk for infection with the SARS-CoV-2 virus that causes COVID-19.  Applicable protocols for evaluation were followed  during the patient's care. COVID-19 was considered as part of the patient's evaluation.       Diagnosis:    ICD-10-CM    1. Intractable vomiting, presence of nausea not specified, unspecified vomiting type  R11.10    2. Fecal impaction (H)  K56.41        Discharge Medications:  Current Discharge Medication List                 Nii Benjamin MD  07/06/22 0251

## 2022-07-06 NOTE — ED NOTES
[facility]  ED Nurse Handoff Report    Benito Marx is a 56 year old male   ED Chief complaint: Vomiting  . ED Diagnosis:   Final diagnoses:   Intractable vomiting, presence of nausea not specified, unspecified vomiting type   Fecal impaction (H)     Allergies:   Allergies   Allergen Reactions     Nutritional Supplements Nausea and Vomiting     increased vomiting when taking this.     Seasonal Allergies        Code Status: Full Code  Activity level - Baseline/Home:  Assist X 2. Activity Level - Current:   Assist X 2. Lift room needed: No. Bariatric: No   Needed: No   Isolation: No. Infection: Not Applicable.     Vital Signs:   Vitals:    07/05/22 2000 07/05/22 2223 07/05/22 2230 07/05/22 2231   BP: (!) 150/78  110/88 110/88   Pulse: 88  83 83   Resp:       Temp:       TempSrc:       SpO2:  95%         Cardiac Rhythm:  ,      Pain level:    Patient confused: .. Patient Falls Risk: Yes.   Elimination Status: Has voided   Patient Report / Focused Assessment:    Neuro Cognitive (Adult) - Cognitive/Neuro/Behavioral WDL: .WDL except; speech  Level of Consciousness: confused  Speech:  (nonverbal)  Stroke Exam Continued - Level of Consciousness: confused  Speech:  (nonverbal)  Cognitive - Communication / Language: expressive disorder   Respiratory WDL - Respiratory WDL: .WDL except; rhythm/pattern  Rhythm/Pattern, Respiratory: pattern regular; unlabored   Breath Sounds - Breath Sounds: All Fields  All Lung Fields Breath Sounds: coarse   gastrointestinal - Gastrointestinal WDL: .WDL except; GI symptoms; all  GI Signs/Symptoms: vomiting (patient has history of self induced vomiting. ) Bowel Sounds: All Quadrants  All Quadrants Bowel Sounds: audible  Additional Documentation: Bowel Sounds (Row)   Tests Performed / Abnormal Results:    CT Abdomen Pelvis w Contrast   Final Result   IMPRESSION:    1.  Large amount of stool fills and distends the rectum likely contributes to symptoms. Remainder of bowel  within normal limits, no inflammatory bowel wall thickening.   2.  Bladder anteriorly displaced and flattened due to mass effect from the filled rectum.      XR Chest Port 1 View   Final Result   IMPRESSION: Minimal change. Lung volumes are low but lungs appear grossly clear. Heart size and pulmonary vessels normal. Abundant intestinal gas persists.        Labs Ordered and Resulted from Time of ED Arrival to Time of ED Departure   BASIC METABOLIC PANEL - Abnormal       Result Value    Sodium 143      Potassium 4.0      Chloride 106      Carbon Dioxide (CO2) 29      Anion Gap 8      Urea Nitrogen 17      Creatinine 0.72      Calcium 9.3      Glucose 103 (*)     GFR Estimate >90     CBC WITH PLATELETS AND DIFFERENTIAL - Abnormal    WBC Count 7.0      RBC Count 4.47      Hemoglobin 11.5 (*)     Hematocrit 38.0 (*)     MCV 85      MCH 25.7 (*)     MCHC 30.3 (*)     RDW 14.6      Platelet Count 188      % Neutrophils 60      % Lymphocytes 27      % Monocytes 10      % Eosinophils 3      % Basophils 0      % Immature Granulocytes 0      NRBCs per 100 WBC 0      Absolute Neutrophils 4.2      Absolute Lymphocytes 1.9      Absolute Monocytes 0.7      Absolute Eosinophils 0.2      Absolute Basophils 0.0      Absolute Immature Granulocytes 0.0      Absolute NRBCs 0.0     ROUTINE UA WITH MICROSCOPIC REFLEX TO CULTURE - Abnormal    Color Urine Yellow      Appearance Urine Cloudy (*)     Glucose Urine Negative      Bilirubin Urine Negative      Ketones Urine 20  (*)     Specific Gravity Urine 1.029      Blood Urine Negative      pH Urine 8.0 (*)     Protein Albumin Urine 30  (*)     Urobilinogen Urine Normal      Nitrite Urine Negative      Leukocyte Esterase Urine Negative      Mucus Urine Present (*)     Amorphous Crystals Urine Few (*)     RBC Urine 6 (*)     WBC Urine 5      Squamous Epithelials Urine 1     HEPATIC FUNCTION PANEL - Abnormal    Bilirubin Total 0.3      Bilirubin Direct <0.1      Protein Total 7.6      Albumin  3.7      Alkaline Phosphatase 108      AST 95 (*)     ALT 82 (*)    LIPASE - Normal    Lipase 187     COVID-19 VIRUS (CORONAVIRUS) BY PCR   Treatments provided: PIV, LABS, COVID, IMAGING, ZOFRAN, ENEMA,   Family Comments: none  OBS brochure/video discussed/provided to patient:  No  ED Medications:   Medications   ondansetron (ZOFRAN) injection 4 mg (4 mg Intravenous Given 7/5/22 2003)   0.9% sodium chloride BOLUS (0 mLs Intravenous Stopped 7/5/22 2121)     Followed by   sodium chloride 0.9% infusion (has no administration in time range)   CT SCAN FLUSH (52 mLs Intravenous Given 7/5/22 2104)   iopamidol (ISOVUE-370) solution 500 mL (45 mLs Intravenous Given 7/5/22 2103)   Enema Compound (docusate/mag cit/mineral oil/NaPhos) SIMPLE (286 mLs Rectal Given 7/5/22 2239)     Drips infusing:  No  For the majority of the shift, the patient's behavior Green. Interventions performed were none.    Sepsis treatment initiated: No     Patient tested for COVID 19 prior to admission: YES    ED Nurse Name/Phone Number: Mya Villarreal RN,   10:52 PM  RECEIVING UNIT ED HANDOFF REVIEW    Above ED Nurse Handoff Report was reviewed: Yes  Reviewed by: Ilene Gage RN on July 6, 2022 at 12:04 AM

## 2022-07-06 NOTE — PLAN OF CARE
Pt transferred to unit at about 0030, non verbal, abd distended, rounded, bowel sounds active, no n/v, scheduled meds given, pt is NPO, no NG, pt is incontinent for bowel and bladder, only swears of bowel movement on pad, Pt was little restless, removed mittens, prn Ativan given with help, PIV saline locked.

## 2022-07-06 NOTE — PROGRESS NOTES
"PRIMARY DIAGNOSIS: \"GENERIC\" NURSING  OUTPATIENT/OBSERVATION GOALS TO BE MET BEFORE DISCHARGE:  1. ADLs back to baseline: Yes    2. Activity and level of assistance: Up with maximum assistance. Up w/ Ax2 @ baseline.    3. Pain status: Pain free.    4. Return to near baseline physical activity: Yes     Discharge Planner Nurse   Safe discharge environment identified: Yes-group home  Barriers to discharge: Yes-needs to have BM       Entered by: Shanita Morelos RN 07/06/2022 11:46 AM     Please review provider order for any additional goals.   Nurse to notify provider when observation goals have been met and patient is ready for discharge.  "

## 2022-07-06 NOTE — PROVIDER NOTIFICATION
Atif page: BP and HR low now 81/50, heart rate 54 now but has been dipping a bit into mid 40s. Do you want IV access for fluid ? thanks

## 2022-07-06 NOTE — PROGRESS NOTES
[facility]    Medicine Progress Note - Hospitalist Service    Date of Admission:  7/5/2022  Date of Service: 07/06/2022    Assessment & Plan          Summary of Stay: Benito Marx is a 56 year old male with a history of  Cerebral palsy with partial quadriplegia (can stand to transfer but typically in a wheelchair), dysphagia who eats with aspiration precautions an hx of aspiration pna,  episodic n/v admitted on 7/5/2022 with intractable n/v     Apparently started today, and was quite profuse, so ultimately EMTs called.  No fevers, has large BM this am.      In the ER VSS but with LGF 99.8, CMP with mildly elevated lfts AST/ALT 95/82 bili wnl lipase wnl,  CBC with normocytic anemia.  UA looks slightly contaminated but without significant inflammatory findings     CT abd/pelvis with large amount of stool in the rectum, moderate fluid filled hiatal herniabut otherwise wnl.      Problem List:   Intractable n/v  Did get a L of NS in the ER, will continue with IVF given so much n/v  NG placed in ER as couldn't get his sx under control- output was blackish ? Bilious with some blood looks to have had about 300 out. Mr Marx just couldn't tolerate it. Distended small bowel ? Ileus or due to constipation   Plan:  - ondansetron/compazine/lorazepam prn to get sx under control  - Clears today  - Continue IVF  - Pain control PRN     Significant burden of constipation   Unsuccessful disimpaction in the ER  Mag citrate enema given in the ER  Plan:  - Monitor, symptoms seem improving but no BM yet      Elevated LFTs new issue, seems all liver and not biliary, CT with prior christine. LFTs in AM     Dysphagia with hx of aspiration   Discharged with regular diet after hospitalization in 1/2022.     Cerebral palsy - partial quadriplegia, wheelchair bound for the most part  Intellectual disability-essentially non verbal  Will cont with olanzapine and tizanidine      Malnutrition very thin on exam -  cachectic     [place]     COVID 19 pending  S/p moderna 3 shot series 1/2022       Diet: Clear Liquid Diet Extremely Thick (level 4) (per group home)    DVT Prophylaxis: Pneumatic Compression Devices  Luke Catheter: Not present  Central Lines: None  Cardiac Monitoring: None  Code Status: Full Code      Disposition Plan      Expected Discharge Date: 07/07/2022                The patient's care was discussed with the Bedside Nurse.    Ghanshyam Brown MD  Hospitalist Service  [facility]  Securely message with the Vocera Web Console (learn more here)  Text page via PushPage Paging/Directory         Clinically Significant Risk Factors Present on Admission                          ______________________________________________________________________    Interval History     Non-verbal  Has pulled out multiple IVs now  Appears comfortable overall  No BMs yet    Data reviewed today: I reviewed all medications, new labs and imaging results over the last 24 hours. I personally reviewed no images or EKG's today.    Physical Exam   Vital Signs: Temp: (!) 96.6  F (35.9  C) Temp src: Temporal BP: 101/60 Pulse: 54   Resp: 12 SpO2: 99 % O2 Device: None (Room air)    Weight: 100 lbs 11.2 oz    General: calm, but no meaningful exhange of information  HEENT:  Head nc/at temporal wasting is present, sclera clear PERRL  Lungs: cta b nl effort   CV:  RRR no m/r/g no le edema  Abd: flat moderately firm with no tenderness to palpation  Neuro:  unassessable   Calmly laying in bed  All extremities very thin with some contractures  Skin:  W/d no c/c    Data   Recent Labs   Lab 07/05/22  1805   WBC 7.0   HGB 11.5*   MCV 85         POTASSIUM 4.0   CHLORIDE 106   CO2 29   BUN 17   CR 0.72   ANIONGAP 8   ASTRID 9.3   *   ALBUMIN 3.7   PROTTOTAL 7.6   BILITOTAL 0.3   ALKPHOS 108   ALT 82*   AST 95*   LIPASE 187     Recent Results (from the past 24 hour(s))   XR Chest Port 1 View     Narrative    EXAM: XR CHEST PORT 1 VIEW  LOCATION: [facility]  DATE/TIME: 7/5/2022 8:27 PM    INDICATION: vomiting, eval aspiration  COMPARISON: 11/10/2022      Impression    IMPRESSION: Minimal change. Lung volumes are low but lungs appear grossly clear. Heart size and pulmonary vessels normal. Abundant intestinal gas persists.   CT Abdomen Pelvis w Contrast    Narrative    EXAM: CT ABDOMEN PELVIS W CONTRAST  LOCATION: [facility]  DATE/TIME: 7/5/2022 8:58 PM    INDICATION: abd pain, vomiting, history of SBO  COMPARISON: 10/15/2018  TECHNIQUE: CT scan of the abdomen and pelvis was performed following injection of IV contrast. Multiplanar reformats were obtained. Dose reduction techniques were used.  CONTRAST: 45mL Isovue 370    FINDINGS:   LOWER CHEST: Modest fluid-filled hiatal hernia unchanged.    HEPATOBILIARY: Prior cholecystectomy.    PANCREAS: Normal.    SPLEEN: Normal.    ADRENAL GLANDS: Normal.    KIDNEYS/BLADDER: Normal.    BOWEL: No obstruction or inflammatory change. Large amount stool fills the rectum is distended up to 9 x 7.5 cm. No inflammatory bowel wall thickening. There is no definite obstruction with air filling the majority of bowel which appears normal in   caliber.    LYMPH NODES: Normal.    VASCULATURE: Unremarkable.    PELVIC ORGANS: Bladder is flattened and anteriorly displaced due to the film and rectum. Tiny air bubble within bladder presumably relates to catheterization.    MUSCULOSKELETAL: No suspicious lesion.      Impression    IMPRESSION:   1.  Large amount of stool fills and distends the rectum likely contributes to symptoms. Remainder of bowel within normal limits, no inflammatory bowel wall thickening.  2.  Bladder anteriorly displaced and flattened due to mass effect from the filled rectum.   XR Abdomen Port 1 View    Narrative    EXAM: XR ABDOMEN PORT 1 VIEWS  LOCATION: [facility]  DATE/TIME: 7/5/2022 11:57  PM    INDICATION: NGT placement  COMPARISON: Prior CT obtained earlier on 7/5/2022 user comparison      Impression    IMPRESSION: An NG tube is seen coiled in the expected position of the proximal stomach. Multiple air-filled loops of small and large bowel are again seen throughout the abdomen, similar to prior exam    XR Abdomen Port 1 View    Narrative    EXAM: XR ABDOMEN PORT 1 VIEWS  LOCATION: [facility]  DATE/TIME: 7/5/2022 11:57 PM    INDICATION: NG tube placement  COMPARISON: The abdominal x-ray obtained on 7/5/2022 at 2332 hours used for comparison      Impression    IMPRESSION: The NG tube is been retracted and the side-port is now the level of the GE junction, this should be repositioned and advanced approximately 5 to 10 cm for optimal positioning. Multiple air-filled loops of small and large bowel are again seen   throughout the abdomen, unchanged.      Medications       OLANZapine  2.5 mg Oral At Bedtime     pantoprazole (PROTONIX) IV  40 mg Intravenous Daily with breakfast     scopolamine  1 patch Transdermal Q72H    And     scopolamine   Transdermal Q8H     tiZANidine  4 mg Oral TID

## 2022-07-06 NOTE — CONSULTS
Care Management Initial Consult    General Information  Assessment completed with: Caregiver, Rebecca ZONIA [phone]  Type of CM/SW Visit: Initial Assessment    Primary Care Provider verified and updated as needed:     Readmission within the last 30 days: no previous admission in last 30 days   Return Category: New Diagnosis     Advance Care Planning: Advance Care Planning Reviewed: present on chart (Guardians)        Communication Assessment  Patient's communication style: spoken language (English or Bilingual)    Hearing Difficulty or Deaf: no      Cognitive  Cognitive/Neuro/Behavioral: .WDL except  Level of Consciousness: alert  Arousal Level: opens eyes spontaneously  Orientation: other (see comments) (RYLAND)  Mood/Behavior: restless  Best Language: 2 - Severe aphasia  Speech: unable to speak    Living Environment:   People in home: facility resident     Current living Arrangements: group home ([facility] [phone] Fax [phone])      Able to return to prior arrangements: yes       Family/Social Support:  Care provided by: other (see comments)  Provides care for: no one  Marital Status: Single  Facility resident(s)/Staff, Guardian          Description of Support System: Supportive, Involved    Support Assessment: Adequate family and caregiver support    Current Resources:   Patient receiving home care services: No     Community Resources: Transportation Services, Group Home  Equipment currently used at home: wheelchair, manual  Supplies currently used at home:      Employment/Financial:  Employment Status:          Financial Concerns:             Lifestyle & Psychosocial Needs:  Social Determinants of Health     Tobacco Use: Low Risk      Smoking Tobacco Use: Never Smoker     Smokeless Tobacco Use: Never Used   Alcohol Use: Not on file   Financial Resource Strain: Not on file   Food Insecurity: Not on file   Transportation Needs: Not on file   Physical Activity: Not on file   Stress: Not on file  "  Social Connections: Not on file   Intimate Partner Violence: Not on file   Depression: Not on file   Housing Stability: Not on file     Functional Status:  Prior to admission patient needed assistance:   Dependent ADLs:: Wheelchair-independent, Eating  Dependent IADLs:: Cleaning, Cooking, Laundry, Shopping, Meal Preparation, Medication Management, Money Management, Transportation     Mental Health Status:        Chemical Dependency Status:            Values/Beliefs:  Spiritual, Cultural Beliefs, Temple Practices, Values that affect care:                 Additional Information:  Attempted to call pt guardian Suzanne Priscilla [phone], no answer. Did not leave VM as VM box wasn't set up. Attempted to call  Chelsea [phone], no answer and unable to leave voice message.    ANIA spoke with [facility]  staff at [phone] who instructed this writer to call Rebecca  RN at [phone]. ANIA spoke with Rebecca who informed writer Chelsea is no longer an employee. Their new  is [facility] [phone]. Requested [employer] to update pt's facesheet. Per notes there was a \"Ceci Nunn -  [phone] \" listed as pt's . However Rebecca was uncertain of the accuracy. ANIA attempted to call the phone number but the  stated it was a Sam with [facility]. Writer did not leave a VM.     Rebecca reports pt is non verbal. At baseline pt is fairly independent in his wheelchair. Pt is able to transfer to/from wheelchair.  assist with pt's restricted diet, reminders for ADL's and meds.     Per Rebecca they use [facility]. At discharge they would like medications sent to [facility]. For rides, hospital staff is to call [facility] to arrange transport. The  will bring pt's wheelchair when they pick him up since pt came via EMS.  fax number is [phone].            Contact Information  Guardians: Suzanne Priscilla or Kerri Arias [phone]  Group home: " [facility]  [phone] Fax [phone]  [facility] : [facility] [phone]   Nurse: Rebecca [phone]    NILAM Busby

## 2022-07-06 NOTE — H&P
History and Physical     Benito Marx MRN# [id number]   YOB: 1965 Age: 56 year old      Date of Admission:  7/5/2022    Primary care provider: Pily Blackmon          Assessment and Plan:     Summary of Stay: Benito Marx is a 56 year old male with a history of  Cerebral palsy with partial quadriplegia (can stand to transfer but typically in a wheelchair), dysphagia who eats with aspiration precautions an hx of aspiration pna,  episodic n/v admitted on 7/5/2022 with intractable n/v    Apparently started today, and was quite profuse, so ultimately EMTs called.  No fevers, has large BM this am.     In the ER VSS but with LGF 99.8, CMP with mildly elevated lfts AST/ALT 95/82 bili wnl lipase wnl,  CBC with normocytic anemia.  UA looks slightly contaminated but without significant inflammatory findings    CT abd/pelvis with large amount of stool in the rectum, moderate fluid filled hiatal herniabut otherwise wnl.     Problem List:   Intractable n/v  Did get a L of NS in the ER, will continue with IVF given so much n/v  NG placed in ER as couldn't get his sx under control- output was blackish ? Bilious with some blood looks to have had about 300 out. Mr Marx just couldn't tolerate it  Will give ondansetron/compazine/lorazepam prn to get sx under control add scopolamine patch in case having some vertiginous sx that we're unaware of.  IV PPI.  NPO x ice chips and meds then if things calm down could start clears   Distended small bowel ? Ileus or due to constipation   I suspect some of what appeared to be bloody in the NG output probably trauma related but will follow hgb    Significant burden of constipation   Unsuccessful disimpaction in the ER  Mag citrate enema given in the ER  -see how they do but if unsuccessful may need GGE     Elevated LFTs new issue, seems all liver and not biliary, CT with prior christine,   Recheck in am    Dysphagia with hx of aspiration   Discharged with regular diet after  hospitalization in 1/2022  Keep NPO for now given refractory n/v    Cerebral palsy - partial quadriplegia, wheelchair bound for the most part  Intellectual disability-essentially non verbal  Will cont with olanzapine and tizanidine     Malnutrition very thin on exam - cachectic    [place]    COVID 19 pending  S/p moderna 3 shot series 1/2022      DVT Prophylaxis: Pneumatic Compression Devices  Code Status: Full Code  Functional Status: partial quadriplegia (can stand to transfer but typically in a wheelchair).  No Code status documentation at group home or that accompanies him here.  Prior hospitalization full code   Luke: not needed  Access:   PIV                 Chief Complaint:     n/v       History of Present Illness:   Benito Marx is a 56 year old male with a history of  Cerebral palsy with partial quadriplegia (can stand to transfer but typically in a wheelchair), dysphagia who eats with aspiration precautions an hx of aspiration pna,  episodic n/v admitted on 7/5/2022 with intractable n/v    Apparently started today, and was quite profuse, so ultimately EMTs called.  No fevers, has large BM this am.     In the ER VSS but with LGF 99.8, CMP with mildly elevated lfts AST/ALT 95/82 bili wnl lipase wnl,  CBC with normocytic anemia.  UA looks slightly contaminated but without significant inflammatory findings    CT abd/pelvis with large amount of stool in the rectum, moderate fluid filled hiatal herniabut otherwise wnl.       The history is obtained in discussion with the ER provider Nii Benjamin, I reached out to the group home but was able to obtain limited information as the person there was not there all day when the sx started     Epic and Care everywhere were extensively reviewed        Past Medical History:     Past Medical History:   Diagnosis Date     Adjustment disorder with depressed mood      Cerebral palsy (H)      Constipated      GERD (gastroesophageal reflux disease)       History of aspiration pneumonia      Mental retardation, moderate (I.Q. 35-49)     prenatal hypoxia     Seizures (H)     LAST Sz 1994, No AEM as of 10.15.18             Past Surgical History:     Past Surgical History:   Procedure Laterality Date     CHOLECYSTECTOMY               Social History:     Social History     Tobacco Use     Smoking status: Never Smoker     Smokeless tobacco: Never Used   Substance Use Topics     Alcohol use: Never             Family History:     Family History   Problem Relation Age of Onset     No Known Problems Mother      No Known Problems Father      No Known Problems Maternal Grandmother      No Known Problems Maternal Grandfather      No Known Problems Paternal Grandmother      No Known Problems Paternal Grandfather      No Known Problems Brother             Allergies:     Allergies   Allergen Reactions     Nutritional Supplements Nausea and Vomiting     increased vomiting when taking this.     Seasonal Allergies              Medications:   Await med rec but per  notes    citerizine  10 every day  cital 40 mg every day  Montelukast 10 mg every day  olanzpine 2.5 mg every day  pantop 20 mg every day  tizanidine 4 mg tid            Review of Systems:     A Comprehensive greater than 10 system review of systems was carried out.  Pertinent positives and negatives are noted above.  Otherwise negative for contributory information.           Physical Exam:   Blood pressure 110/88, pulse 83, temperature 99.8  F (37.7  C), temperature source Temporal, resp. rate 17, SpO2 95 %.  Exam:    General:  Rolling around on gurney howling while NG trying to be put in, now up on the floor and appears calm.  No meaningful exhange of information  HEENT:  Head nc/at temporal wasting is present, sclera clear PERRL O/P:  Mouth and eyes closed.  Body seems stiff all over  Lungs: cta b nl effort   CV:  RRR no m/r/g no le edema  Abd: flat moderately firm not particularly ttp but difficult to know for  sure  Neuro:  Completely unassessable   Calmly laying in bed  All extremities very thin with some contractures  Skin:  W/d no c/c               Data:          Lab Results   Component Value Date     07/05/2022     10/15/2018    Lab Results   Component Value Date    CHLORIDE 106 07/05/2022    CHLORIDE 108 10/15/2018    Lab Results   Component Value Date    BUN 17 07/05/2022    BUN 21 10/15/2018      Lab Results   Component Value Date    POTASSIUM 4.0 07/05/2022    POTASSIUM 4.5 10/15/2018    Lab Results   Component Value Date    CO2 29 07/05/2022    CO2 30 10/15/2018    Lab Results   Component Value Date    CR 0.72 07/05/2022    CR 0.71 10/15/2018        Lab Results   Component Value Date    WBC 7.0 07/05/2022    HGB 11.5 (L) 07/05/2022    HCT 38.0 (L) 07/05/2022    MCV 85 07/05/2022     07/05/2022     Lab Results   Component Value Date    AST 95 (H) 07/05/2022    ALT 82 (H) 07/05/2022    ALKPHOS 108 07/05/2022    BILITOTAL 0.3 07/05/2022            Imaging:     Recent Results (from the past 24 hour(s))   XR Chest Port 1 View    Narrative    EXAM: XR CHEST PORT 1 VIEW  LOCATION: [facility]  DATE/TIME: 7/5/2022 8:27 PM    INDICATION: vomiting, eval aspiration  COMPARISON: 11/10/2022      Impression    IMPRESSION: Minimal change. Lung volumes are low but lungs appear grossly clear. Heart size and pulmonary vessels normal. Abundant intestinal gas persists.   CT Abdomen Pelvis w Contrast    Narrative    EXAM: CT ABDOMEN PELVIS W CONTRAST  LOCATION: [facility]  DATE/TIME: 7/5/2022 8:58 PM    INDICATION: abd pain, vomiting, history of SBO  COMPARISON: 10/15/2018  TECHNIQUE: CT scan of the abdomen and pelvis was performed following injection of IV contrast. Multiplanar reformats were obtained. Dose reduction techniques were used.  CONTRAST: 45mL Isovue 370    FINDINGS:   LOWER CHEST: Modest fluid-filled hiatal hernia unchanged.    HEPATOBILIARY: Prior  cholecystectomy.    PANCREAS: Normal.    SPLEEN: Normal.    ADRENAL GLANDS: Normal.    KIDNEYS/BLADDER: Normal.    BOWEL: No obstruction or inflammatory change. Large amount stool fills the rectum is distended up to 9 x 7.5 cm. No inflammatory bowel wall thickening. There is no definite obstruction with air filling the majority of bowel which appears normal in   caliber.    LYMPH NODES: Normal.    VASCULATURE: Unremarkable.    PELVIC ORGANS: Bladder is flattened and anteriorly displaced due to the film and rectum. Tiny air bubble within bladder presumably relates to catheterization.    MUSCULOSKELETAL: No suspicious lesion.      Impression    IMPRESSION:   1.  Large amount of stool fills and distends the rectum likely contributes to symptoms. Remainder of bowel within normal limits, no inflammatory bowel wall thickening.  2.  Bladder anteriorly displaced and flattened due to mass effect from the filled rectum.   XR Abdomen Port 1 View    Narrative    EXAM: XR ABDOMEN PORT 1 VIEWS  LOCATION: [facility]  DATE/TIME: 7/5/2022 11:57 PM    INDICATION: NGT placement  COMPARISON: Prior CT obtained earlier on 7/5/2022 user comparison      Impression    IMPRESSION: An NG tube is seen coiled in the expected position of the proximal stomach. Multiple air-filled loops of small and large bowel are again seen throughout the abdomen, similar to prior exam    XR Abdomen Port 1 View    Narrative    EXAM: XR ABDOMEN PORT 1 VIEWS  LOCATION: [facility]  DATE/TIME: 7/5/2022 11:57 PM    INDICATION: NG tube placement  COMPARISON: The abdominal x-ray obtained on 7/5/2022 at 2332 hours used for comparison      Impression    IMPRESSION: The NG tube is been retracted and the side-port is now the level of the GE junction, this should be repositioned and advanced approximately 5 to 10 cm for optimal positioning. Multiple air-filled loops of small and large bowel are again seen   throughout the  abdomen, unchanged.

## 2022-07-06 NOTE — ED NOTES
Neuro Cognitive (Adult) - Cognitive/Neuro/Behavioral WDL: .WDL except; speech  Level of Consciousness: confused  Speech:  (nonverbal)  Stroke Exam Continued - Level of Consciousness: confused  Speech:  (nonverbal)  Cognitive - Communication / Language: expressive disorder     Respiratory WDL - Respiratory WDL: .WDL except; rhythm/pattern  Rhythm/Pattern, Respiratory: pattern regular; unlabored   Breath Sounds - Breath Sounds: All Fields  All Lung Fields Breath Sounds: coarse     astrointestinal - Gastrointestinal WDL: .WDL except; GI symptoms; all  GI Signs/Symptoms: vomiting (patient has history of self induced vomiting. ) Bowel Sounds: All Quadrants  All Quadrants Bowel Sounds: audible  Additional Documentation: Bowel Sounds (Row)

## 2022-07-07 NOTE — PLAN OF CARE
"  PRIMARY DIAGNOSIS: \"GENERIC\" NURSING  OUTPATIENT/OBSERVATION GOALS TO BE MET BEFORE DISCHARGE:  1. ADLs back to baseline: No    2. Activity and level of assistance: Up with maximum assistance. Consider SW and/or PT evaluation.     3. Pain status: Pain free.    4. Return to near baseline physical activity: No     Discharge Planner Nurse   Safe discharge environment identified: Yes  Barriers to discharge: Yes       Entered by: Luis Kamara RN 07/07/2022 12:35 AM    BP (!) 89/52 (BP Location: Right arm)   Pulse 80   Temp 97.7  F (36.5  C) (Temporal)   Resp 20   Wt 45.7 kg (100 lb 11.2 oz)   SpO2 96%   BMI 19.67 kg/m    Pt is non verbal. Pt able to make gestures for simple answers. Pt was anxious at the beginning of the shift and was given Ativan. Pt is on IVF LR @ 100 ml/hr. No acute distress noted. Pt is on clear liquid diet, extremely thick. Pt is on aspiration precaution. Pt was turning and repositioned during the shift. No vomiting noted. Pt is assist of two in transfer and cares. [place] patch in patch. Pt is sleeping in bed. Bed alarm in place and call light within reach. Will continue to monitor and assess pt.   Please review provider order for any additional goals.   Nurse to notify provider when observation goals have been met and patient is ready for discharge.  "

## 2022-07-07 NOTE — PLAN OF CARE
PRIMARY DIAGNOSIS: GASTROENTERITIS    OUTPATIENT/OBSERVATION GOALS TO BE MET BEFORE DISCHARGE  1. Orthostatic performed: N/A    2. Tolerating PO fluid and/or antibiotics (if applicable): Yes    3. Nausea/Vomiting/Diarrhea symptoms improved: Yes    4. Pain status: Improved-controlled with oral pain medications.    5. Return to near baseline physical activity: No  Pt continues to have some constipation, but passing flatus.  + bowel sounds.  Gave senna x2, and suppository.  Will give miralax next.  BP low-normal.  Will monitor  Discharge Planner Nurse   Safe discharge environment identified: Yes  Barriers to discharge: No       Entered by: Anju Sanchez RN 07/07/2022 1:28 PM     Please review provider order for any additional goals.   Nurse to notify provider when observation goals have been met and patient is ready for discharge.Goal Outcome Evaluation:

## 2022-07-07 NOTE — PROGRESS NOTES
[facility]    Medicine Progress Note - Hospitalist Service    Date of Admission:  7/5/2022  Date of Service: 07/07/2022    Assessment & Plan          Summary of Stay: Benito Marx is a 56 year old male with a history of  Cerebral palsy with partial quadriplegia (can stand to transfer but typically in a wheelchair), dysphagia who eats with aspiration precautions an hx of aspiration pna,  episodic n/v admitted on 7/5/2022 with intractable n/v     Apparently started today, and was quite profuse, so ultimately EMTs called.  No fevers, has large BM this am.      In the ER VSS but with LGF 99.8, CMP with mildly elevated lfts AST/ALT 95/82 bili wnl lipase wnl,  CBC with normocytic anemia.  UA looks slightly contaminated but without significant inflammatory findings     CT abd/pelvis with large amount of stool in the rectum, moderate fluid filled hiatal herniabut otherwise wnl.      Problem List:   Intractable n/v  Did get a L of NS in the ER, will continue with IVF given so much n/v  NG placed in ER as couldn't get his sx under control- output was blackish ? Bilious with some blood looks to have had about 300 out. Mr Marx just couldn't tolerate it. Distended small bowel ? Ileus or due to constipation   Plan:  - ondansetron/compazine/lorazepam prn to get sx under control  -nausea improved today as per bedside RN   Pain control PRN     Significant burden of constipation   Unsuccessful disimpaction in the ER  Mag citrate enema given in the ER  Plan:  - Monitor, symptoms seem improving but no BM yet    miralax daily ordered   dulcolx suppository ordered   Getting senna       Elevated LFTs new issue, seems all liver and not biliary, CT with prior christine. LFTs in AM     Dysphagia with hx of aspiration   Discharged with regular diet after hospitalization in 1/2022.     Cerebral palsy - partial quadriplegia, wheelchair bound for the most part  Intellectual disability-essentially non verbal  Will cont with  olanzapine and tizanidine      Malnutrition very thin on exam - cachectic     [place]     COVID 19 pending  S/p moderna 3 shot series 1/2022       Diet: Clear Liquid Diet Extremely Thick (level 4) (per group home)    DVT Prophylaxis: Pneumatic Compression Devices  Luke Catheter: Not present  Central Lines: None  Cardiac Monitoring: None  Code Status: Full Code      Disposition Plan      Expected Discharge Date: 07/07/2022      Destination: group home          The patient's care was discussed with the Bedside Nurse.    Deloris Gomez MD  Hospitalist Service  [facility]  Securely message with the Vocera Web Console (learn more here)  Text page via [employer] Paging/Directory         Clinically Significant Risk Factors Present on Admission             # Hypoalbuminemia: Albumin = 3.0 g/dL (Ref range: 3.4 - 5.0 g/dL) on admission, will monitor as appropriate              ______________________________________________________________________    Interval History     Non-verbal  Has pulled out multiple IVs now again one today   Appears comfortable overall  No BMs yet    Data reviewed today: I reviewed all medications, new labs and imaging results over the last 24 hours. I personally reviewed no images or EKG's today.    Physical Exam   Vital Signs: Temp: 97.6  F (36.4  C) Temp src: Temporal BP: 94/53 Pulse: 56   Resp: 20 SpO2: 94 % O2 Device: None (Room air)    Weight: 100 lbs 11.2 oz    General: calm, answers questions by yes and no   HEENT:  Head nc/at temporal wasting is present, sclera clear PERRL  Lungs: cta b nl effort   CV:  RRR no m/r/g no le edema  Abd: flat moderately firm with no tenderness to palpation  Neuro:  unassessable   Calmly laying in bed  All extremities very thin with some contractures  Skin:  W/d no c/c    Data   Recent Labs   Lab 07/07/22  0555 07/05/22  1805   WBC 3.9* 7.0   HGB 10.1* 11.5*   MCV 89 85   * 188    143   POTASSIUM 3.6 4.0    CHLORIDE 109 106   CO2 26 29   BUN 28 17   CR 0.60* 0.72   ANIONGAP 9 8   ASTRID 8.2* 9.3   GLC 74 103*   ALBUMIN 3.0* 3.7   PROTTOTAL 6.1* 7.6   BILITOTAL 0.5 0.3   ALKPHOS 103 108   ALT 46 82*   AST 24 95*   LIPASE  --  187     No results found for this or any previous visit (from the past 24 hour(s)).  Medications       bisacodyl  10 mg Rectal Once     OLANZapine  2.5 mg Oral At Bedtime     pantoprazole (PROTONIX) IV  40 mg Intravenous Daily with breakfast     polyethylene glycol  17 g Oral Daily     scopolamine  1 patch Transdermal Q72H    And     scopolamine   Transdermal Q8H     tiZANidine  4 mg Oral TID

## 2022-07-07 NOTE — PROGRESS NOTES
"PRIMARY DIAGNOSIS: \"GENERIC\" NURSING  OUTPATIENT/OBSERVATION GOALS TO BE MET BEFORE DISCHARGE:  1. ADLs back to baseline: Yes    2. Activity and level of assistance: Up with maximum assistance. Pt Ax2 w/ lift @ baseline.    3. Pain status: Pain free.    4. Return to near baseline physical activity: Yes     Discharge Planner Nurse   Safe discharge environment identified: Yes-group home.  Barriers to discharge: Yes       Entered by: Shanita Morelos RN 07/06/2022 7:35 PM     Pt alert, more restless towards end of shift. IV ativan given this AM for restlessness & pt was able to sleep for awhile. Unable to assess orientation status. Non-verbal. Able to communicate w/ yes/no questions. VS: BP became low this evening, 81/50, kalia FIELDS and an IV was placed and fluids started. BP came up to 94/46. Turned and repositioned w/ Ax2. Voiding in good amts-incontinent. Pt did not report any nausea/vomiting this shift. Denies abd pain. Clear liquids started-thickened. Tolerating well so far. No BM yet but passing gas. Plan is to return to group home upon discharge.   "

## 2022-07-07 NOTE — PLAN OF CARE
PRIMARY DIAGNOSIS: GASTROENTERITIS careplan- Impaction     OUTPATIENT/OBSERVATION GOALS TO BE MET BEFORE DISCHARGE  1. Orthostatic performed: N/A     2. Tolerating PO fluid and/or antibiotics (if applicable): Yes     3. Nausea/Vomiting/Diarrhea symptoms improved: Yes     4. Pain status: Improved-had smear to small amount of stool -soft brown today after suppository     5. Return to near baseline physical activity: No    Pt continues to have some constipation, but passing flatus.  + bowel sounds.  Gave senna two tablets x2, suppository and Milk of Magnesia (thickened). BP low-normal.  Tolerates thickened clear liquids no nausea or vomiting    Discharge Planner Nurse   Safe discharge environment identified: Yes, back to group home  Barriers to discharge: No       Entered by: Anju Sanchez RN 07/07/2022 1:28 PM  Please review provider order for any additional goals.   Nurse to notify provider when observation goals have been met and patient is ready for discharge.  Goal Outcome Evaluation: Ongoing, progressing

## 2022-07-07 NOTE — UTILIZATION REVIEW
"Concurrent stay review; Secondary Review Determination      Under the authority of the Utilization Management Committee, the utilization review process indicated a secondary review on the above patient. The review outcome is based on review of the medical records, discussions with staff, and applying clinical experience noted on the date of the review.      (x) Observation Status Appropriate - Concurrent stay review      RATIONALE FOR DETERMINATION:  56 year old male with a history of  Cerebral palsy with partial quadriplegia (can stand to transfer but typically in a wheelchair), dysphagia who eats with aspiration precautions an hx of aspiration pna,  episodic n/v admitted on 7/5/2022 with intractable n/v    Vital signs notable for hypotension with SBP  range.  No fever or hypoxia    Labs show pancytopenia today.  AST and ALT mildly elevated.    CT abdomen shows  a \"large amount of stool fills and distends the rectum likely contributes to symptoms. Remainder of bowel within normal limits, no inflammatory bowel wall thickening.\"    NGT placement was attempted but apparently not tolerated.    Pt is being treated with multiple bowel meds to induce BM    For now would continue observation status.  If unable to discharge within 24-48 hours due to ongoing medical issues, consider reassessment for inpatient status.       Patient is clinically improving and there is no clear indication to change patient's status to inpatient. The severity of illness, intensity of service provided, expected LOS and risk for adverse outcome make the care appropriate for observation.      The information on this document is developed by the utilization review team in order for the business office to ensure compliance. This only denotes the appropriateness of proper admission status and does not reflect the quality of care rendered.   The definitions of Inpatient Status and Observation Status used in making the determination above are " those provided in the CMS Coverage Manual, Chapter 1 and Chapter 6, section 70.4.      Sincerely,      Josh Jarvis MD  Utilization Review   Physician Advisor   [facility].

## 2022-07-07 NOTE — PLAN OF CARE
PRIMARY DIAGNOSIS: GASTROENTERITIS careplan- Impaction     OUTPATIENT/OBSERVATION GOALS TO BE MET BEFORE DISCHARGE  1. Orthostatic performed: N/A     2. Tolerating PO fluid and/or antibiotics (if applicable): Yes     3. Nausea/Vomiting/Diarrhea symptoms improved: Yes     4. Pain status: Improved-controlled with oral pain medications.     5. Return to near baseline physical activity: No    Pt continues to have some constipation, but passing flatus.  + bowel sounds.  Gave senna x2, and suppository. BP low-normal.  Will monitor    Discharge Planner Nurse   Safe discharge environment identified: Yes, back to group home  Barriers to discharge: No       Entered by: Anju Sanchez RN 07/07/2022 1:28 PM  Please review provider order for any additional goals.   Nurse to notify provider when observation goals have been met and patient is ready for discharge.  Goal Outcome Evaluation: Ongoing, progressing

## 2022-07-07 NOTE — PLAN OF CARE
PRIMARY DIAGNOSIS: Fecal impaction, Intractable vomiting, presence of nausea not specified, unspecified vomiting type  OUTPATIENT/OBSERVATION GOALS TO BE MET BEFORE DISCHARGE:  1. ADLs back to baseline: No    2. Activity and level of assistance: Up with maximum assistance. Consider SW and/or PT evaluation.     3. Pain status: Pain free.    4. Return to near baseline physical activity: No     Discharge Planner Nurse   Safe discharge environment identified: Yes  Barriers to discharge: Yes       Entered by: Luis Kamara RN 07/07/2022 2:56 AM    BP (!) 89/52 (BP Location: Right arm)   Pulse 80   Temp 97.7  F (36.5  C) (Temporal)   Resp 20   Wt 45.7 kg (100 lb 11.2 oz)   SpO2 96%   BMI 19.67 kg/m      Please review provider order for any additional goals.   Nurse to notify provider when observation goals have been met and patient is ready for discharge.

## 2022-07-08 NOTE — PROGRESS NOTES
Care Management Follow Up    Length of Stay (days): 0    Expected Discharge Date: 07/08/2022     Concerns to be Addressed: discharge planning     Patient plan of care discussed at interdisciplinary rounds: Yes    Anticipated Discharge Disposition:       Anticipated Discharge Services:    Anticipated Discharge DME:      Patient/family educated on Medicare website which has current facility and service quality ratings:    Education Provided on the Discharge Plan:    Patient/Family in Agreement with the Plan:      Referrals Placed by CM/SW:    Private pay costs discussed: Not applicable    Additional Information:    Spoke with [facility]  Ida ([phone]) to inform her that pt is ready for discharge and to set up transport. Ida stated that they would like orders faxed and will call back with a time that they will  pt.     Spoke with pt guardian Suzanne Wells ([phone]) to inform her of discharge plans. Suzanne was in agreement with the plan and stated that there was no further information that she needs from [facility].     Will update HUC and RN once discharge time is set.     NILAM Park, LGANIA  Inpatient Care Coordination  Ortho/Spine Unit  [phone]  [facility] Park

## 2022-07-08 NOTE — PLAN OF CARE
Pt orientation status RYLAND; non-verbal. Soft BP of 97/35; MD made aware. PAINAD score of 1. PO and IV Zofran each given once for nausea. One episode of emesis. Safety rodney to R hand. Bedrest; repositioned frequently. Incontinent of bowel and bladder. One small BM at beginning of shift. Level 4 thickened, clear liquids. Takes pills whole with liquids. Plan to discharge back to  today.

## 2022-07-08 NOTE — DISCHARGE SUMMARY
[facility]  Discharge Summary        Benito Marx MRN# [id number]   YOB: 1965 Age: 56 year old     Date of Admission:  7/5/2022  Date of Discharge:  7/8/2022  Admitting Physician:  Deb Kim MD  Discharge Physician: Deloris Gomez MD  Discharging Service: Hospitalist     Primary Provider: Piyl Blackmon  Primary Care Physician Phone Number: [phone]         Discharge Diagnoses/Problem Oriented Hospital Course (Providers):    Benito Marx was admitted on 7/5/2022 by Deb Kim MD and I would refer you to their history and physical.  The following problems were addressed during his hospitalization:    Summary of Stay: Benito Marx is a 56 year old male with a history of  Cerebral palsy with partial quadriplegia (can stand to transfer but typically in a wheelchair), dysphagia who eats with aspiration precautions an hx of aspiration pna,  episodic n/v admitted on 7/5/2022 with intractable n/v     Apparently started today, and was quite profuse, so ultimately EMTs called.  No fevers, has large BM this am.      In the ER VSS but with LGF 99.8, CMP with mildly elevated lfts AST/ALT 95/82 bili wnl lipase wnl,  CBC with normocytic anemia.  UA looks slightly contaminated but without significant inflammatory findings     CT abd/pelvis with large amount of stool in the rectum, moderate fluid filled hiatal herniabut otherwise wnl.      Problem List:   Intractable n/v likely secondary to constipation  Did get a L of NS in the ER, will continue with IVF given so much n/v  NG placed in ER as couldn't get his sx under control- output was blackish ? Bilious with some blood looks to have had about 300 out. Mr Marx just couldn't tolerate it. Distended small bowel ? Ileus or due to constipation   Plan:  - ondansetron/compazine/lorazepam prn to get sx under control  -nausea improved today as per bedside RN   Pain control PRN  Nausea much improved     Significant burden of constipation    Unsuccessful disimpaction in the ER  Mag citrate enema given in the ER  Plan:  - Monitor, symptoms seem improving but no BM yet     miralax daily ordered   dulcolx suppository ordered   Patient had a small BM yesterday  Getting senna       Elevated LFTs new issue, seems all liver and not biliary, CT with prior christine.  LFTs improved on repeat testing     Dysphagia with hx of aspiration   Discharged with regular diet after hospitalization in 1/2022.     Cerebral palsy - partial quadriplegia, wheelchair bound for the most part  Intellectual disability-essentially non verbal  Will cont with olanzapine and tizanidine      Malnutrition very thin on exam - cachectic     Appears to be khalil of the [facility]     COVID 19 pending  S/p moderna 3 shot series 1/2022           Diet: Clear Liquid Diet Extremely Thick (level 4) (per group home)    DVT Prophylaxis: Pneumatic Compression Devices  Luke Catheter: Not present  Central Lines: None  Cardiac Monitoring: None  Code Status: Full Code          Disposition Plan        Expected Discharge Date: 07/08/2022      Destination: group home         Deloris Gomez MD   Page [phone](7AM-6PM)        Code Status:      Full Code        Brief Hospital Stay Summary Sent Home With Patient in AVS:        Reason for your hospital stay      Nausea and vomiting secondary to severe constipation                 Important Results:      Please see below        Pending Results:        Unresulted Labs Ordered in the Past 30 Days of this Admission     No orders found from 6/5/2022 to 7/6/2022.            Discharge Instructions and Follow-Up:      Follow-up Appointments     Follow-up and recommended labs and tests       Follow up with primary care provider, Pily Blackmon, within 7 days for   hospital follow- up.  No follow up labs or test are needed.               Discharge Disposition:      Discharged to group home         Discharge Medications:        Discharge Medication List as of 7/8/2022 10:27 AM       START taking these medications    Details   bisacodyl (DULCOLAX) 10 MG suppository Place 1 suppository (10 mg) rectally daily as needed for constipation, Disp-30 suppository, R-0, E-Prescribe      ondansetron (ZOFRAN ODT) 4 MG ODT tab Take 1 tablet (4 mg) by mouth every 4 hours as needed for nausea or vomiting, Disp-20 tablet, R-0, E-Prescribe      polyethylene glycol (MIRALAX) 17 GM/Dose powder Take 17 g by mouth 2 times daily Can increase up to 3times a day if no BM with BID dosing, Disp-510 g, R-0, E-Prescribe         CONTINUE these medications which have NOT CHANGED    Details   albuterol (PROVENTIL) (2.5 MG/3ML) 0.083% neb solution Take 2.5 mg by nebulization 2 times daily , Historical      carbamide peroxide (DEBROX) 6.5 % otic solution Place 5-10 drops into both ears every 30 days, Historical      cetirizine (ZYRTEC) 10 MG tablet Take 10 mg by mouth daily, Historical      cholecalciferol 25 MCG (1000 UT) TABS Take 1,000 Units by mouth daily, Historical      citalopram (CELEXA) 40 MG tablet Take 40 mg by mouth daily, Historical      diclofenac (VOLTAREN) 1 % topical gel Apply 1 g topically 2 times daily . To each knee, Historical      fluticasone (FLONASE) 50 MCG/ACT nasal spray Spray 2 sprays into both nostrils daily, Historical      LORazepam (ATIVAN) 0.5 MG tablet Take 0.5 mg by mouth 2 times daily as needed for agitation or anxiety, Historical      magnesium hydroxide (MILK OF MAGNESIA) 400 MG/5ML suspension Take 30 mLs by mouth daily, Historical      montelukast (SINGULAIR) 10 MG tablet Take 10 mg by mouth At Bedtime, Historical      OLANZapine (ZYPREXA) 2.5 MG tablet Take 2.5 mg by mouth At Bedtime, Historical      olopatadine (PATANOL) 0.1 % ophthalmic solution Place 1 drop Into the left eye 2 times daily, Historical      pantoprazole (PROTONIX) 20 MG EC tablet Take 20 mg by mouth 2 times daily, Historical      senna (SENOKOT) 8.6 MG tablet Take 2 tablets by mouth 2 times daily , Historical       sodium fluoride dental gel (PREVIDENT) 1.1 % GEL topical gel Apply to affected area At BedtimeHistorical      tiZANidine (ZANAFLEX) 4 MG tablet Take 4 mg by mouth 3 times daily, Historical         STOP taking these medications       KRISTALOSE 20 g packet Comments:   Reason for Stopping:                 Allergies:         Allergies   Allergen Reactions     Nutritional Supplements Nausea and Vomiting     increased vomiting when taking this.     Seasonal Allergies            Consultations This Hospital Stay:      No consultations were requested during this admission        Condition and Physical on Discharge:      Discharge condition: Stable   Vitals: Blood pressure (!) 97/35, pulse 85, temperature 98  F (36.7  C), temperature source Temporal, resp. rate 18, weight 45.7 kg (100 lb 11.2 oz), SpO2 97 %.   General: calm, answers questions by yes and no   HEENT:  Head nc/at temporal wasting is present, sclera clear PERRL  Lungs: cta b nl effort   CV:  RRR no m/r/g no le edema  Abd: flat moderately firm with no tenderness to palpation  Neuro:  unassessable   Calmly laying in bed  All extremities very thin with some contractures  Skin:  W/d no c/c              Discharge Time:      Less  than 30 minutes.        Image Results From This Hospital Stay (For Non-EPIC Providers):        Results for orders placed or performed during the hospital encounter of 07/05/22   XR Chest Port 1 View    Narrative    EXAM: XR CHEST PORT 1 VIEW  LOCATION: [facility]  DATE/TIME: 7/5/2022 8:27 PM    INDICATION: vomiting, eval aspiration  COMPARISON: 11/10/2022      Impression    IMPRESSION: Minimal change. Lung volumes are low but lungs appear grossly clear. Heart size and pulmonary vessels normal. Abundant intestinal gas persists.   CT Abdomen Pelvis w Contrast    Narrative    EXAM: CT ABDOMEN PELVIS W CONTRAST  LOCATION: [facility]  DATE/TIME: 7/5/2022 8:58 PM    INDICATION: abd pain, vomiting,  history of SBO  COMPARISON: 10/15/2018  TECHNIQUE: CT scan of the abdomen and pelvis was performed following injection of IV contrast. Multiplanar reformats were obtained. Dose reduction techniques were used.  CONTRAST: 45mL Isovue 370    FINDINGS:   LOWER CHEST: Modest fluid-filled hiatal hernia unchanged.    HEPATOBILIARY: Prior cholecystectomy.    PANCREAS: Normal.    SPLEEN: Normal.    ADRENAL GLANDS: Normal.    KIDNEYS/BLADDER: Normal.    BOWEL: No obstruction or inflammatory change. Large amount stool fills the rectum is distended up to 9 x 7.5 cm. No inflammatory bowel wall thickening. There is no definite obstruction with air filling the majority of bowel which appears normal in   caliber.    LYMPH NODES: Normal.    VASCULATURE: Unremarkable.    PELVIC ORGANS: Bladder is flattened and anteriorly displaced due to the film and rectum. Tiny air bubble within bladder presumably relates to catheterization.    MUSCULOSKELETAL: No suspicious lesion.      Impression    IMPRESSION:   1.  Large amount of stool fills and distends the rectum likely contributes to symptoms. Remainder of bowel within normal limits, no inflammatory bowel wall thickening.  2.  Bladder anteriorly displaced and flattened due to mass effect from the filled rectum.   XR Abdomen Port 1 View    Narrative    EXAM: XR ABDOMEN PORT 1 VIEWS  LOCATION: [facility]  DATE/TIME: 7/5/2022 11:57 PM    INDICATION: NGT placement  COMPARISON: Prior CT obtained earlier on 7/5/2022 user comparison      Impression    IMPRESSION: An NG tube is seen coiled in the expected position of the proximal stomach. Multiple air-filled loops of small and large bowel are again seen throughout the abdomen, similar to prior exam    XR Abdomen Port 1 View    Narrative    EXAM: XR ABDOMEN PORT 1 VIEWS  LOCATION: [facility]  DATE/TIME: 7/5/2022 11:57 PM    INDICATION: NG tube placement  COMPARISON: The abdominal x-ray obtained on  7/5/2022 at 2332 hours used for comparison      Impression    IMPRESSION: The NG tube is been retracted and the side-port is now the level of the GE junction, this should be repositioned and advanced approximately 5 to 10 cm for optimal positioning. Multiple air-filled loops of small and large bowel are again seen   throughout the abdomen, unchanged.            Most Recent Lab Results In EPIC (For Non-EPIC Providers):    Most Recent 3 CBC's:  Recent Labs   Lab Test 07/07/22  0555 07/05/22  1805 01/13/22  0840   WBC 3.9* 7.0 4.9   HGB 10.1* 11.5* 13.0*   MCV 89 85 98   * 188 135*      Most Recent 3 BMP's:  Recent Labs   Lab Test 07/07/22  0555 07/05/22  1805 01/13/22  0840    143 146*   POTASSIUM 3.6 4.0 3.9   CHLORIDE 109 106 116*   CO2 26 29 28   BUN 28 17 37*   CR 0.60* 0.72 0.60*   ANIONGAP 9 8 2*   ASTRID 8.2* 9.3 8.5   GLC 74 103* 99     Most Recent 3 Troponin's:  Recent Labs   Lab Test 08/26/21  0900   TROPONIN <0.015     Most Recent 3 INR's:  Recent Labs   Lab Test 01/10/22  0836   INR 1.01     Most Recent 2 LFT's:  Recent Labs   Lab Test 07/07/22 0555 07/05/22  1805   AST 24 95*   ALT 46 82*   ALKPHOS 103 108   BILITOTAL 0.5 0.3     Most Recent Cholesterol Panel:No lab results found.  Most Recent 6 Bacteria Isolates From Any Culture (See EPIC Reports for Culture Details):  Recent Labs   Lab Test 10/15/18  2214 10/15/18  2208   CULT No growth No growth     Most Recent TSH, T4 and HgbA1c: No lab results found.

## 2022-07-08 NOTE — PROGRESS NOTES
Care Management Discharge Note    Discharge Date: 07/08/2022       Discharge Disposition:  N/A    Discharge Services:  N/A    Discharge DME:  Wheelchair    Discharge Transportation: other (see comments) ([facility]ON[facility] Prieto)    Private pay costs discussed: Not applicable    PAS Confirmation Code:  N/A  Patient/family educated on Medicare website which has current facility and service quality ratings:  N/A    Education Provided on the Discharge Plan:    Persons Notified of Discharge Plans: HUC, RN, Group Home, Guardian   Patient/Family in Agreement with the Plan:      Handoff Referral Completed: No    Additional Information:    Group home called and confirmed that they can pick pt up with his wheelchair at 11:30 am 7/8/2022. Updated HUC and RN of discharge time. Orders have been faxed to the group home and medications have been sent to [facility] per group home request.     NILAM Park, [facility]  Inpatient Care Coordination  Ortho/Spine Unit  [phone]  ISH Park

## 2022-07-08 NOTE — PROGRESS NOTES
Patient discharging back to group home. Paperwork given to staff.    Patient tolerating pudding thick liquids. Removed IV. Provider was okay with leaving it out d/t discharge. No further bowel movements. 2 episodes of emesis. Scopolamine patch in place behind right ear.

## 2022-07-10 PROBLEM — R11.2 INTRACTABLE NAUSEA AND VOMITING: Status: ACTIVE | Noted: 2022-01-01

## 2022-07-10 NOTE — ED NOTES
[facility]  ED Nurse Handoff Report    Benito Marx is a 56 year old male   ED Chief complaint: Nausea & Vomiting  . ED Diagnosis:   Final diagnoses:   Intractable nausea and vomiting     Allergies:   Allergies   Allergen Reactions     Nutritional Supplements Nausea and Vomiting     increased vomiting when taking this.     Seasonal Allergies        Code Status: Full Code  Activity level - Baseline/Home:  Total Care. Activity Level - Current:   Total Care. Lift room needed: No. Bariatric: No   Needed: No   Isolation: No. Infection: Not Applicable.     Vital Signs:   Vitals:    07/10/22 1443   BP: 106/69   Pulse: 83   Resp: 20   SpO2: 93%       Cardiac Rhythm:  ,      Pain level:    Patient confused: Yes. Patient Falls Risk: Yes.   Elimination Status: Has voided, incontinent at baseline  Patient Report - Initial Complaint: nausea and vomiting. Focused Assessment: intermittent grunting and gagging noises heard, no emesis. Pt non-verbal at baseline.   Tests Performed:   Labs Ordered and Resulted from Time of ED Arrival to Time of ED Departure   COMPREHENSIVE METABOLIC PANEL - Abnormal       Result Value    Sodium 144      Potassium 3.9      Chloride 110 (*)     Carbon Dioxide (CO2) 30      Anion Gap 4      Urea Nitrogen 13      Creatinine 0.64 (*)     Calcium 9.1      Glucose 96      Alkaline Phosphatase 95      AST 13      ALT 28      Protein Total 6.7 (*)     Albumin 3.4      Bilirubin Total 0.2      GFR Estimate >90     CBC WITH PLATELETS AND DIFFERENTIAL - Abnormal    WBC Count 4.7      RBC Count 3.91 (*)     Hemoglobin 10.2 (*)     Hematocrit 33.9 (*)     MCV 87      MCH 26.1 (*)     MCHC 30.1 (*)     RDW 15.2 (*)     Platelet Count 162      % Neutrophils 71      % Lymphocytes 18      % Monocytes 8      % Eosinophils 3      % Basophils 0      % Immature Granulocytes 0      NRBCs per 100 WBC 0      Absolute Neutrophils 3.3      Absolute Lymphocytes 0.8      Absolute Monocytes 0.4       Absolute Eosinophils 0.2      Absolute Basophils 0.0      Absolute Immature Granulocytes 0.0      Absolute NRBCs 0.0     LIPASE - Normal    Lipase 127     COVID-19 VIRUS (CORONAVIRUS) BY PCR     XR Abdomen Port 1 View   Final Result   IMPRESSION: Small amount of stool. Nonspecific bowel gas pattern. Findings similar to previous.        Treatments provided: IV fluids  Family Comments: Legal guardians information in demographics  OBS brochure/video discussed/provided to patient:  N/A  ED Medications:   Medications   lactated ringers BOLUS 1,000 mL (0 mLs Intravenous Stopped 7/10/22 3102)     Drips infusing:  No  For the majority of the shift, the patient's behavior Yellow. Interventions performed were Pt continues to pull out IVs. Remains in bed without attempts to leave the bed. Non-ambulatory at baseline..    Sepsis treatment initiated: No     Patient tested for COVID 19 prior to admission: YES    ED Nurse Name/Phone Number: Lan Zamarripa RN,   5:34 PM  RECEIVING UNIT ED HANDOFF REVIEW    Above ED Nurse Handoff Report was reviewed: Yes  Reviewed by: Gilma Bryan RN on July 10, 2022 at 11:20 PM

## 2022-07-10 NOTE — H&P
[facility]    History and Physical - Hospitalist Service       Date of Admission:  7/10/2022    Assessment & Plan      Benito Marx is a 56 year old male with known history of cerebral palsy, partial quadriplegia, at baseline cannot stand to transfer but typically wheelchair-bound, dysphagia, on aspiration precautions with prior history of aspiration pneumonia, prior episodic bouts of nausea and vomiting, recent hospitalization for significant constipation that was reportedly improved with several reported BMs on last hospitalization, intellectual disability essentially nonverbal, malnutrition who presented back in the emergency room coming from his group home setting due to reported intolerance to oral diet with recurrent abdominal symptomatology of nausea and vomiting.    Problem list:    #1 recurrent symptomatology of nausea, vomiting with intolerance to oral intake  #2 recent hospitalization for above symptoms and constipation    -Admit as inpatient.  I am anticipating Benito will be requiring at least 2 inpatient stay  -He is a very poor historian.  No reported bleeding tendencies.  Stable chronic anemia.  -Check urine analysis for completion  -Most recent CT scan of the abdomen pelvis was pursued on last hospitalization last week that showed large amount of stooling that might be contributing to his overall symptoms  -On that stay reportedly his constipation has significantly improved  -Most recent abdominal x-ray showing minimal stools  -However patient's still having persistent symptoms of nausea and vomiting.  Requesting formal [place] gastroenterology input.  -Will await if patient will be a candidate for further work-up such as EGD.  -IV Pepcid  -Clear liquids.  N.p.o. after midnight until seen by GI  -As needed antiemetics.  Available Zofran, Compazine.  Also available lorazepam if no resolution of symptoms.    #3 history of cerebral palsy.  Partial quadriplegia  -Mostly  wheelchair-bound state  -Also has a intellectual disability, nonverbal.  Currently not following simple verbal instructions  -Challenging situation is patient keeps pulling out his IV access.  -He will be requiring IV access.  Will need IV fluid support, IV Pepcid and if pursuing procedure will need is for IV sedation.    #4 prior diagnosis of malnutrition secondary to chronic illness  -Nutrition/dietitian evaluation    #5 reportedly fully vaccinated for COVID-19    Formal social service evaluation for discharge planning disposition         Diet:  Clear liquid, n.p.o. after midnight  DVT Prophylaxis: Pneumatic Compression Devices  Luke Catheter: Not present  Central Lines: None  Cardiac Monitoring: None  Code Status:  Full code    Clinically Significant Risk Factors Present on Admission                          Disposition Plan    at least 2 days     The patient's care was discussed with the Patient and Emergency room physician.    Xavi Santana MD, MD  Hospitalist Service  [facility]  Securely message with the Vocera Web Console (learn more here)  Text page via [employer] Paging/Directory         ______________________________________________________________________    Chief Complaint   Nausea, vomiting not tolerating oral diet    Unable to obtain a history from the patient due to patient is a very poor historian with history of cerebral palsy, not following verbal instructions  Discussion with emergency room physician and review of electronic medical records    History of Present Illness   Benito Marx is a 56 year old male with known history of cerebral palsy, partial quadriplegia, at baseline cannot stand to transfer but typically wheelchair-bound, dysphagia, on aspiration precautions with prior history of aspiration pneumonia, prior episodic bouts of nausea and vomiting, recent hospitalization for significant constipation that was reportedly improved with several reported BMs on last  hospitalization, intellectual disability essentially nonverbal, malnutrition who presented back in the emergency room coming from his group home setting due to reported intolerance to oral diet with recurrent abdominal symptomatology of nausea and vomiting.  Patient is a poor historian.  There was no reports to the ED regarding fevers, diarrhea, bleeding tendencies, chills, coughing spells nor notable respiratory issues such as coughing or shortness of breath.   Upon evaluation in the emergency room he demonstrated stable hemodynamics, afebrile and was not hypoxic.  Initial work-up revealed stable anemia, no leukocytosis, normal platelets, electrolyte panel showed reassuring electrolytes levels, normal LFTs, normal lipase.  Imaging with abdominal x-ray revealed nonspecific bowel gas pattern similar to previous findings with small amount of stools.  Initially he was provided IV fluids and reportedly received 500 mL of IV fluids but patient pulled out his IV on several occasions.  Due to above recurrent symptomatology his case was referred to us for further evaluation and care hence this hospitalization.     Review of Systems    Cannot be obtained as patient is a very poor historian    Past Medical History    I have reviewed this patient's medical history and updated it with pertinent information if needed.   Past Medical History:   Diagnosis Date     Adjustment disorder with depressed mood      Cerebral palsy (H)      Constipated      GERD (gastroesophageal reflux disease)      History of aspiration pneumonia      Mental retardation, moderate (I.Q. 35-49)     prenatal hypoxia     Seizures (H)     LAST Sz 1994, No AEM as of 10.15.18       Past Surgical History   I have reviewed this patient's surgical history and updated it with pertinent information if needed.  Past Surgical History:   Procedure Laterality Date     CHOLECYSTECTOMY         Social History   I have reviewed this patient's social history and updated it  with pertinent information if needed.  Social History     Tobacco Use     Smoking status: Never Smoker     Smokeless tobacco: Never Used   Substance Use Topics     Alcohol use: Never     Drug use: Never       Family History   I have reviewed this patient's family history and updated it with pertinent information if needed.  Family History   Problem Relation Age of Onset     No Known Problems Mother      No Known Problems Father      No Known Problems Maternal Grandmother      No Known Problems Maternal Grandfather      No Known Problems Paternal Grandmother      No Known Problems Paternal Grandfather      No Known Problems Brother        Prior to Admission Medications   Prior to Admission Medications   Prescriptions Last Dose Informant Patient Reported? Taking?   LORazepam (ATIVAN) 0.5 MG tablet  at PRN  Yes Yes   Sig: Take 0.5 mg by mouth 2 times daily as needed for agitation or anxiety   OLANZapine (ZYPREXA) 2.5 MG tablet 7/9/2022 at Unknown time  Yes Yes   Sig: Take 2.5 mg by mouth At Bedtime   albuterol (PROVENTIL) (2.5 MG/3ML) 0.083% neb solution 7/10/2022 at x1  Yes Yes   Sig: Take 2.5 mg by nebulization 2 times daily    bisacodyl (DULCOLAX) 10 MG suppository  at PRN  No Yes   Sig: Place 1 suppository (10 mg) rectally daily as needed for constipation   carbamide peroxide (DEBROX) 6.5 % otic solution Past Week at Unknown time  Yes Yes   Sig: Place 5-10 drops into both ears every 30 days   cetirizine (ZYRTEC) 10 MG tablet 7/10/2022 at Unknown time  Yes Yes   Sig: Take 10 mg by mouth daily   cholecalciferol 25 MCG (1000 UT) TABS 7/10/2022 at Unknown time  Yes Yes   Sig: Take 1,000 Units by mouth daily   citalopram (CELEXA) 40 MG tablet 7/10/2022 at Unknown time  Yes Yes   Sig: Take 40 mg by mouth daily   diclofenac (VOLTAREN) 1 % topical gel 7/10/2022 at Unknown time  Yes Yes   Sig: Apply 1 g topically 2 times daily . To each knee   fluticasone (FLONASE) 50 MCG/ACT nasal spray 7/10/2022 at Unknown time  Yes Yes    Sig: Spray 2 sprays into both nostrils daily   magnesium hydroxide (MILK OF MAGNESIA) 400 MG/5ML suspension 7/10/2022 at 1400  Yes Yes   Sig: Take 30 mLs by mouth daily   montelukast (SINGULAIR) 10 MG tablet 7/9/2022 at Unknown time  Yes Yes   Sig: Take 10 mg by mouth At Bedtime   olopatadine (PATANOL) 0.1 % ophthalmic solution 7/10/2022 at x1  Yes Yes   Sig: Place 1 drop Into the left eye 2 times daily   ondansetron (ZOFRAN ODT) 4 MG ODT tab  at PRN  No Yes   Sig: Take 1 tablet (4 mg) by mouth every 4 hours as needed for nausea or vomiting   pantoprazole (PROTONIX) 20 MG EC tablet 7/10/2022 at x2  Yes Yes   Sig: Take 20 mg by mouth 2 times daily   polyethylene glycol (MIRALAX) 17 GM/Dose powder 7/10/2022 at x1  No Yes   Sig: Take 17 g by mouth 2 times daily Can increase up to 3times a day if no BM with BID dosing   senna (SENOKOT) 8.6 MG tablet 7/10/2022 at x2  Yes Yes   Sig: Take 2 tablets by mouth 2 times daily    sodium fluoride dental gel (PREVIDENT) 1.1 % GEL topical gel 7/9/2022 at Unknown time  Yes Yes   Sig: Apply to affected area At Bedtime   tiZANidine (ZANAFLEX) 4 MG tablet 7/10/2022 at x2  Yes Yes   Sig: Take 4 mg by mouth 3 times daily      Facility-Administered Medications: None     Allergies   Allergies   Allergen Reactions     Nutritional Supplements Nausea and Vomiting     increased vomiting when taking this.     Seasonal Allergies        Physical Exam   Vital Signs:     BP: 106/69 Pulse: 83   Resp: 20 SpO2: 93 %      Weight: 0 lbs 0 oz    Cachectic, weak appearing not in respiratory distress  Baseline partial quadriplegia  HEENT; Atraumatic, normocephalic, pinkish conjuctiva, pupils bilateral reactive   Skin: warm and moist, no rashes  Skin excoriations  Cachectic, lower extremities with contractures  Lungs: equal chest expansion, clear to auscultation, no wheezes, no stridor, no crackles,   Heart: normal rate, normal rhythm, no rubs or gallops.   Abdomen: normal bowel sounds, no tenderness, no  peritoneal signs, no guarding  Extremities: Contracted  Neuro; nonverbal, limited exam, does not follow verbal instructions, moves extremities   Psych; not combative        Data   Data reviewed today: I reviewed all medications, new labs and imaging results over the last 24 hours. I personally reviewed no images or EKG's today.    Recent Labs   Lab 07/10/22  1549 07/07/22  0555 07/05/22  1805   WBC 4.7 3.9* 7.0   HGB 10.2* 10.1* 11.5*   MCV 87 89 85    142* 188    144 143   POTASSIUM 3.9 3.6 4.0   CHLORIDE 110* 109 106   CO2 30 26 29   BUN 13 28 17   CR 0.64* 0.60* 0.72   ANIONGAP 4 9 8   ASTRID 9.1 8.2* 9.3   GLC 96 74 103*   ALBUMIN 3.4 3.0* 3.7   PROTTOTAL 6.7* 6.1* 7.6   BILITOTAL 0.2 0.5 0.3   ALKPHOS 95 103 108   ALT 28 46 82*   AST 13 24 95*   LIPASE 127  --  187     Most Recent 3 CBC's:Recent Labs   Lab Test 07/10/22  1549 07/07/22  0555 07/05/22  1805   WBC 4.7 3.9* 7.0   HGB 10.2* 10.1* 11.5*   MCV 87 89 85    142* 188     Most Recent 3 BMP's:Recent Labs   Lab Test 07/10/22  1549 07/07/22  0555 07/05/22  1805    144 143   POTASSIUM 3.9 3.6 4.0   CHLORIDE 110* 109 106   CO2 30 26 29   BUN 13 28 17   CR 0.64* 0.60* 0.72   ANIONGAP 4 9 8   ASTRID 9.1 8.2* 9.3   GLC 96 74 103*     Most Recent 2 LFT's:Recent Labs   Lab Test 07/10/22  1549 07/07/22  0555   AST 13 24   ALT 28 46   ALKPHOS 95 103   BILITOTAL 0.2 0.5     Most Recent 3 Troponin's:Recent Labs   Lab Test 08/26/21  0900   TROPONIN <0.015     Most Recent 3 BNP's:No lab results found.  Most Recent TSH and T4:No lab results found.  Recent Results (from the past 24 hour(s))   XR Abdomen Port 1 View    Narrative    EXAM: XR ABDOMEN PORT 1 VIEWS  LOCATION: [facility]  DATE/TIME: 7/10/2022 3:10 PM    INDICATION: Intractable nausea, vomiting  COMPARISON: 7/5/2022      Impression    IMPRESSION: Small amount of stool. Nonspecific bowel gas pattern. Findings similar to previous.

## 2022-07-10 NOTE — ED NOTES
Pt removed his second IV despite attempts to secure the IV. LR on hold due to lose of IV access. Pt continues to make occasional grunting noises but no active vomiting noted at this time.

## 2022-07-10 NOTE — ED PROVIDER NOTES
History   Chief Complaint:  Nausea & Vomiting       History limited by: Patient nonverbal       Benito Marx is a 56 year old male with history of cerebral palsy with partial quadriplegia and dysphagia who presents via EMS with nausea and vomiting. He admitted on 7/6 for intractable vomiting and fecal impaction. He was discharged on 7/8 but had continuing episodes of nausea and vomiting so he was brought back to the ED.    Review of Systems   Unable to perform ROS: Patient nonverbal   Gastrointestinal: Positive for nausea and vomiting.       Allergies:  Nutritional Supplements  Seasonal Allergies    Medications:  cetirizine   citalopram   diclofenac   Lorazepam  montelukast  Olanzapine   pantoprazole  polyethylene glycol  senna   Tizanidine     Past Medical History:     Quadriplegic cerebral palsy  Adjustment disorder with depressed mood  Seizure  Infection due to 2019 novel coronavirus  Acute respiratory failure with hypoxia  Fecal impaction  Meconium aspiration pneumonia  Congenital hemiplegia     Past Surgical History:    Cholecystectomy   Appendectomy    Social History:  Presents to the ED alone via EMS  Lives in a group home  Nonverbal     Physical Exam     Patient Vitals for the past 24 hrs:   BP Pulse Resp SpO2   07/10/22 1443 106/69 83 20 93 %       Physical Exam  Eyes:  Conjunctivae are normal  ENT:    The nose is normal    Pinnae are normal  Neck:  Trachea midline  CV:  Normal rate  Resp:  No respiratory distress   GI:  Soft, nontender, nondistended.  Musc:  Cerebral palsy, increased tone.  Skin:  No rash or lesions noted  Neuro: Speech is normal and fluent. Face is symmetric. Moving all extremities well.   Psych:  Awake. Alert.  Normal affect.  Appropriate interactions.            Emergency Department Course   Imaging:  XR Abdomen Port 1 View   Final Result   IMPRESSION: Small amount of stool. Nonspecific bowel gas pattern. Findings similar to previous.        Report per radiology    Laboratory:  Labs  Ordered and Resulted from Time of ED Arrival to Time of ED Departure   COMPREHENSIVE METABOLIC PANEL - Abnormal       Result Value    Sodium 144      Potassium 3.9      Chloride 110 (*)     Carbon Dioxide (CO2) 30      Anion Gap 4      Urea Nitrogen 13      Creatinine 0.64 (*)     Calcium 9.1      Glucose 96      Alkaline Phosphatase 95      AST 13      ALT 28      Protein Total 6.7 (*)     Albumin 3.4      Bilirubin Total 0.2      GFR Estimate >90     CBC WITH PLATELETS AND DIFFERENTIAL - Abnormal    WBC Count 4.7      RBC Count 3.91 (*)     Hemoglobin 10.2 (*)     Hematocrit 33.9 (*)     MCV 87      MCH 26.1 (*)     MCHC 30.1 (*)     RDW 15.2 (*)     Platelet Count 162      % Neutrophils 71      % Lymphocytes 18      % Monocytes 8      % Eosinophils 3      % Basophils 0      % Immature Granulocytes 0      NRBCs per 100 WBC 0      Absolute Neutrophils 3.3      Absolute Lymphocytes 0.8      Absolute Monocytes 0.4      Absolute Eosinophils 0.2      Absolute Basophils 0.0      Absolute Immature Granulocytes 0.0      Absolute NRBCs 0.0     LIPASE - Normal    Lipase 127     COVID-19 VIRUS (CORONAVIRUS) BY PCR       Emergency Department Course:    Reviewed:  I reviewed nursing notes, vitals and past medical history    Assessments:  1450 I obtained history and examined the patient as noted above.     Consults:  1917 I spoke with Dr. Santana of the hospitalist service who is in agreement to accept the patient for admission.     Interventions:  1549    Lactated ringers BOLUS, 1,000 mL, IV     Disposition:  The patient was admitted to the hospital under the care of Dr. Santana.     Impression & Plan     Medical Decision Making:    Patient presents with recurrent nausea and vomiting.  Patient was recently admitted to the hospital with similar etiology.  At time, thought that symptoms were most likely due to constipation, but that appears improved now.  Patient has had intermittent similar problems in the past, but I do not  see any documentation where it has been this prolonged. Broad differential considered including ileus, bowel obstruction, obstipation, constipation.  Labs unremarkable.  500 cc of fluids given before patient pulled out his IV x2.  At this time, we will plan on readmitting patient for further evaluation.  Dr. Santana accepts for admission.    Diagnosis:    ICD-10-CM    1. Intractable nausea and vomiting  R11.2        Scribe Disclosure:  I, Napoleon Mares, am serving as a scribe at 2:50 PM on 7/10/2022 to document services personally performed by Cain Peacock MD based on my observations and the provider's statements to me.            Cain Peacock MD  07/10/22 6471

## 2022-07-10 NOTE — ED TRIAGE NOTES
Came in via EMS from . Was recently admitted for SBO. discharge and had returning n/v episodes so brought back in.     Triage Assessment     Row Name 07/10/22 1444       Triage Assessment (Adult)    Airway WDL WDL       Respiratory WDL    Respiratory WDL WDL       Skin Circulation/Temperature WDL    Skin Circulation/Temperature WDL WDL       Cardiac WDL    Cardiac WDL WDL

## 2022-07-10 NOTE — PHARMACY-ADMISSION MEDICATION HISTORY
Admission medication history interview status for this patient is complete. See [facility] admission navigator for allergy information, prior to admission medications and immunization status.     Medication history interview done, indicate source(s): Caregiver from   Medication history resources (including written lists, pill bottles, clinic record):SureScripts, Care Everywhere, previous MR note      Changes made to PTA medication list:  Added: none  Changed: none  Reported as Not Taking: none  Removed: none    Actions taken by pharmacist (provider contacted, etc):sticky note to provider     Additional medication history information:None    Medication reconciliation/reorder completed by provider prior to medication history?  N    Prior to Admission medications    Medication Sig Last Dose Taking? Auth Provider Long Term End Date   albuterol (PROVENTIL) (2.5 MG/3ML) 0.083% neb solution Take 2.5 mg by nebulization 2 times daily  7/10/2022 at x1 Yes Reported, Patient Yes    bisacodyl (DULCOLAX) 10 MG suppository Place 1 suppository (10 mg) rectally daily as needed for constipation  at PRN Yes Deloris Gomez MD     carbamide peroxide (DEBROX) 6.5 % otic solution Place 5-10 drops into both ears every 30 days Past Week at Unknown time Yes Unknown, Entered By History     cetirizine (ZYRTEC) 10 MG tablet Take 10 mg by mouth daily 7/10/2022 at Unknown time Yes Reported, Patient     cholecalciferol 25 MCG (1000 UT) TABS Take 1,000 Units by mouth daily 7/10/2022 at Unknown time Yes Reported, Patient     citalopram (CELEXA) 40 MG tablet Take 40 mg by mouth daily 7/10/2022 at Unknown time Yes Reported, Patient Yes    diclofenac (VOLTAREN) 1 % topical gel Apply 1 g topically 2 times daily . To each knee 7/10/2022 at Unknown time Yes Unknown, Entered By History     fluticasone (FLONASE) 50 MCG/ACT nasal spray Spray 2 sprays into both nostrils daily 7/10/2022 at Unknown time Yes Reported, Patient     LORazepam (ATIVAN) 0.5 MG tablet  Take 0.5 mg by mouth 2 times daily as needed for agitation or anxiety  at PRN Yes Unknown, Entered By History     magnesium hydroxide (MILK OF MAGNESIA) 400 MG/5ML suspension Take 30 mLs by mouth daily 7/10/2022 at 1400 Yes Reported, Patient     montelukast (SINGULAIR) 10 MG tablet Take 10 mg by mouth At Bedtime 7/9/2022 at Unknown time Yes Unknown, Entered By History Yes    OLANZapine (ZYPREXA) 2.5 MG tablet Take 2.5 mg by mouth At Bedtime 7/9/2022 at Unknown time Yes Unknown, Entered By History Yes    olopatadine (PATANOL) 0.1 % ophthalmic solution Place 1 drop Into the left eye 2 times daily 7/10/2022 at x1 Yes Unknown, Entered By History     ondansetron (ZOFRAN ODT) 4 MG ODT tab Take 1 tablet (4 mg) by mouth every 4 hours as needed for nausea or vomiting  at PRN Yes Deloris Gomez MD     pantoprazole (PROTONIX) 20 MG EC tablet Take 20 mg by mouth 2 times daily 7/10/2022 at x2 Yes Reported, Patient     polyethylene glycol (MIRALAX) 17 GM/Dose powder Take 17 g by mouth 2 times daily Can increase up to 3times a day if no BM with BID dosing 7/10/2022 at x1 Yes Deloris Gomez MD     senna (SENOKOT) 8.6 MG tablet Take 2 tablets by mouth 2 times daily  7/10/2022 at x2 Yes Reported, Patient     sodium fluoride dental gel (PREVIDENT) 1.1 % GEL topical gel Apply to affected area At Bedtime 7/9/2022 at Unknown time Yes Unknown, Entered By History     tiZANidine (ZANAFLEX) 4 MG tablet Take 4 mg by mouth 3 times daily 7/10/2022 at x2 Yes Reported, Patient

## 2022-07-11 NOTE — PLAN OF CARE
End of Shift Summary  For vital signs and complete assessments, please see documentation flowsheets.     Pertinent assessments: Pt nonverbal, contracted, total cares. One episode of emesis,  zofran and compazine given. Tolerating puree diet with level 4 thickened liquids. NPO at midnight for EGD @ 12 noon. PT agitated and pulling at lines, mitts applied and PSC obtained. Large BM this shift.    Major Shift Events: pulled IV x2, new one started    Treatment Plan: IV protonix, prn zofran, GI consult.

## 2022-07-11 NOTE — PROGRESS NOTES
[facility]    Medicine Progress Note - Hospitalist Service    Date of Admission:  7/10/2022    Assessment & Plan               Benito Marx is a 56 year old male with known history of cerebral palsy, partial quadriplegia, at baseline cannot stand to transfer but typically wheelchair-bound, dysphagia, on aspiration precautions with prior history of aspiration pneumonia, prior episodic bouts of nausea and vomiting, recent hospitalization for significant constipation that was reportedly improved with several reported BMs on last hospitalization, intellectual disability essentially nonverbal, malnutrition who presented back in the emergency room coming from his group home setting due to reported intolerance to oral diet with recurrent abdominal symptomatology of nausea and vomiting.    Problem list:    #1 recurrent symptomatology of nausea, vomiting with intolerance to oral intake  #2 recent hospitalization for above symptoms and constipation    -Continue current care   -He is a very poor historian.  No reported bleeding tendencies.  Stable chronic anemia.  -Check urine analysis for completion  -Most recent CT scan of the abdomen pelvis was pursued on last hospitalization last week that showed large amount of stooling that might be contributing to his overall symptoms  -On that stay reportedly his constipation has significantly improved  -Most recent abdominal x-ray showing minimal stools  -However patient's still having persistent symptoms of nausea and vomiting.  Requesting formal [place] gastroenterology input.  -Will await if patient will be a candidate for further work-up such as EGD.  -IV Pepcid  -Clear liquids.  N.p.o. after midnight until seen by GI  -As needed antiemetics.  Available Zofran, Compazine.  Also available lorazepam if no resolution of symptoms.    #3 history of cerebral palsy.  Partial quadriplegia  -Mostly wheelchair-bound state  -Also has a intellectual disability,  nonverbal.  Currently not following simple verbal instructions  -Challenging situation is patient keeps pulling out his IV access.  -He will be requiring IV access.  Will need IV fluid support, IV Pepcid and if pursuing procedure will need is for IV sedation.    #4 prior diagnosis of malnutrition secondary to chronic illness  -Nutrition/dietitian evaluation    #5 reportedly fully vaccinated for COVID-19    Formal social service evaluation for discharge planning disposition           Diet: NPO for Medical/Clinical Reasons Except for: Ice Chips, Meds    DVT Prophylaxis: Pneumatic Compression Devices  Luke Catheter: Not present  Central Lines: None  Cardiac Monitoring: None  Code Status: Full Code      Disposition Plan      Expected Discharge Date: 07/12/2022                The patient's care was discussed with the Patient, attempting to contact patient's guardian    I called the a work number for patient supposed to be listed guardian namely a Guardian [employer] of [place].  I was informed by the person who answered the phone that business never took off.  I left a voicemail on the mobile phone listed on patient's chart    Al Trevor Santana MD, MD  Hospitalist Service  [facility]  Securely message with the Vocera Web Console (learn more here)  Text page via [facility] Paging/Directory         Clinically Significant Risk Factors Present on Admission          # Hypocalcemia: Ca = 8.2 mg/dL (Ref range: 8.5 - 10.1 mg/dL) and/or iCa = N/A on admission, will replace as needed                 ______________________________________________________________________    Interval History   He remained nonverbal.  Reportedly still having nausea but no witnessed vomiting  Afebrile.  Stable hemodynamics    Data reviewed today: I reviewed all medications, new labs and imaging results over the last 24 hours. I personally reviewed .    Physical Exam   Vital Signs: Temp: 98.2  F (36.8  C) Temp src: Axillary BP: 96/61  Pulse: 62   Resp: 16 SpO2: 95 % O2 Device: None (Room air)    Weight: 102 lbs 1.6 oz  HEENT; Atraumatic, normocephalic, pinkish conjuctiva, pupils bilateral reactive   Skin: warm and moist, no rashes  Lungs: equal chest expansion, clear to auscultation, no wheezes, no stridor, no crackles,   Heart: normal rate, normal rhythm, no rubs or gallops.   Abdomen: normal bowel sounds, no tenderness, no peritoneal signs, no guarding  Extremities: no deformities, no edema   Neuro; moves all extremities, nonverbal, contracted      Data   Recent Labs   Lab 07/11/22  0648 07/10/22  1549 07/07/22  0555 07/05/22  1805   WBC 4.4 4.7 3.9* 7.0   HGB 10.4* 10.2* 10.1* 11.5*   MCV 88 87 89 85   * 162 142* 188    144 144 143   POTASSIUM 4.2 3.9 3.6 4.0   CHLORIDE 110* 110* 109 106   CO2 29 30 26 29   BUN 14 13 28 17   CR 0.71 0.64* 0.60* 0.72   ANIONGAP 3 4 9 8   ASTRID 8.2* 9.1 8.2* 9.3   GLC 85 96 74 103*   ALBUMIN  --  3.4 3.0* 3.7   PROTTOTAL  --  6.7* 6.1* 7.6   BILITOTAL  --  0.2 0.5 0.3   ALKPHOS  --  95 103 108   ALT  --  28 46 82*   AST  --  13 24 95*   LIPASE  --  127  --  187     Recent Results (from the past 24 hour(s))   XR Abdomen Port 1 View    Narrative    EXAM: XR ABDOMEN PORT 1 VIEWS  LOCATION: [facility]  DATE/TIME: 7/10/2022 3:10 PM    INDICATION: Intractable nausea, vomiting  COMPARISON: 7/5/2022      Impression    IMPRESSION: Small amount of stool. Nonspecific bowel gas pattern. Findings similar to previous.     Medications     dextrose 5% and 0.45% NaCl + KCl 20 mEq/L 100 mL/hr at 07/11/22 0122       albuterol  2.5 mg Nebulization BID     cetirizine  10 mg Oral Daily     cholecalciferol  1,000 Units Oral Daily     diclofenac  1 g Topical BID     famotidine  20 mg Intravenous Q12H     fluticasone  2 spray Both Nostrils Daily     magnesium hydroxide  30 mL Oral Daily     montelukast  10 mg Oral At Bedtime     OLANZapine  2.5 mg Oral At Bedtime     olopatadine  1 drop Left Eye BID      polyethylene glycol  17 g Oral BID     sennosides  2 tablet Oral BID     Sod Fluoride-Potassium Nitrate   Dental Daily     sodium chloride (PF)  3 mL Intracatheter Q8H     tiZANidine  4 mg Oral TID

## 2022-07-11 NOTE — UTILIZATION REVIEW
Admission Status; Secondary Review Determination    Under the authority of the Utilization Management Committee, the utilization review process indicated a secondary review on the above patient. The review outcome is based on review of the medical records, discussions with staff, and applying clinical experience noted on the date of the review.    (x) Inpatient Status Appropriate - This patient's medical care is consistent with medical management for inpatient care and reasonable inpatient medical practice.    RATIONALE FOR DETERMINATION: 56-year-old male with known history of cerebral palsy, partial quadriplegia who is wheelchair-bound with only able to stand to help transfer, has dysphagia on aspiration precautions with history of aspiration pneumonia, history of episodic bouts of nausea and vomiting with apparent chronic significant intestinal air in the small bowel and colon as well as episodes of constipation, intellectual disability essentially nonverbal with significant recurrent malnutrition issues.  Patient hospitalized with refractive nausea and vomiting that probably was exacerbated by significant sigmoid/rectal constipation that appeared to have improved and discharged on 7/8/2022.  After returning to his group home patient has developed recurrent nausea and vomiting with inability to tolerate oral intake.  With patient's prior diagnosis of malnutrition, patient has significant increased risk of further deterioration and requiring hospitalization for IV fluid support, proton pump inhibitors as well as further evaluation of etiology of patient's recurrent nausea and vomiting.  Differential includes upper GI sources as well as obstipation/ileus.  Patient expected require greater than 2 nights in the hospital for ongoing management appropriate for inpatient care.    At the time of admission with the information available to the attending physician more than 2 nights Hospital complex care was  anticipated, based on patient risk of adverse outcome if treated as outpatient and complex care required. Inpatient admission is appropriate based on the Medicare guidelines.    This document was produced using voice recognition software    The information on this document is developed by the utilization review team in order for the business office to ensure compliance. This only denotes the appropriateness of proper admission status and does not reflect the quality of care rendered.    The definitions of Inpatient Status and Observation Status used in making the determination above are those provided in the CMS Coverage Manual, Chapter 1 and Chapter 6, section 70.4.    Sincerely,    Antonio Villanueva MD  Utilization Review  Physician Advisor  [facility].

## 2022-07-11 NOTE — PLAN OF CARE
Problem: Oral Intake Inadequate  Goal: Improved Oral Intake  Outcome: Ongoing, Progressing   Goal Outcome Evaluation:      Patient advanced from NPO to pureed diet with moderately thick liquids. Ordered a nutritionally adequate meal for lunch. No intake recorded yet.     Kathy Roberts RDN

## 2022-07-11 NOTE — PLAN OF CARE
End of Shift Summary  For vital signs and complete assessments, please see documentation flowsheets.     Pertinent assessments: Pt nonverbal, contracted, total cares, noted to have signs of nausea, no emesis,  zofran given with good relief.     Major Shift Events Admitted to unit    Treatment Plan: IV pepcid, prn zofran, GI consult.     Bedside Nurse: Gilma Bryan RN

## 2022-07-11 NOTE — ED NOTES
Pt unable to keep arm still for BP. BP cuff reinflating multiple times. Will continue to monitor and retake BP.

## 2022-07-11 NOTE — CONSULTS
GASTROENTEROLOGY CONSULTATION      Benito Marx  [facility]  [address] [facility] [address]  56 year old male     Admission Date/Time: 7/10/2022  Primary Care Provider: Pily Blackmon  Referring / Attending Physician:  Dr. Santana     We were asked to see the patient in consultation by Dr. Santana for evaluation of nausea and vomiting.         HPI:  Benito Marx is a 56 year old male with cerebral palsy (intellectual disability essentially nonverbal), partial quadriplegia, wheelchair-bound, dysphagia (on aspiration precautions with prior history of aspiration pneumonia), prior history of nausea and vomiting, with recent hospitalization for significant constipation, who presented back in the emergency room coming from his group home setting due to nausea and vomiting.     Patient is unable to give history.  All information was obtained from his medical record.  Patient has cerebral palsy and is nonverbal.  Last week he was hospitalized with nausea and vomiting found to have fairly significant stool burden in the rectum on imaging.  He received management for constipation and was discharged.  However he presents back to the hospital yesterday from his group home with nausea and vomiting.  An abdominal x-ray obtained in the ER only shows a small amount of retained stool.    Patient does struggle with dysphagia at baseline and is on a modified diet.  He has been receiving antiemetics.  Since admission no vomiting.  In the hospital he had been receiving famotidine. At his group home he receives pantoprazole 20 mg Bid.  His lab work is at his current baseline.     PAST MEDICAL HISTORY:  Patient Active Problem List    Diagnosis Date Noted     Intractable nausea and vomiting 07/10/2022     Priority: Medium     Fecal impaction (H) 07/05/2022     Priority: Medium     Intractable vomiting, presence of nausea not specified, unspecified vomiting type 07/05/2022     Priority: Medium     Acute respiratory  failure with hypoxia (H) 01/10/2022     Priority: Medium     Cerebral palsy, unspecified type (H) 01/10/2022     Priority: Medium     Infection due to 2019 novel coronavirus 01/10/2022     Priority: Medium     Aspiration pneumonia (H) 01/10/2022     Priority: Medium     Meconium aspiration pneumonia, unspecified laterality, unspecified part of lung 01/10/2022     Priority: Medium     Quadriplegic cerebral palsy (H) 01/09/2022     Priority: Medium     Gastroesophageal reflux disease 01/09/2022     Priority: Medium     Hiatal hernia 01/09/2022     Priority: Medium     Seizure (H) 01/09/2022     Priority: Medium     Moderate intellectual disability 01/09/2022     Priority: Medium     Oropharyngeal dysphagia 11/03/2017     Priority: Medium     Constipation 09/04/2013     Priority: Medium     Formatting of this note might be different from the original.  Severe on CT 8/2013       Urinary incontinence 09/17/2012     Priority: Medium     Episodic mood disorder (H) 10/12/2009     Priority: Medium     Adjustment disorder with depressed mood 06/23/2009     Priority: Medium     Formatting of this note might be different from the original.  Rule out Depression NOS       Congenital hemiplegia (H) 07/26/2007     Priority: Medium     Esophageal reflux 07/26/2007     Priority: Medium          ROS: A comprehensive ten point review of systems was negative aside from those in mentioned in the HPI.       MEDICATIONS:   Prior to Admission medications    Medication Sig Start Date End Date Taking? Authorizing Provider   albuterol (PROVENTIL) (2.5 MG/3ML) 0.083% neb solution Take 2.5 mg by nebulization 2 times daily  9/10/21  Yes Reported, Patient   bisacodyl (DULCOLAX) 10 MG suppository Place 1 suppository (10 mg) rectally daily as needed for constipation 7/8/22  Yes Deloris Gomez MD   carbamide peroxide (DEBROX) 6.5 % otic solution Place 5-10 drops into both ears every 30 days   Yes Unknown, Entered By History   cetirizine (ZYRTEC) 10  MG tablet Take 10 mg by mouth daily 8/22/21  Yes Reported, Patient   cholecalciferol 25 MCG (1000 UT) TABS Take 1,000 Units by mouth daily 8/17/21  Yes Reported, Patient   citalopram (CELEXA) 40 MG tablet Take 40 mg by mouth daily 8/19/21  Yes Reported, Patient   diclofenac (VOLTAREN) 1 % topical gel Apply 1 g topically 2 times daily . To each knee   Yes Unknown, Entered By History   fluticasone (FLONASE) 50 MCG/ACT nasal spray Spray 2 sprays into both nostrils daily 8/5/21  Yes Reported, Patient   LORazepam (ATIVAN) 0.5 MG tablet Take 0.5 mg by mouth 2 times daily as needed for agitation or anxiety   Yes Unknown, Entered By History   magnesium hydroxide (MILK OF MAGNESIA) 400 MG/5ML suspension Take 30 mLs by mouth daily 8/24/21  Yes Reported, Patient   montelukast (SINGULAIR) 10 MG tablet Take 10 mg by mouth At Bedtime   Yes Unknown, Entered By History   OLANZapine (ZYPREXA) 2.5 MG tablet Take 2.5 mg by mouth At Bedtime   Yes Unknown, Entered By History   olopatadine (PATANOL) 0.1 % ophthalmic solution Place 1 drop Into the left eye 2 times daily   Yes Unknown, Entered By History   ondansetron (ZOFRAN ODT) 4 MG ODT tab Take 1 tablet (4 mg) by mouth every 4 hours as needed for nausea or vomiting 7/8/22  Yes Deloris Gomez MD   pantoprazole (PROTONIX) 20 MG EC tablet Take 20 mg by mouth 2 times daily 7/20/21  Yes Reported, Patient   polyethylene glycol (MIRALAX) 17 GM/Dose powder Take 17 g by mouth 2 times daily Can increase up to 3times a day if no BM with BID dosing 7/8/22  Yes Deloris Gomez MD   senna (SENOKOT) 8.6 MG tablet Take 2 tablets by mouth 2 times daily    Yes Reported, Patient   sodium fluoride dental gel (PREVIDENT) 1.1 % GEL topical gel Apply to affected area At Bedtime   Yes Unknown, Entered By History   tiZANidine (ZANAFLEX) 4 MG tablet Take 4 mg by mouth 3 times daily 8/19/21  Yes Reported, Patient        ALLERGIES:   Allergies   Allergen Reactions     Nutritional Supplements Nausea and Vomiting      increased vomiting when taking this.     Seasonal Allergies         SOCIAL HISTORY:  Social History     Tobacco Use     Smoking status: Never Smoker     Smokeless tobacco: Never Used   Substance Use Topics     Alcohol use: Never     Drug use: Never        FAMILY HISTORY:  Family History   Problem Relation Age of Onset     No Known Problems Mother      No Known Problems Father      No Known Problems Maternal Grandmother      No Known Problems Maternal Grandfather      No Known Problems Paternal Grandmother      No Known Problems Paternal Grandfather      No Known Problems Brother         PHYSICAL EXAM:     BP 96/61 (BP Location: Left arm)   Pulse 62   Temp 98.2  F (36.8  C) (Axillary)   Resp 16   Wt 46.3 kg (102 lb 1.6 oz)   SpO2 95%   BMI 19.94 kg/m       PHYSICAL EXAM:  GENERAL:  Thin, middle age male with cerebral palsy   SKIN: no suspicious lesions, rashes, jaundice  HEAD: Normocephalic. Atraumatic.  NECK: Neck supple. No adenopathy.   EYES: No scleral icterus  GASTROINTESTINAL:  soft, non tender, non distended, no guarding/rebound  JOINT/EXTREMITIES: contracted  NEURO: CN 2-12 grossly intact  PSYCH: Normal affect          ADDITIONAL COMMENTS:   I reviewed the patient's new clinical lab test results.     Recent Labs   Lab 07/11/22  0648 07/10/22  1549 07/07/22  0555 07/05/22  1805   WBC 4.4 4.7 3.9* 7.0   RBC 4.01* 3.91* 3.81* 4.47   HGB 10.4* 10.2* 10.1* 11.5*   HCT 35.4* 33.9* 33.8* 38.0*   MCV 88 87 89 85   MCH 25.9* 26.1* 26.5 25.7*   MCHC 29.4* 30.1* 29.9* 30.3*   RDW 15.3* 15.2* 14.7 14.6   * 162 142* 188     Recent Labs   Lab Test 07/11/22  0648 07/10/22  1549 07/07/22  0555   POTASSIUM 4.2 3.9 3.6   CHLORIDE 110* 110* 109   CO2 29 30 26   BUN 14 13 28   ANIONGAP 3 4 9     Recent Labs   Lab Test 07/10/22  1549 07/07/22  0555 07/05/22 2013 07/05/22  1805 01/10/22  0117 10/15/18  2325 10/15/18  2158   ALBUMIN 3.4 3.0*  --  3.7   < >  --  3.7   BILITOTAL 0.2 0.5  --  0.3   < >  --  0.4   ALT 28  46  --  82*   < >  --  30   AST 13 24  --  95*   < >  --  34   PROTEIN  --   --  30 *  --   --  Negative  --    LIPASE 127  --   --  187  --   --  408*    < > = values in this interval not displayed.       Recent Labs   Lab 07/10/22  1549 07/05/22  1805   LIPASE 127 187        IMAGING / ENDOSCOPY       Recent Results (from the past 24 hour(s))   XR Abdomen Port 1 View    Narrative    EXAM: XR ABDOMEN PORT 1 VIEWS  LOCATION: [facility]  DATE/TIME: 7/10/2022 3:10 PM    INDICATION: Intractable nausea, vomiting  COMPARISON: 7/5/2022      Impression    IMPRESSION: Small amount of stool. Nonspecific bowel gas pattern. Findings similar to previous.         CONSULTATION ASSESSMENT AND PLAN:    Benito Marx is a 56 year old with cerebral palsy (intellectual disability essentially nonverbal), partial quadriplegia, wheelchair-bound, dysphagia (on aspiration precautions with prior history of aspiration pneumonia), prior history of nausea and vomiting, with recent hospitalization for significant constipation, who presented back in the emergency room coming from his group home setting due to nausea and vomiting.     1.  Nausea and vomiting: Resolved since being in the hospital.  However he has not restarted his dysphagia diet.  Suspicion for reflux esophagitis, pill esophagitis, gastroesophageal reflux, gastritis.  Less likely any biliary etiology.  Does not appear to have any current infection.  Constipation has been treated accordingly.     -- Would start PPI twice a day  -- May need to consider EGD in the OR tomorrow although he is an aspiration risk. Will discuss with Dr. Faith.   -- NPO at midnight.  -- Can restart home dysphagia diet. May need dietary consult given history of malnutrition.       I discussed the patient plan with Dr. Faith, GI staff physician. Thank you for asking us to participate in the care of this patient.    Approximately 40 min of total time was spent providing patient care,  including patient evaluation, reviewing documentation/ test results, and .     Radha Woods PA-C  [facility] ( [facility])

## 2022-07-11 NOTE — PROGRESS NOTES
"CLINICAL NUTRITION SERVICES - ASSESSMENT NOTE     Nutrition Prescription    RECOMMENDATIONS FOR MDs/PROVIDERS TO ORDER:  None    Malnutrition Status:    Does not meet criteria    Recommendations already ordered by Registered Dietitian (RD):  None at this time    Future/Additional Recommendations:  Monitor PO intake and tolerance     REASON FOR ASSESSMENT  Benito Marx is a/an 56 year old male assessed by the dietitian for Reason For Assessment: Provider order (malnutrition)    NUTRITION HISTORY  Nutrition/Diet History  Typical Intake (Food/Fluid/EN/PN): Patient is nonverbal and has cerebral palsy. Was previously on a clear liquid diet, then NPO, just changed to pureed diet with moderately thick liquids. Episodes of emesis and no BM. No intake reported yet. Patient has ordered a nutritionally adequate meal per [facility].  Factors Affecting Nutritional Intake: nausea, restricted diet (vomiting)    CURRENT NUTRITION ORDERS  Nutrition  Diet/Nutrition Received: pureed (level 4) (moderately thick liquids)  Intake (%):  (No intake recorded yet. Patient has ordered a nutritionally adequate meal for lunch per [facility])     LABS  Lab Results Reviewed: reviewed     MEDICATIONS  Pertinent Medications Reviewed: reviewed  Pertinent Medications Comments: Protonix, miralax(not given), senokot (not given)    ANTHROPOMETRICS  Height: 5'0\"  Most Recent Weight: 46.3 kg (102 lb 1.6 oz)    Body Mass Index (BMI)  BMI Assessment: BMI 18.5-24.9: normal   Weight for Calculation (kg): 46.3 kg (102 lb 1.2 oz)     ASSESSED NUTRITION NEEDS  Estimated Calorie Needs  Estimated Calorie Need Method: [place]  Estimated Calorie Requirement (kcal/day): [id number]  Kcal/k-30  KCAL/KG  Kcal/k-30  Estimated/Assessed Needs  Additional Documentation: Protein Requirements (Group)  Protein Requirements  Estimated/Assessed Protein Need (g/day): 37-46  Estimated/Assessed Protein Need (g/kg): 0.8-01  Fluid Requirements  RDA Method (mL): " 1150  Anitra-Segar Method (> 20 kg) (mL): 2426    PHYSICAL FINDINGS  See malnutrition section below.  Physical Findings  Overall Physical Appearance: Previous hospitalization for constipation. No BM reported during this admission.    MALNUTRITION  Final Malnutrition Summary  Malnutrition Criteria Met?: no  Status: initial    NUTRITION DIAGNOSIS  Nutrition Diagnosis: Inadequate oral intake related to acute illness as evidenced by emesis episodes and restricted diet orders(NPO, now pureed with moderately thick liquids).     INTERVENTIONS  Implementation  None at this time    Goals  Patient Goals:Nutrition Focus  Nutrition Goals:: PO  PO Goal:: PO >75%  PO goal status:: Ongoing, Progressing     Monitoring/Evaluation  Monitoring/Evaluation  Monitoring: Monitor patient per protocol    Kathy Roberts RDN

## 2022-07-11 NOTE — CONSULTS
Care Management Initial Consult    General Information  Assessment completed with: Care Team Member, Guardian and Rebecca Felton at   Type of CM/SW Visit: Initial Assessment    Primary Care Provider verified and updated as needed:     Readmission within the last 30 days:           Advance Care Planning:            Communication Assessment  Patient's communication style:  (Non-verbal. speak to guardian)    Hearing Difficulty or Deaf: no   Wear Glasses or Blind: no    Cognitive  Cognitive/Neuro/Behavioral: .WDL except  Level of Consciousness: alert  Arousal Level: opens eyes spontaneously  Orientation:  ([place])  Mood/Behavior: calm     Speech: unable to speak    Living Environment:   People in home: other (see comments) ( resident)     Current living Arrangements: group home      Able to return to prior arrangements: yes       Family/Social Support:  Care provided by: other (see comments) ( staff)  Provides care for: no one, unable/limited ability to care for self  Marital Status: Single  Facility resident(s)/Staff, Guardian          Description of Support System: Supportive, Involved    Support Assessment: Adequate family and caregiver support    Current Resources:   Patient receiving home care services:       Community Resources: Group Home, Transportation services  Equipment currently used at home: walker, standard  Supplies currently used at home:           Lifestyle & Psychosocial Needs:  Social Determinants of Health     Tobacco Use: Low Risk      Smoking Tobacco Use: Never Smoker     Smokeless Tobacco Use: Never Used   Alcohol Use: Not on file   Financial Resource Strain: Not on file   Food Insecurity: Not on file   Transportation Needs: Not on file   Physical Activity: Not on file   Stress: Not on file   Social Connections: Not on file   Intimate Partner Violence: Not on file   Depression: Not on file   Housing Stability: Not on file         Additional Information:    Spoke to pt guardisurya Palacios ([phone])  who stated that pt is independent in his wheelchair. Guardian gave verbal permission to call pt group home [facility] (P: [phone] F: [phone]) for more information.     Spoke with Rebecca BRAR RN ([phone]) who stated that they would be able to take pt back to the group home when medically ready and will provide transportation for him. Rebecca requested that someone notify her as soon as discharge date is known, they will bring a wheelchair for pt and transport in their wheelchair van. [facility] will take pt anytime before 8:00 pm. Rebecca stated that they would like all medications filled to [facility]. Per chart review of last visit,  helps pt with medication reminders, ADL's, and diet.     NILAM Park, SW  Inpatient Care Coordination  Ortho/Spine Unit    Lyndsey Gusman, SW

## 2022-07-12 NOTE — ANESTHESIA POSTPROCEDURE EVALUATION
Patient: Benito Marx    Procedure: Procedure(s):  ESOPHAGOGASTRODUODENOSCOPY (EGD)       Anesthesia Type:  MAC    Note:  Disposition: Outpatient   Postop Pain Control: Uneventful            Sign Out: Well controlled pain   PONV: No   Neuro/Psych: Uneventful            Sign Out: Acceptable/Baseline neuro status   Airway/Respiratory: Uneventful            Sign Out: Acceptable/Baseline resp. status   CV/Hemodynamics: Uneventful            Sign Out: Acceptable CV status   Other NRE: NONE   DID A NON-ROUTINE EVENT OCCUR? No           Last vitals:  Vitals Value Taken Time   BP 96/68 07/12/22 1315   Temp 98.3  F (36.8  C) 07/12/22 1305   Pulse 74 07/12/22 1317   Resp 13 07/12/22 1317   SpO2 93 % 07/12/22 1317   Vitals shown include unvalidated device data.    Electronically Signed By: Jessee Morelos MD  July 12, 2022  1:18 PM

## 2022-07-12 NOTE — PROGRESS NOTES
XC    Called for hypotension, SBP 70, down from 90s. Seen at bedside, he is non verbal and cannot give hx. Labs show relatively stable Hb and Crt and LA normal. Given 1L NS bolus and monitor for now. Spoke with RN.    Tyler Espinosa, DO     BP remained low after 1st bolus so was given a second liter and BP improved near 90. Continue maintenance IVF. No further orders.

## 2022-07-12 NOTE — PLAN OF CARE
SBP's in the 60-70's. MD paged. Ordered BMP, hgb, and lactic acid. Labs came back normal. Total of 2L NS bolus given with minimal improvements in BP. SBP in 70's with HR sustaining in the 40's. MD paged.     5:42 AM  Most recent BP 85/47, HR 48. MD called: he is ok with these vitals. Continue to monitor.

## 2022-07-12 NOTE — PLAN OF CARE
Pertinent assessments: Pt nonverbal, contracted, total cares. Tolerated puree diet with level 4 thickened liquids. NPO since midnight for EGD @ 12 noon. PT agitated and pulling at lines, mitts in place. Video monitored.     Major Shift Events: SBP 60-70's. MD paged. Labs and 2L bolus ordered. MD carballo with BP of 85/47 and HR 48. Continue to monitor.     Treatment Plan: IV protonix, prn zofran, GI consult

## 2022-07-12 NOTE — ADDENDUM NOTE
Addendum  created 07/12/22 1324 by Jessee Morelos MD    Order list changed, Order sets accessed

## 2022-07-12 NOTE — PROGRESS NOTES
[facility]    Medicine Progress Note - Hospitalist Service    Date of Admission:  7/10/2022    Assessment & Plan               Benito Marx is a 56 year old male with known history of cerebral palsy, partial quadriplegia, at baseline cannot stand to transfer but typically wheelchair-bound, dysphagia, on aspiration precautions with prior history of aspiration pneumonia, prior episodic bouts of nausea and vomiting, recent hospitalization for significant constipation that was reportedly improved with several reported BMs on last hospitalization, intellectual disability essentially nonverbal, malnutrition who presented back in the emergency room coming from his group home setting due to reported intolerance to oral diet with recurrent abdominal symptomatology of nausea and vomiting.    Problem list:    #1 recurrent symptomatology of nausea, vomiting with intolerance to oral intake  #2 recent hospitalization for above symptoms and constipation.  Assessment: on that stay reportedly his constipation has significantly improved. Most recent abdominal x-ray showing minimal stools. Most recent CT scan of the abdomen pelvis was pursued on last hospitalization last week that showed large amount of stooling that might be contributing to his overall symptoms. He is a very poor historian.  No reported bleeding tendencies.  Stable chronic anemia.  Plan:  - Continue current care  - [place] [facility] consulted -> EGD shows LA Grade D reflux esophagitis with no bleeding.  -IV PPI BID  -Clear liquids.   -As needed antiemetics.  Available Zofran, Compazine.  Also available lorazepam if no resolution of symptoms.    #3 history of cerebral palsy.  Partial quadriplegia  -Mostly wheelchair-bound state  -Also has a intellectual disability, nonverbal.  Currently not following simple verbal instructions  -Challenging situation as patient keeps pulling out his IV access.  - Formal social service evaluation for  discharge planning disposition    #4 prior diagnosis of malnutrition secondary to chronic illness  -Nutrition/dietitian evaluation    #5 reportedly fully vaccinated for COVID-19       Diet: Clear Liquid Diet    DVT Prophylaxis: Pneumatic Compression Devices  Luke Catheter: Not present  Central Lines: None  Cardiac Monitoring: None  Code Status: Full Code      Disposition Plan      Expected Discharge Date: 07/13/2022                The patient's care was discussed with the Patient, attempting to contact patient's guardian    I called the a work number for patient supposed to be listed guardian namely a Guardian [employer] [place].  I was informed by the person who answered the phone that business never took off.  I left a voicemail on the mobile phone listed on patient's chart    Ghanshyam Brown MD  Hospitalist Service  [facility]  Securely message with the Vocera Web Console (learn more here)  Text page via [employer] Paging/Directory         Clinically Significant Risk Factors Present on Admission                      ______________________________________________________________________    Interval History      Doing well post EGD  Mitts on   He remained nonverbal.    Afebrile.  Stable hemodynamics    Data reviewed today: I reviewed all medications, new labs and imaging results over the last 24 hours. I personally reviewed .    Physical Exam   Vital Signs: Temp: 98.4  F (36.9  C) Temp src: Axillary BP: 136/63 Pulse: 59   Resp: 14 SpO2: 98 % O2 Device: None (Room air)    Weight: 107 lbs 12.8 oz  HEENT; Atraumatic, normocephalic, pinkish conjuctiva, pupils bilateral reactive   Skin: warm and moist, no rashes  Lungs: equal chest expansion, clear to auscultation, no wheezes, no stridor, no crackles,   Heart: normal rate, normal rhythm, no rubs or gallops.   Abdomen: normal bowel sounds, no tenderness, no peritoneal signs, no guarding  Extremities: no deformities, no edema   Neuro; moves all  extremities, nonverbal, contracted      Data   Recent Labs   Lab 07/12/22  0726 07/12/22  0024 07/11/22  0648 07/10/22  1549 07/07/22  0555 07/05/22  1805   WBC  --   --  4.4 4.7 3.9* 7.0   HGB  --  9.1* 10.4* 10.2* 10.1* 11.5*   MCV  --   --  88 87 89 85   PLT  --   --  145* 162 142* 188   NA  --  139 142 144 144 143   POTASSIUM 4.3 4.0 4.2 3.9 3.6 4.0   CHLORIDE  --  110* 110* 110* 109 106   CO2  --  26 29 30 26 29   BUN  --  14 14 13 28 17   CR  --  0.70 0.71 0.64* 0.60* 0.72   ANIONGAP  --  3 3 4 9 8   ASTRID  --  7.8* 8.2* 9.1 8.2* 9.3   GLC  --  118* 85 96 74 103*   ALBUMIN  --   --   --  3.4 3.0* 3.7   PROTTOTAL  --   --   --  6.7* 6.1* 7.6   BILITOTAL  --   --   --  0.2 0.5 0.3   ALKPHOS  --   --   --  95 103 108   ALT  --   --   --  28 46 82*   AST  --   --   --  13 24 95*   LIPASE  --   --   --  127  --  187     No results found for this or any previous visit (from the past 24 hour(s)).  Medications     dextrose 5% and 0.45% NaCl + KCl 20 mEq/L 100 mL/hr at 07/11/22 2137       albuterol  2.5 mg Nebulization BID     cetirizine  10 mg Oral Daily     cholecalciferol  1,000 Units Oral Daily     diclofenac  1 g Topical BID     fluticasone  2 spray Both Nostrils Daily     magnesium hydroxide  30 mL Oral Daily     montelukast  10 mg Oral At Bedtime     OLANZapine  2.5 mg Oral At Bedtime     olopatadine  1 drop Left Eye BID     pantoprazole (PROTONIX) IV  40 mg Intravenous BID     polyethylene glycol  17 g Oral BID     sennosides  2 tablet Oral BID     Sod Fluoride-Potassium Nitrate   Dental Daily     sodium chloride (PF)  3 mL Intracatheter Q8H     tiZANidine  4 mg Oral TID

## 2022-07-12 NOTE — ANESTHESIA PREPROCEDURE EVALUATION
Anesthesia Pre-Procedure Evaluation    Patient: Benito Marx   MRN: [id number] : 1965        Procedure : Procedure(s):  ESOPHAGOGASTRODUODENOSCOPY (EGD)          Past Medical History:   Diagnosis Date     Adjustment disorder with depressed mood      Cerebral palsy (H)      Constipated      GERD (gastroesophageal reflux disease)      History of aspiration pneumonia      Mental retardation, moderate (I.Q. 35-49)     prenatal hypoxia     Seizures (H)     LAST 1994, No AEM as of 10.15.18      Past Surgical History:   Procedure Laterality Date     CHOLECYSTECTOMY        Allergies   Allergen Reactions     Nutritional Supplements Nausea and Vomiting     increased vomiting when taking this.     Seasonal Allergies       Social History     Tobacco Use     Smoking status: Never Smoker     Smokeless tobacco: Never Used   Substance Use Topics     Alcohol use: Never      Wt Readings from Last 1 Encounters:   22 48.9 kg (107 lb 12.8 oz)        Anesthesia Evaluation            ROS/MED HX  ENT/Pulmonary:  - neg pulmonary ROS     Neurologic: Comment: Cerebral palsy    (+) Developmental delay,     Cardiovascular:  - neg cardiovascular ROS     METS/Exercise Tolerance:     Hematologic:  - neg hematologic  ROS     Musculoskeletal:  - neg musculoskeletal ROS     GI/Hepatic:     (+) GERD, hiatal hernia,     Renal/Genitourinary:  - neg Renal ROS     Endo:  - neg endo ROS     Psychiatric/Substance Use:  - neg psychiatric ROS     Infectious Disease:  - neg infectious disease ROS     Malignancy:       Other:            Physical Exam    Airway   unable to assess          Respiratory Devices and Support         Dental    unable to assess        Cardiovascular   cardiovascular exam normal          Pulmonary   pulmonary exam normal                OUTSIDE LABS:  CBC:   Lab Results   Component Value Date    WBC 4.4 2022    WBC 4.7 07/10/2022    HGB 9.1 (L) 2022    HGB 10.4 (L) 2022    HCT 35.4 (L) 2022     HCT 33.9 (L) 07/10/2022     (L) 07/11/2022     07/10/2022     BMP:   Lab Results   Component Value Date     07/12/2022     07/11/2022    POTASSIUM 4.3 07/12/2022    POTASSIUM 4.0 07/12/2022    CHLORIDE 110 (H) 07/12/2022    CHLORIDE 110 (H) 07/11/2022    CO2 26 07/12/2022    CO2 29 07/11/2022    BUN 14 07/12/2022    BUN 14 07/11/2022    CR 0.70 07/12/2022    CR 0.71 07/11/2022     (H) 07/12/2022    GLC 85 07/11/2022     COAGS:   Lab Results   Component Value Date    PTT 42 (H) 01/10/2022    INR 1.01 01/10/2022    FIBR 346 01/13/2022     POC: No results found for: BGM, HCG, HCGS  HEPATIC:   Lab Results   Component Value Date    ALBUMIN 3.4 07/10/2022    PROTTOTAL 6.7 (L) 07/10/2022    ALT 28 07/10/2022    AST 13 07/10/2022    ALKPHOS 95 07/10/2022    BILITOTAL 0.2 07/10/2022     OTHER:   Lab Results   Component Value Date    LACT 1.1 07/12/2022    ASTRID 7.8 (L) 07/12/2022    MAG 2.3 07/12/2022    LIPASE 127 07/10/2022    CRP 6.1 01/13/2022       Anesthesia Plan    ASA Status:  4      Anesthesia Type: MAC.     - Reason for MAC: straight local not clinically adequate              Consents    Anesthesia Plan(s) and associated risks, benefits, and realistic alternatives discussed. Questions answered and patient/representative(s) expressed understanding.    - Discussed:     - Discussed with:  Legal Guardian      - Extended Intubation/Ventilatory Support Discussed: No.      - Patient is DNR/DNI Status: No    Use of blood products discussed: No .     Postoperative Care    Pain management: IV analgesics, Oral pain medications, Multi-modal analgesia.   PONV prophylaxis: Ondansetron (or other 5HT-3), Dexamethasone or Solumedrol     Comments:                Marcio Connors MD

## 2022-07-12 NOTE — ANESTHESIA CARE TRANSFER NOTE
Patient: Benito Marx    Procedure: Procedure(s):  ESOPHAGOGASTRODUODENOSCOPY (EGD)       Diagnosis: N&V (nausea and vomiting) [R11.2]  Diagnosis Additional Information: No value filed.    Anesthesia Type:   MAC     Note:    Oropharynx: oropharynx clear of all foreign objects  Level of Consciousness: drowsy  Oxygen Supplementation: room air    Independent Airway: airway patency satisfactory and stable  Dentition: dentition unchanged  Vital Signs Stable: post-procedure vital signs reviewed and stable  Report to RN Given: handoff report given  Patient transferred to: PACU  Comments: Patient drowsy but responsive. To PACU on RA ventilating well. VSS. Report given.  Handoff Report: Identifed the Patient, Identified the Reponsible Provider, Reviewed the pertinent medical history, Discussed the surgical course, Reviewed Intra-OP anesthesia mangement and issues during anesthesia, Set expectations for post-procedure period and Allowed opportunity for questions and acknowledgement of understanding      Vitals:  Vitals Value Taken Time   BP 96/68 07/12/22 1305   Temp 98.3  F (36.8  C) 07/12/22 1305   Pulse 78 07/12/22 1305   Resp 14 07/12/22 1305   SpO2 94 % 07/12/22 1305   Vitals shown include unvalidated device data.    Electronically Signed By: BERYL Spear CRNA  July 12, 2022  1:07 PM

## 2022-07-13 NOTE — PROGRESS NOTES
GASTROENTEROLOGY PROGRESS NOTE        SUBJECTIVE:  No nausea or vomiting. Tolerating modified diet.      OBJECTIVE:    /80 (BP Location: Left arm)   Pulse 63   Temp 99.6  F (37.6  C) (Temporal)   Resp 22   Wt 46.8 kg (103 lb 3.2 oz)   SpO2 96%   BMI 20.15 kg/m    Temp (24hrs), Av.3  F (36.8  C), Min:97.6  F (36.4  C), Max:99.6  F (37.6  C)    Patient Vitals for the past 72 hrs:   Weight   22 0627 46.8 kg (103 lb 3.2 oz)   22 0511 48.9 kg (107 lb 12.8 oz)   22 0519 46.3 kg (102 lb 1.6 oz)       Intake/Output Summary (Last 24 hours) at 2022 0923  Last data filed at 2022 1354  Gross per 24 hour   Intake 300 ml   Output 1 ml   Net 299 ml        PHYSICAL EXAM     Constitutional: NAD, hand restraints   Abdomen: soft, thin, NTTP         Additional Comments:  ROS, FH, SH: See initial GI consult for details.     I have reviewed the patient's new clinical lab results:     Recent Labs   Lab Test 22  0024 2248 07/10/22  1549 22  0555 22  0843 01/10/22  0836   WBC  --  4.4 4.7 3.9*   < >  --    HGB 9.1* 10.4* 10.2* 10.1*   < >  --    MCV  --  88 87 89   < >  --    PLT  --  145* 162 142*   < >  --    INR  --   --   --   --   --  1.01    < > = values in this interval not displayed.     Recent Labs   Lab Test 22  0726 22  0024 2248 07/10/22  154   POTASSIUM 4.3 4.0 4.2 3.9   CHLORIDE  --  110* 110* 110*   CO2  --  26 29 30   BUN  --  14 14 13   ANIONGAP  --  3 3 4     Recent Labs   Lab Test 07/10/22  1549 22  0555 22  20  1805 01/10/22  0117 10/15/18  2325 10/15/18  2158   ALBUMIN 3.4 3.0*  --  3.7   < >  --  3.7   BILITOTAL 0.2 0.5  --  0.3   < >  --  0.4   ALT 28 46  --  82*   < >  --  30   AST 13 24  --  95*   < >  --  34   PROTEIN  --   --  30 *  --   --  Negative  --    LIPASE 127  --   --  187  --   --  408*    < > = values in this interval not displayed.       ENDOSCOPY     EGD  Impression:          - LA  Grade D reflux esophagitis with no bleeding.                             Biopsied.                             - Z-line, 34 cm from the incisors.                             - Large hiatal hernia.                             - Normal mucosa was found in the entire stomach.                             Biopsied.                             - Normal examined duodenum. Biopsied.       ASSESSMENT/ PLAN  Benito Marx is a 56 year old with cerebral palsy (intellectual disability essentially nonverbal), partial quadriplegia, wheelchair-bound, dysphagia (on aspiration precautions with prior history of aspiration pneumonia), prior history of nausea and vomiting, with recent hospitalization for significant constipation, who presented back in the emergency room coming from his group home setting due to nausea and vomiting.      1.  Nausea and vomiting: Improved, tolerating diet. EGD yesterday with findings of LA grade D severe esophagitis and a large hiatal hernia.     -- Pending gastric and esophageal biopsies.  -- Continue Pantoprazole 40 mg twice a day  -- Continue home dysphagia diet.   --  Repeat EGD in 8 weeks to document healing, my office will schedule.       Discussed with Dr. Faith. Will no longer follow. Please call with questions or change in condition.    Radha Woods PA-C  [facility] [facility] ( [facility])

## 2022-07-13 NOTE — PROGRESS NOTES
[facility] [place]    Medicine Progress Note - Hospitalist Service    Date of Admission:  7/10/2022  Date of Service: 07/13/2022    Assessment & Plan          Benito Marx is a 56 year old male with known history of cerebral palsy, partial quadriplegia, at baseline cannot stand to transfer but typically wheelchair-bound, dysphagia, on aspiration precautions with prior history of aspiration pneumonia, prior episodic bouts of nausea and vomiting, recent hospitalization for significant constipation that was reportedly improved with several reported BMs on last hospitalization, intellectual disability essentially nonverbal, malnutrition who presented back in the emergency room coming from his group home setting due to reported intolerance to oral diet with recurrent abdominal symptomatology of nausea and vomiting.    Problem list:    #1 recurrent symptomatology of nausea, vomiting with intolerance to oral intake  #2 recent hospitalization for above symptoms and constipation.  Assessment: on that stay reportedly his constipation has significantly improved. Most recent abdominal x-ray showing minimal stools. Most recent CT scan of the abdomen pelvis was pursued on last hospitalization last week that showed large amount of stooling that might be contributing to his overall symptoms. He is a very poor historian.  No reported bleeding tendencies.  Stable chronic anemia.  Plan:  - Continue current care  - [place] [facility] consulted -> EGD shows LA Grade D reflux esophagitis with no bleeding.  -IV PPI BID  -Slowly advanced diet and monitor symptoms  -As needed antiemetics.  Available Zofran, Compazine.  Also available lorazepam if no resolution of symptoms.    #3 history of cerebral palsy.  Partial quadriplegia  -Mostly wheelchair-bound state  -Also has a intellectual disability, nonverbal.  Currently not following simple verbal instructions  -Challenging situation as patient keeps pulling out his IV  access.  - Formal social service evaluation for discharge planning disposition    #4 prior diagnosis of malnutrition secondary to chronic illness  -Nutrition/dietitian evaluation    #5 reportedly fully vaccinated for COVID-19       Diet: Pureed Diet (level 4) Extremely Thick (level 4)    DVT Prophylaxis: Pneumatic Compression Devices  Luke Catheter: Not present  Central Lines: None  Cardiac Monitoring: None  Code Status: Full Code      Disposition Plan      Expected Discharge Date: 07/16/2022                The patient's care was discussed with the Patient, attempting to contact patient's guardian    I called the a work number for patient supposed to be listed guardian namely a Guardian [employer] of [place].  I was informed by the person who answered the phone that business never took off.  I left a voicemail on the mobile phone listed on patient's chart    Ghanshyam Brown MD  Hospitalist Service  [facility]  Securely message with the Vocera Web Console (learn more here)  Text page via [email or web address] Paging/Directory         Clinically Significant Risk Factors Present on Admission                      ______________________________________________________________________    Interval History      No acute events overnight  Seems to be tolerating modified diet  He remained nonverbal.    Afebrile.  Stable hemodynamics    Data reviewed today: I reviewed all medications, new labs and imaging results over the last 24 hours. I personally reviewed .    Physical Exam   Vital Signs: Temp: 99.7  F (37.6  C) Temp src: Temporal BP: 106/51 Pulse: 88   Resp: 22 SpO2: 95 % O2 Device: None (Room air)    Weight: 103 lbs 3.2 oz  HEENT; Atraumatic, normocephalic, pinkish conjuctiva, pupils bilateral reactive   Skin: warm and moist, no rashes  Lungs: equal chest expansion, clear to auscultation, no wheezes, no stridor, no crackles,   Heart: normal rate, normal rhythm, no rubs or gallops.   Abdomen: normal bowel sounds, no  tenderness, no peritoneal signs, no guarding  Extremities: no deformities, no edema   Neuro; moves all extremities, nonverbal, contracted    Data   Recent Labs   Lab 07/12/22  0726 07/12/22  0024 07/11/22  0648 07/10/22  1549 07/07/22  0555   WBC  --   --  4.4 4.7 3.9*   HGB  --  9.1* 10.4* 10.2* 10.1*   MCV  --   --  88 87 89   PLT  --   --  145* 162 142*   NA  --  139 142 144 144   POTASSIUM 4.3 4.0 4.2 3.9 3.6   CHLORIDE  --  110* 110* 110* 109   CO2  --  26 29 30 26   BUN  --  14 14 13 28   CR  --  0.70 0.71 0.64* 0.60*   ANIONGAP  --  3 3 4 9   ASTRID  --  7.8* 8.2* 9.1 8.2*   GLC  --  118* 85 96 74   ALBUMIN  --   --   --  3.4 3.0*   PROTTOTAL  --   --   --  6.7* 6.1*   BILITOTAL  --   --   --  0.2 0.5   ALKPHOS  --   --   --  95 103   ALT  --   --   --  28 46   AST  --   --   --  13 24   LIPASE  --   --   --  127  --      No results found for this or any previous visit (from the past 24 hour(s)).  Medications     dextrose 5% and 0.45% NaCl + KCl 20 mEq/L 100 mL/hr at 07/13/22 0443       albuterol  2.5 mg Nebulization BID     cetirizine  10 mg Oral Daily     diclofenac  1 g Topical BID     fluticasone  2 spray Both Nostrils Daily     magnesium hydroxide  30 mL Oral Daily     montelukast  10 mg Oral At Bedtime     OLANZapine  2.5 mg Oral At Bedtime     olopatadine  1 drop Left Eye BID     pantoprazole (PROTONIX) IV  40 mg Intravenous BID     polyethylene glycol  17 g Oral BID     sennosides  2 tablet Oral BID     Sod Fluoride-Potassium Nitrate   Dental Daily     sodium chloride (PF)  3 mL Intracatheter Q8H     tiZANidine  4 mg Oral TID     Vitamin D3  1,000 Units Oral Daily

## 2022-07-13 NOTE — PLAN OF CARE
Goal Outcome Evaluation:    To Do:  End of Shift Summary  For vital signs and complete assessments, please see documentation flowsheets.     Pertinent assessments: Non-verbal. Restless. Appeared to rest and calm after receiving IV zofran and protonix. Given PRN compazine and ativan for restlessness and nausea. Incontinent of urine. BS active. Infrequent cough.     Major Shift Events: EGD completed.    Treatment Plan: IV protonix and nausea management.    Bedside Nurse: Jazmyn Gilbert RN

## 2022-07-13 NOTE — PLAN OF CARE
End of Shift Summary  For vital signs and complete assessments, please see documentation flowsheets.     Pertinent assessments: Non-verbal. Restless, improved after tylenol for pain. Incontinent of urine. BS active. Infrequent congested cough.     Major Shift Events: none    Treatment Plan: IV protonix, nausea management, GI consult, await biopsy results

## 2022-07-13 NOTE — PROGRESS NOTES
End of Shift Summary  For vital signs and complete assessments, please see documentation flowsheets.     Pertinent assessments: Non-verbal.  LS clear, Incontinent of B&B. BS active. Infrequent congested cough. Assist of 2 with lift device. PIV infusing D5%, 0.45 NS + K20 at 100 mL/hr. No Nausea this shift. Sleeping comfortably in bed.    Major Shift Events: none    Treatment Plan: IV protonix, nausea management, GI consult    Bedside Nurse: Courtney Villarreal RN

## 2022-07-14 NOTE — PLAN OF CARE
Pertinent assessments: Non-verbal, Tylenol given x1 for pain and low grade temp. Zofran and ativan given x1 for nausea and restlessness. Infrequent congest cough. External catheter in place. Ax2 with lift.     Major Shift Events: uneventful    Treatment Plan: nausea management, IVF    Bedside Nurse: Chen Khan RN

## 2022-07-14 NOTE — PLAN OF CARE
To Do:  End of Shift Summary  For vital signs and complete assessments, please see documentation flowsheets.     Pertinent assessments: Non-verbal, Tylenol given for pain, Restless IV Ativan given and effective. LS coarse and diminished, has a freq congested cough. On IVF at 100ml/hr. Meds crushed in pudding. Ice cream given at HS. External cath in place. Soft mitts on.   Major Shift Events Low BP's received 1L NS bolus, labs drawn, lactic 1.1 and hgb 8.9. Bp came back up to 111/75.   Treatment Plan: IV protonix, nausea management, and GI consult.    Bedside Nurse: Araceli Barber RN

## 2022-07-14 NOTE — PROGRESS NOTES
[facility]    Medicine Progress Note - Hospitalist Service    Date of Admission:  7/10/2022  Date of Service: 07/14/2022    Assessment & Plan          Benito Marx is a 56 year old male with known history of cerebral palsy, partial quadriplegia, at baseline cannot stand to transfer but typically wheelchair-bound, dysphagia, on aspiration precautions with prior history of aspiration pneumonia, prior episodic bouts of nausea and vomiting, recent hospitalization for significant constipation that was reportedly improved with several reported BMs on last hospitalization, intellectual disability essentially nonverbal, malnutrition who presented back in the emergency room coming from his group home setting due to reported intolerance to oral diet with recurrent abdominal symptomatology of nausea and vomiting.    Problem list:    #1 recurrent symptomatology of nausea, vomiting with intolerance to oral intake  #2 recent hospitalization for above symptoms and constipation.  Assessment: on that stay reportedly his constipation has significantly improved. Most recent abdominal x-ray showing minimal stools. Most recent CT scan of the abdomen pelvis was pursued on last hospitalization last week that showed large amount of stooling that might be contributing to his overall symptoms. He is a very poor historian.  No reported bleeding tendencies.  Stable chronic anemia.  Plan:  - Continue current care  - [place] [facility]ology consulted -> EGD shows LA Grade D reflux esophagitis with no bleeding.  -IV PPI BID -> PO today  -Slowly advanced diet and monitor symptoms  -As needed antiemetics.  Available Zofran, Compazine.  Also available lorazepam if no resolution of symptoms.  - consulted for return to facility     #3 history of cerebral palsy.  Partial quadriplegia  -Mostly wheelchair-bound state  -Also has a intellectual disability, nonverbal.  Currently not following simple verbal  instructions  -Challenging situation as patient keeps pulling out his IV access.  -Formal social service evaluation for discharge planning disposition    #4 prior diagnosis of malnutrition secondary to chronic illness  -Nutrition/dietitian evaluation    #5 reportedly fully vaccinated for COVID-19       Diet: Pureed Diet (level 4) Extremely Thick (level 4)    DVT Prophylaxis: Pneumatic Compression Devices  Luke Catheter: Not present  Central Lines: None  Cardiac Monitoring: None  Code Status: Full Code      Disposition Plan     Expected Discharge Date: 07/16/2022                The patient's care was discussed with the Patient, attempting to contact patient's guardian    I called the a work number for patient supposed to be listed guardian namely a Guardian [facility].  I was informed by the person who answered the phone that business never took off.  I left a voicemail on the mobile phone listed on patient's chart    Ghanshyam Brown MD  Hospitalist Service  [facility]  Securely message with the Vocera Web Console (learn more here)  Text page via [employer] Paging/Directory         Clinically Significant Risk Factors Present on Admission                      ______________________________________________________________________    Interval History      No acute events overnight  Hypotensive overnight and improved with IVF bolus  Agitated at times, needing mitts today  He remained nonverbal.    Afebrile.  Stable hemodynamics    Data reviewed today: I reviewed all medications, new labs and imaging results over the last 24 hours. I personally reviewed .    Physical Exam   Vital Signs: Temp: 99.4  F (37.4  C) Temp src: Axillary BP: 111/75 Pulse: 70   Resp: 18 SpO2: 92 % O2 Device: None (Room air)    Weight: 103 lbs 3.2 oz  HEENT; Atraumatic, normocephalic, pinkish conjuctiva, pupils bilateral reactive   Skin: warm and moist, no rashes  Lungs: equal chest expansion, clear to auscultation, no  wheezes, no stridor, no crackles,   Heart: normal rate, normal rhythm, no rubs or gallops.   Abdomen: normal bowel sounds, no tenderness, no peritoneal signs, no guarding  Extremities: no deformities, no edema   Neuro; moves all extremities, nonverbal, contracted    Data   Recent Labs   Lab 07/14/22  0103 07/12/22  0726 07/12/22  0024 07/11/22  0648 07/10/22  1549   WBC 5.3  --   --  4.4 4.7   HGB 8.9*  8.9*  --  9.1* 10.4* 10.2*   MCV 88  --   --  88 87   *  --   --  145* 162     --  139 142 144   POTASSIUM 3.8 4.3 4.0 4.2 3.9   CHLORIDE 112*  --  110* 110* 110*   CO2 26  --  26 29 30   BUN 15  --  14 14 13   CR 0.64*  --  0.70 0.71 0.64*   ANIONGAP 3  --  3 3 4   ASTRID 8.0*  --  7.8* 8.2* 9.1   GLC 96  --  118* 85 96   ALBUMIN  --   --   --   --  3.4   PROTTOTAL  --   --   --   --  6.7*   BILITOTAL  --   --   --   --  0.2   ALKPHOS  --   --   --   --  95   ALT  --   --   --   --  28   AST  --   --   --   --  13   LIPASE  --   --   --   --  127     No results found for this or any previous visit (from the past 24 hour(s)).  Medications       albuterol  2.5 mg Nebulization BID     cetirizine  10 mg Oral Daily     diclofenac  1 g Topical BID     fluticasone  2 spray Both Nostrils Daily     magnesium hydroxide  30 mL Oral Daily     montelukast  10 mg Oral At Bedtime     OLANZapine  2.5 mg Oral At Bedtime     olopatadine  1 drop Left Eye BID     pantoprazole  40 mg Oral BID AC     polyethylene glycol  17 g Oral BID     sennosides  2 tablet Oral BID     Sod Fluoride-Potassium Nitrate   Dental Daily     sodium chloride (PF)  3 mL Intracatheter Q8H     tiZANidine  4 mg Oral TID     Vitamin D3  1,000 Units Oral Daily

## 2022-07-14 NOTE — PLAN OF CARE
Goal Outcome Evaluation:    To Do:  End of Shift Summary  For vital signs and complete assessments, please see documentation flowsheets.     Pertinent assessments: Non-verbal. BS active, no BM. Low grade temps today- given rectal Tylenol. Tolerating PO intake. Some coughing after swallowing- MD aware. External cath in place, voiding adequately. Mitts on.     Major Shift Events: Restless and agitated today. Given IV zofran x2.    Treatment Plan: IV protonix, nausea management, and GI consult.    Bedside Nurse: Jazmyn Gilbert RN

## 2022-07-14 NOTE — PROGRESS NOTES
Cross cover notified of patient with hypotension blood pressure 88/43 with repeat 78/51.  Reviewed chart.  Patient has cerebral palsy and dysphagia.  Here with nausea and vomiting with limited p.o. intake so suspect this is hypovolemic.  His hemoglobin was 10.4 with repeat 9.1 after IV fluids for similar hypotension 2 nights ago.  He underwent EGD that showed esophagitis, but no active bleeding.  -1 L NS bolus now  -Will check hemoglobin and lactic acid stat

## 2022-07-15 NOTE — PROGRESS NOTES
[facility]    Medicine Progress Note - Hospitalist Service    Date of Admission:  7/10/2022  Date of Service: 07/15/2022    Assessment & Plan          eBnito Marx is a 56 year old male with known history of cerebral palsy, partial quadriplegia, at baseline cannot stand to transfer but typically wheelchair-bound, dysphagia, on aspiration precautions with prior history of aspiration pneumonia, prior episodic bouts of nausea and vomiting, recent hospitalization for significant constipation that was reportedly improved with several reported BMs on last hospitalization, intellectual disability essentially nonverbal, malnutrition who presented back in the emergency room coming from his group home setting due to reported intolerance to oral diet with recurrent abdominal symptomatology of nausea and vomiting.    Problem list:    #1 recurrent symptomatology of nausea, vomiting with intolerance to oral intake  #2 recent hospitalization for above symptoms and constipation.  Assessment: on that stay reportedly his constipation has significantly improved. Most recent abdominal x-ray showing minimal stools. Most recent CT scan of the abdomen pelvis was pursued on last hospitalization last week that showed large amount of stooling that might be contributing to his overall symptoms. He is a very poor historian.  No reported bleeding tendencies.  Stable chronic anemia.  Plan:  - Continue current care  - [place] gastroenterology consulted -> EGD shows LA Grade D reflux esophagitis with no bleeding.  -IV PPI BID -> PO BID now  -Slowly advanced diet and monitor symptoms  -As needed antiemetics.  Available Zofran, Compazine.  Also available lorazepam if no resolution of symptoms.  - consulted for return to facility once agitation improves    #3 history of cerebral palsy.  Partial quadriplegia  -Mostly wheelchair-bound state  -Also has a intellectual disability, nonverbal.  Currently not following simple  verbal instructions  -Challenging situation as patient keeps pulling out his IV access.  -Formal social service evaluation for discharge planning disposition    #4 prior diagnosis of malnutrition secondary to chronic illness  -Nutrition/dietitian evaluation    #5 reportedly fully vaccinated for COVID-19       Diet: Pureed Diet (level 4) Extremely Thick (level 4)    DVT Prophylaxis: Pneumatic Compression Devices  Luke Catheter: Not present  Central Lines: None  Cardiac Monitoring: None  Code Status: Full Code      Disposition Plan      Expected Discharge Date: 07/16/2022,  9:00 AM  Discharge Delays: *Early Discharge Anticipated            The patient's care was discussed with the Patient, attempting to contact patient's guardian    I called the a work number for patient supposed to be listed guardian namely a Guardian [employer] [facility].  I was informed by the person who answered the phone that business never took off.  I left a voicemail on the mobile phone listed on patient's chart    Ghanshyam Brown MD  Hospitalist Service  [facility]  Securely message with the Vocera Web Console (learn more here)  Text page via [employer] Paging/Directory         Clinically Significant Risk Factors Present on Admission                       ______________________________________________________________________    Interval History      No acute events overnight  He remained nonverbal.    Afebrile.  Stable hemodynamics    Data reviewed today: I reviewed all medications, new labs and imaging results over the last 24 hours. I personally reviewed .    Physical Exam   Vital Signs: Temp: 98.5  F (36.9  C) Temp src: Axillary BP: 98/57 Pulse: 75   Resp: 16 SpO2: 93 % O2 Device: None (Room air)    Weight: 104 lbs 3.2 oz     GEN: no apparent distress  HEENT; Atraumatic, normocephalic   Skin: warm and moist, no rashes  Lungs: equal chest expansion, clear to auscultation, no wheezes, no stridor, no crackles,   Heart: normal  rate, normal rhythm, no rubs or gallops.   Abdomen: normal bowel sounds, no tenderness, no peritoneal signs, no guarding  Extremities: no deformities, no edema   Neuro; moves all extremities, nonverbal, contracted    Data   Recent Labs   Lab 07/15/22  0639 07/14/22  0103 07/12/22  0726 07/12/22  0024 07/11/22  0648 07/10/22  1549   WBC 3.7* 5.3  --   --  4.4 4.7   HGB 10.3* 8.9*  8.9*  --  9.1* 10.4* 10.2*   MCV 86 88  --   --  88 87    128*  --   --  145* 162    141  --  139 142 144   POTASSIUM 3.7 3.8 4.3 4.0 4.2 3.9   CHLORIDE 108 112*  --  110* 110* 110*   CO2 27 26  --  26 29 30   BUN 15 15  --  14 14 13   CR 0.62* 0.64*  --  0.70 0.71 0.64*   ANIONGAP 4 3  --  3 3 4   ASTRID 8.7 8.0*  --  7.8* 8.2* 9.1   GLC 93 96  --  118* 85 96   ALBUMIN  --   --   --   --   --  3.4   PROTTOTAL  --   --   --   --   --  6.7*   BILITOTAL  --   --   --   --   --  0.2   ALKPHOS  --   --   --   --   --  95   ALT  --   --   --   --   --  28   AST  --   --   --   --   --  13   LIPASE  --   --   --   --   --  127     No results found for this or any previous visit (from the past 24 hour(s)).  Medications       albuterol  2.5 mg Nebulization BID     cetirizine  10 mg Oral Daily     diclofenac  1 g Topical BID     fluticasone  2 spray Both Nostrils Daily     magnesium hydroxide  30 mL Oral Daily     montelukast  10 mg Oral At Bedtime     OLANZapine  2.5 mg Oral At Bedtime     olopatadine  1 drop Left Eye BID     pantoprazole  40 mg Oral BID AC     polyethylene glycol  17 g Oral BID     sennosides  2 tablet Oral BID     Sod Fluoride-Potassium Nitrate   Dental Daily     sodium chloride (PF)  3 mL Intracatheter Q8H     tiZANidine  4 mg Oral TID     Vitamin D3  1,000 Units Oral Daily

## 2022-07-15 NOTE — PLAN OF CARE
Assessments: [place] orientation. Alert, restless at times. PRN Zofran x1. Emesis x1. Ax2 w lift. Repositioned as needed. External cath.    Treatment Plan: supportive cares    Bedside Nurse: Zac Melchor RN    /75 (BP Location: Left arm)   Pulse 84   Temp 98  F (36.7  C) (Axillary)   Resp 16   Wt 47.3 kg (104 lb 3.2 oz)   SpO2 91%   BMI 20.35 kg/m

## 2022-07-15 NOTE — PLAN OF CARE
Assumed cares at 1900H    Assessments: Non-verbal, Slightly restless. Scheduled bedtime meds given crush with pudding. Infrequent congest cough. External catheter in place. Ax2 with lift. Repositioned comfortably in bed.     Treatment Plan: Protonix, PRN antiemetics, supportive cares    Bedside Nurse: Jackson De La O RN

## 2022-07-15 NOTE — PLAN OF CARE
Pertinent assessments: Calm, nonverbal. Slightly restless. Compazine given x1. Infrequent congested cough. External catheter in place. Soft mitts on. Ax2 with lift.     Major Shift Events: uneventful    Treatment Plan: PRN antiemetics, supportive cares    Bedside Nurse: Chen Khan RN

## 2022-07-16 NOTE — PROGRESS NOTES
Care Management Discharge Note    Discharge Date: 07/16/2022       Discharge Disposition: Group Home    Discharge Services: None    Discharge DME: None    Discharge Transportation: other (see comments) (Group Home staff)    Private pay costs discussed: Not applicable    PAS Confirmation Code:  (N/A)  Patient/family educated on Medicare website which has current facility and service quality ratings: (N/A)    Education Provided on the Discharge Plan:  yes  Persons Notified of Discharge Plans: Pt's GH and Pt's guardian, pt's bedside nurse  Patient/Family in Agreement with the Plan: yes    Handoff Referral Completed: Yes    Additional Information:  The pt will return to his  today, [facility], at 1600.  The pt's  staff will provide transportation.  Carmen confirmed with the 's , Sonia P: [phone].  Sonia said that orders do not need to be faxed, just sent with the pt.  Any new meds should be filled at [facility] in [place].    Carmen left a vm for the  RN, Rebecca [phone], updating her on the pt's return.  Carmen left a vm with the pt's guardians, Ben P: [phone], updating them on the pt's discharge today.    Sw will continue to be available as needed until discharge.      NILAM Cheema, [facility]  Inpatient Care Coordination  [facility]  [phone]

## 2022-07-16 NOTE — PLAN OF CARE
Assessments: Restless, nonverbal. Infrequent congested cough. External catheter in place. Soft mitts on. Ax2 with lift. Shifts weight, repositioned as patient tolerates.     Major Shift Events: emesis x1 - compazine effective    Treatment Plan: aspiration precautions, PRN antiemetics, supportive cares    Bedside Nurse: Jackson De La O RN

## 2022-07-16 NOTE — PROGRESS NOTES
Assessments: Lethargic, nonverbal. Ax2 with lift. Does not appear to be in pain; rFLACC scale utilized. VSS on RA. Lung sounds diminished. Incontinent of B&B; external cath in place. Repo q2hrs.    Major Shift Events: none.    Treatment Plan: aspiration precautions, PRN antiemetics, pureed + extremely thick diet, supportive cares.    Bedside Nurse: Eliezer Mtason RN

## 2022-07-16 NOTE — PLAN OF CARE
Goal Outcome Evaluation:                To Do:  End of Shift Summary  For vital signs and complete assessments, please see documentation flowsheets.     Pertinent assessments: nonverbal -- restless -- breathing labored - temp--- tachy -- O2 PER OXIMASK AT 5L----  Major Shift Events  discharge cancelled   Treatment Plan:  monitor-- labs and antibiotics  Bedside Nurse: Kristi Gambino RN

## 2022-07-16 NOTE — PROGRESS NOTES
Hospitalist Medicine Progress Note   [facility]       Benito Marx is a 57 year old male with known history of cerebral palsy, partial quadriplegia, at baseline cannot stand to transfer but typically wheelchair-bound, dysphagia, on aspiration precautions with prior history of aspiration pneumonia, prior episodic bouts of nausea and vomiting, recent hospitalization for significant constipation that was reportedly improved with several reported BMs on last hospitalization, intellectual disability essentially nonverbal, malnutrition who presented back in the emergency room coming from his group home setting due to reported intolerance to oral diet with recurrent abdominal symptomatology of nausea and vomiting.       Date of Admission:  7/10/2022  Assessment & Plan      Fever  Hypoxia  Nausea vomiting with intolerance to oral food  Cerebral palsy malnutrition  Constipation        Plan:   Check UA,  Check chest x-ray  Start Unasyn for possible aspiration pneumonia  Postpone discharge    Diet: Pureed Diet (level 4) Extremely Thick (level 4)  Diet    DVT Prophylaxis: Enoxaparin (Lovenox) SQ  Luke Catheter: Not present  Code Status: Full Code             .    Boris Brooks MD  Hospitalist Service  [facility]    ______________________________________________________________________    Interval History     Symptoms   Unable to obtain any history    Review of Systems:   As above    Data reviewed today: I reviewed all medications, new labs and imaging results over the last 24 hours.     Physical Exam   Vital Signs: Temp: (!) 101.8  F (38.8  C) Temp src: Axillary BP: 132/77 Pulse: (!) 142   Resp: 28 SpO2: 90 % O2 Device: Nasal cannula Oxygen Delivery: 5 LPM  Weight: 103 lbs 3.2 oz      GENERAL: Patient is not in acute distress  HEENT: EOM+,Conjunctiva is clear   NECK:  no Jugular Venous distention  HEART: S1 S2 tachycardia is present,   LUNGS: Respirations are not laboured, Lungs  have decreased breath sounds in the lung bases to auscultation without Crepitations or Wheezing   ABDOMEN: Soft, there is no tenderness ,Bowel Sounds are Positive   LOWER LIMBS: no Pedal Edema  Bilaterally   CNS:  Alert, unable to communicate    Data   Recent Labs   Lab 07/16/22  0743 07/15/22  0639 07/14/22  0103 07/12/22  0726 07/12/22  0024 07/11/22  0648 07/10/22  1549   WBC  --  3.7* 5.3  --   --  4.4 4.7   HGB  --  10.3* 8.9*  8.9*  --  9.1* 10.4* 10.2*   MCV  --  86 88  --   --  88 87   PLT  --  166 128*  --   --  145* 162   NA  --  139 141  --  139 142 144   POTASSIUM 4.0 3.7 3.8   < > 4.0 4.2 3.9   CHLORIDE  --  108 112*  --  110* 110* 110*   CO2  --  27 26  --  26 29 30   BUN  --  15 15  --  14 14 13   CR  --  0.62* 0.64*  --  0.70 0.71 0.64*   ANIONGAP  --  4 3  --  3 3 4   ASTRID  --  8.7 8.0*  --  7.8* 8.2* 9.1   GLC  --  93 96  --  118* 85 96   ALBUMIN  --   --   --   --   --   --  3.4   PROTTOTAL  --   --   --   --   --   --  6.7*   BILITOTAL  --   --   --   --   --   --  0.2   ALKPHOS  --   --   --   --   --   --  95   ALT  --   --   --   --   --   --  28   AST  --   --   --   --   --   --  13   LIPASE  --   --   --   --   --   --  127    < > = values in this interval not displayed.         No results found for this or any previous visit (from the past 24 hour(s)).

## 2022-07-16 NOTE — PLAN OF CARE
Pertinent assessments: Calm, nonverbal. No nonverbal indications of pain. Infrequent congested cough, LS dim. Soft mitts on. External catheter in place. Reposition as tolerated, Ax2 with lift.    Major Shift Events: uneventful    Treatment Plan: aspiration precautions, supportive cares    Bedside Nurse: Chen Khan RN

## 2022-07-17 NOTE — PROGRESS NOTES
Respiratory Therapy Note    An ABG was completed on the Pt's right radial artery @ 09:03 on an Oxymask at 2 Lpm.  Pressure was held until bleeding stopped.  No complications noted during the procedure.        July 17, 2022 9:08 AM  Adrien Trujillo, RT

## 2022-07-17 NOTE — PLAN OF CARE
SLP: Orders received, chart reviewed and attempted to complete evaluation, however, patient called down to CT. Patient has a longstanding history of dysphagia and aspiration pneumonia and is familiar to speech department from previous admission and swallow assessment in January of 2022 where patient was discharged on his baseline diet of pureed with extremely thick liquids.

## 2022-07-17 NOTE — PROGRESS NOTES
To Do:  End of Shift Summary  For vital signs and complete assessments, please see documentation flowsheets.     Pertinent assessments: Non verbal. O2 5L Oxymask, sats 93%. Afebrile.tachycardic. Repositioned. External cath.  Major Shift Events CXR completed, MD notified. UA sent  Treatment Plan: Unasyn, supplemental O2 and symptom management  Bedside Nurse: eDb Dunbar RN

## 2022-07-17 NOTE — PLAN OF CARE
Goal Outcome Evaluation:  Pertinent assessments: pt is nonverbal and lethargic. On 5L O2- desaturated to 84% titrated to 10L maintained to 92%. ,   Major Shift Events : Temp 101- tylenol supp. 7AM weaned to 4L O2 oxymask.  Treatment Plan: Ampicillin, symptom management, pending discharge  Bedside Nurse: Hubert Sylvester RN

## 2022-07-17 NOTE — PLAN OF CARE
Major Shift Events: difficulty swallowing and lethargic after taking morning meds. MD notified, ABGs and labs, CT, speech and fluids ordered    Treatment Plan: Ampicillin, symptom management, wean O2, pending discharge    Bedside Nurse: Chen Khan RN

## 2022-07-17 NOTE — PROGRESS NOTES
Hospitalist Medicine Progress Note   [facility]       Benito Marx is a 57 year old male with known history of cerebral palsy, partial quadriplegia, at baseline cannot stand to transfer but typically wheelchair-bound, dysphagia, on aspiration precautions with prior history of aspiration pneumonia, prior episodic bouts of nausea and vomiting, recent hospitalization for significant constipation that was reportedly improved with several reported BMs on last hospitalization, intellectual disability essentially nonverbal, malnutrition who presented back in the emergency room coming from his group home setting due to reported intolerance to oral diet with recurrent abdominal symptomatology of nausea and vomiting.  Patient did have fever and hypoxia chest x-ray was normal without showing any signs of pneumonia but a CT scan of the chest without contrast done showed left upper and lower lobe pneumonia possibly secondary to aspiration with hiatus hernia and large volume material in the left mainstem bronchus.  Unfortunately there were also incidental small pulmonary nodules seen with the largest measuring 4 mm in the right middle lobe which will need follow-up.  Patient was started on Unasyn 7/16/2022       Date of Admission:  7/10/2022  Assessment & Plan      Aspiration pneumonia  Not seen on chest x-ray but diagnosed on CT scan associated with Fever and Hypoxia  Patient's oxygen requirements have decreased from a peak of 10 L to 2 L now    Nausea vomiting with intolerance to oral food  With hiatus hernia  Patient is having aspiration  Appreciate speech pathology swallowing evaluation and recommendations    Cerebral palsy malnutrition  Constipation        Plan:   Monitor patient for oxygenation hopefully this will improve  Postpone discharge for another 1 to 2 days  CBC in a.m. along with BMP    Diet: Diet  NPO for Medical/Clinical Reasons Except for: No Exceptions    DVT Prophylaxis: Enoxaparin  (Lovenox) SQ  Luke Catheter: Not present  Code Status: Full Code             .    Boris Brooks MD  Hospitalist Service  [facility]    ______________________________________________________________________    Interval History     Symptoms   Unable to obtain any history -but patient looks more alert today as compared to yesterday    Review of Systems:   As above    Data reviewed today: I reviewed all medications, new labs and imaging results over the last 24 hours.     Physical Exam   Vital Signs: Temp: 97.4  F (36.3  C) Temp src: Oral BP: 96/60 Pulse: 71   Resp: 18 SpO2: 97 % O2 Device: Oxymask Oxygen Delivery: 2 LPM  Weight: 90 lbs 12.8 oz      GENERAL: Patient is not in acute distress  HEENT: EOM+,Conjunctiva is clear   NECK:  no Jugular Venous distention  HEART: S1 S2 tachycardia is present,   LUNGS: Respirations are not laboured, Lungs have decreased breath sounds in the lung bases to auscultation without Crepitations or Wheezing   ABDOMEN: Soft, there is no tenderness ,Bowel Sounds are Positive   LOWER LIMBS: no Pedal Edema  Bilaterally   CNS:  Alert, unable to communicate    Data   Recent Labs   Lab 07/17/22  0907 07/16/22  1726 07/16/22  0743 07/15/22  0639 07/14/22  0103   WBC 6.2 6.9  --  3.7* 5.3   HGB 10.9* 11.6*  --  10.3* 8.9*  8.9*   MCV 87 86  --  86 88    208  --  166 128*     --   --  139 141   POTASSIUM 3.8  --  4.0 3.7 3.8   CHLORIDE 113*  --   --  108 112*   CO2 24  --   --  27 26   BUN 36*  --   --  15 15   CR 0.54*  --   --  0.62* 0.64*   ANIONGAP 7  --   --  4 3   ASTRID 8.5  --   --  8.7 8.0*   *  --   --  93 96         Recent Results (from the past 24 hour(s))   XR Chest 1 View    Narrative    EXAM: XR CHEST 1 VIEW  LOCATION: [facility]  DATE/TIME: 7/16/2022 7:40 PM    INDICATION: fever hypoxia  COMPARISON: 07/05/2022 and CT abdomen and pelvis 07/05/2022      Impression    IMPRESSION: Heart size prominent on this AP view.  Pulmonary vascularity normal. Esophageal hiatal hernia, better appreciated on CT. Lungs otherwise clear. No new infiltrates or effusions. Scoliosis.   CT Chest w/o Contrast    Narrative    EXAM: CT CHEST W/O CONTRAST  LOCATION: [facility]  DATE/TIME: 7/17/2022 2:17 PM    INDICATION: hypoxia and fever   CXR does not show any Pneumonia  COMPARISON: Chest radiograph 07/16/2022  TECHNIQUE: CT chest without IV contrast. Multiplanar reformats were obtained. Dose reduction techniques were used.  CONTRAST: None.    FINDINGS:   LUNGS AND PLEURA: Patchy airspace disease in the left upper lobe with more confluent consolidation in the left lower lobe. The left mainstem bronchus is completely impacted with internal frothy material with resultant left sided volume loss. No pleural   effusion or pneumothorax. Incidental small right pulmonary nodules, largest measuring 4 mm in the right middle lobe (series 5 image 145).    MEDIASTINUM/AXILLAE: No lymphadenopathy. No thoracic aortic aneurysm. Moderate hiatal hernia with internal fluid and debris in the proximal esophagus.    CORONARY ARTERY CALCIFICATION: None.    UPPER ABDOMEN: No significant finding. Prior cholecystectomy.    MUSCULOSKELETAL: No acute bony abnormality.      Impression    IMPRESSION:   1.  Left upper and lower lobe pneumonia, possibly secondary to aspiration given hiatal hernia and large volume of material in the left mainstem bronchus.  2.  Incidental small pulmonary nodules, largest measuring 4 mm in the right middle lobe. Consider follow-up described below.    REFERENCE:  Guidelines for Management of Incidental Pulmonary Nodules Detected on CT Images: From the Fleischner Society 2017.   Guidelines apply to incidental nodules in patients who are 35 years or older.  Guidelines do not apply to lung cancer screening, patients with immunosuppression, or patients with known primary cancer.    MULTIPLE NODULES  Nodule size <6 mm  Low-risk  patients: No follow-up needed.  High-risk patients: Optional follow-up at 12 months.

## 2022-07-17 NOTE — PROGRESS NOTES
"Swallow evaluation completed. Patient position upright in bed as best as possible. Unable to complete oral mech exam. Both pureed and extremely thick liquids resulted in poor A/P movement and suspected premature pharyngeal entry, poor hyolaryngeal elevation, multiple swallows (+10) and cough with wet sounding vocal quality. Explained to patient who kept signing \"eat.\" Call made to Sonia at group home ([phone]) who reports that this is not his baseline level of functioning. He has not have a video swallow study in some time and she reports having trouble getting a prescription for Thick-It due to not having an updated swallow study. SLP to follow for po readiness and a video swallow study would be beneficial prior to resuming a diet.       IP Clinical Bedside Swallow Evaluation  [facility]  07/17/22 2127   General Information   Onset of Illness/Injury or Date of Surgery 07/10/22   Referring Physician Dr. Brooks   Patient/Family Therapy Goal Statement (SLP) Unable to state/nonverbal   Pertinent History of Current Problem Per provider \"Benito Marx is a 56 year old male with a history of  Cerebral palsy with partial quadriplegia (can stand to transfer but typically in a wheelchair), dysphagia who eats with aspiration precautions an hx of aspiration pna,  episodic n/v admitted on 7/5/2022 with intractable n/v\"   General Observations Patient alert and making sign for \"eat.\"   Past History of Dysphagia Baseline dysphagia and on a pureed with extremely thick liquids.   Type of Evaluation   Type of Evaluation Swallow Evaluation   Oral Motor   Oral Musculature unable to assess due to poor participation/comprehension   General Swallowing Observations   Respiratory Support (General Swallowing Observations) oxygen mask  (2 LPM)   Current Diet/Method of Nutritional Intake (General Swallowing Observations, NIS) pureed (level 4);extremely thick liquids (level 4)   Swallowing Evaluation Clinical " swallow evaluation   Clinical Swallow Evaluation   Feeding Assistance dependent   Clinical Swallow Evaluation Textures Trialed extremely thick liquids;pureed   Clinical Swallow Eval: Extremely Thick Liquids   Mode of Presentation spoon;fed by clinician   Volume Presented x2 teaspoons   Oral Phase delayed AP movement;premature pharyngeal entry   Pharyngeal Phase impaired;coughing/choking;reduction in laryngeal movement;repeated swallows;wet vocal quality after swallow   Diagnostic Statement Overt s/sx of aspiration   Clinical Swallow Evaluation: Puree Solid Texture Trial   Mode of Presentation, Puree spoon;fed by clinician   Volume of Puree Presented x2 teaspoons   Oral Phase, Puree delayed AP movement;premature pharyngeal entry   Pharyngeal Phase, Puree impaired;coughing/choking;reduction in laryngeal movement;repeated swallows;wet vocal quality after swallow   Diagnostic Statement Overt s/sx of aspiration   Swallowing Recommendations   Diet Consistency Recommendations NPO   Instrumental Assessment Recommendations VFSS (videofluoroscopic swallowing study)   Comment, Swallowing Recommendations Would benefit from VFSS prior to initiating a diet   General Therapy Interventions   Planned Therapy Interventions Dysphagia Treatment   Dysphagia treatment   (assess for po readiness)   Intervention Comments assess for po readiness   Clinical Impression   Criteria for Skilled Therapeutic Interventions Met (SLP Eval) Yes, treatment indicated   SLP Diagnosis severe oropharyngeal dysphagia   Problem List (SLP) dysphagia   Functional Limitations Related to Problem List (SLP) NPO and high aspiration risk   Risks & Benefits of therapy have been explained evaluation/treatment results reviewed;care plan/treatment goals reviewed;participants included;patient;guardian   Clinical Impression Comments Swallow evaluation completed. Patient position upright in bed as best as possible. Unable to complete oral mech exam. Both pureed and  "extremely thick liquids resulted in poor A/P movement and suspected premature pharyngeal entry, poor hyolaryngeal elevation, multiple swallows (+10) and cough with wet sounding vocal quality. Explained to patient who kept signing \"eat.\" Call made to Sonia at group home ([phone]) who reports that this is not his baseline level of functioning. He has not have a video swallow study in some time and she reports having trouble getting a prescription for Thick-It due to not having an updated swallow study. SLP to follow for po readiness and a video swallow study would be beneficial prior to resuming a diet.   SLP Discharge Planning   SLP Discharge Recommendation home with home care speech therapy   SLP Rationale for DC Rec Patient is not at his baseline with swallowing and may need SLP services at group home if goals not met during IP stay.   SLP Brief overview of current status  Swallow eval comlpleted. Overt s/sx of aspiration with pureed and extremely thick liquids and drop in O2 sats. Call made to  and this is not his baseline level for swallowing. Recommend NPO with SLP to continue to see to assess for po readiness. MD paged with results and spoke with RN.    Total Evaluation Time   Total Evaluation Time (Minutes) 12   Diego Nuno MS CCC-SLP  "

## 2022-07-18 NOTE — PLAN OF CARE
Goal Outcome Evaluation:    Problem: Oral Intake Inadequate  Goal: Improved Oral Intake  Outcome: Ongoing, Not Progressing     Need for nutrition-related goals of care discussions based on direction from SLP (NPO w/ VFSS planned), see other note.

## 2022-07-18 NOTE — PROVIDER NOTIFICATION
MD paged- Patient strict NPO. Can you please switch as many PO meds as you can to IV? Also- would you like nutrition consult? Thanks.    Jazmyn Gilbert RN

## 2022-07-18 NOTE — PROVIDER NOTIFICATION
Dr. Brooks paged- Also- no BM documented since 7/12. Would you like to order suppository or abdominal imaging? Thanks.    Jazmyn Gilbert RN

## 2022-07-18 NOTE — PROGRESS NOTES
Hospitalist Medicine Progress Note          Benito Marx is a 57 year old male with known history of cerebral palsy, partial quadriplegia, at baseline cannot stand to transfer but typically wheelchair-bound, dysphagia, on aspiration precautions with prior history of aspiration pneumonia, prior episodic bouts of nausea and vomiting, recent hospitalization for significant constipation that was reportedly improved with several reported BMs on last hospitalization, intellectual disability essentially nonverbal, malnutrition who presented back in the emergency room coming from his group home setting due to reported intolerance to oral diet with recurrent abdominal symptomatology of nausea and vomiting.  Patient did have fever and hypoxia chest x-ray was normal without showing any signs of pneumonia but a CT scan of the chest without contrast done showed left upper and lower lobe pneumonia possibly secondary to aspiration with hiatus hernia and large volume material in the left mainstem bronchus.  Unfortunately there were also incidental small pulmonary nodules seen with the largest measuring 4 mm in the right middle lobe which will need follow-up.  Patient was started on Unasyn 7/16/2022       Date of Admission:  7/10/2022  Assessment & Plan      Aspiration pneumonia  Not seen on chest x-ray but diagnosed on CT scan associated with Fever and Hypoxia  Patient's oxygen requirements have decreased from a peak of 10 L to 2 L now    Nausea vomiting with intolerance to oral food  Aspiration  With hiatus hernia  Patient is having aspiration  Appreciate speech pathology swallowing evaluation and recommendations    Constipation  Patient has not had bowel movements for the past 7 days  Will provide Dulcolax suppository as well as discussed with the family/caretaker regarding feeding tube    Cerebral palsy malnutrition    Discussed in detail with Suzanne who is her guardian regarding what the  patient would want or if there is any living will regarding feeding.  Suzanne states that she is out of state and would return on Thursday and look up if there is any advance directives in the file.  She wanted me also to talk to the group home to know if there is any advance directive.  Will await  Further recommendations from speech pathology regarding swallowing      Plan:   Dulcolax suppository ordered regarding constipation for now. -Discussed this with the patient's nurse during rounding.   Patient's oral medications have been changed to IV which could be changed to IV.   Evaluate the patient again after swallowing evaluation and discussed with group home if there is any advance directive otherwise we will have to wait until his guardian comes back Thursday to work  CBC and BMP in am     Plan of care discussed with the patient's nurse and all the questions raised were answered      Diet: Diet  NPO for Medical/Clinical Reasons Except for: No Exceptions    DVT Prophylaxis: Enoxaparin (Lovenox) SQ  Luke Catheter: Not present  Code Status: Full Code             .    Boris Brooks MD  Hospitalist Service  [facility]    ______________________________________________________________________    Interval History     Symptoms   Unable to obtain any history -but patient looks confused though alert today as compared to yesterday    Review of Systems:   As above    Data reviewed today: I reviewed all medications, new labs and imaging results over the last 24 hours.     Physical Exam   Vital Signs: Temp: 97.7  F (36.5  C) Temp src: Axillary BP: 118/69 Pulse: 63   Resp: 18 SpO2: 94 % O2 Device: None (Room air) Oxygen Delivery: 3 LPM  Weight: 96 lbs 3.36 oz      GENERAL: Patient is not in acute distress  HEENT: EOM+,Conjunctiva is clear   NECK:  no Jugular Venous distention  HEART: S1 S2 tachycardia is present,   LUNGS: Respirations are not laboured, Lungs have decreased breath sounds in the lung bases  to auscultation without Crepitations or Wheezing   ABDOMEN: Soft, there is no tenderness ,Bowel Sounds are Positive   LOWER LIMBS: no Pedal Edema  Bilaterally   CNS:  Alert, unable to communicate, confused     Data   Recent Labs   Lab 07/18/22  0800 07/17/22  0907 07/16/22  1726 07/16/22  0743 07/15/22  0639 07/14/22  0103   WBC  --  6.2 6.9  --  3.7* 5.3   HGB  --  10.9* 11.6*  --  10.3* 8.9*  8.9*   MCV  --  87 86  --  86 88   PLT  --  195 208  --  166 128*   NA  --  144  --   --  139 141   POTASSIUM 3.4 3.8  --  4.0 3.7 3.8   CHLORIDE  --  113*  --   --  108 112*   CO2  --  24  --   --  27 26   BUN  --  36*  --   --  15 15   CR  --  0.54*  --   --  0.62* 0.64*   ANIONGAP  --  7  --   --  4 3   ASTRID  --  8.5  --   --  8.7 8.0*   GLC  --  131*  --   --  93 96         Recent Results (from the past 24 hour(s))   CT Chest w/o Contrast    Narrative    EXAM: CT CHEST W/O CONTRAST  LOCATION: [facility]  DATE/TIME: 7/17/2022 2:17 PM    INDICATION: hypoxia and fever   CXR does not show any Pneumonia  COMPARISON: Chest radiograph 07/16/2022  TECHNIQUE: CT chest without IV contrast. Multiplanar reformats were obtained. Dose reduction techniques were used.  CONTRAST: None.    FINDINGS:   LUNGS AND PLEURA: Patchy airspace disease in the left upper lobe with more confluent consolidation in the left lower lobe. The left mainstem bronchus is completely impacted with internal frothy material with resultant left sided volume loss. No pleural   effusion or pneumothorax. Incidental small right pulmonary nodules, largest measuring 4 mm in the right middle lobe (series 5 image 145).    MEDIASTINUM/AXILLAE: No lymphadenopathy. No thoracic aortic aneurysm. Moderate hiatal hernia with internal fluid and debris in the proximal esophagus.    CORONARY ARTERY CALCIFICATION: None.    UPPER ABDOMEN: No significant finding. Prior cholecystectomy.    MUSCULOSKELETAL: No acute bony abnormality.      Impression    IMPRESSION:    1.  Left upper and lower lobe pneumonia, possibly secondary to aspiration given hiatal hernia and large volume of material in the left mainstem bronchus.  2.  Incidental small pulmonary nodules, largest measuring 4 mm in the right middle lobe. Consider follow-up described below.    REFERENCE:  Guidelines for Management of Incidental Pulmonary Nodules Detected on CT Images: From the Fleischner Society 2017.   Guidelines apply to incidental nodules in patients who are 35 years or older.  Guidelines do not apply to lung cancer screening, patients with immunosuppression, or patients with known primary cancer.    MULTIPLE NODULES  Nodule size <6 mm  Low-risk patients: No follow-up needed.  High-risk patients: Optional follow-up at 12 months.

## 2022-07-18 NOTE — PLAN OF CARE
Goal Outcome Evaluation:      To Do:  End of Shift Summary  For vital signs and complete assessments, please see documentation flowsheets.     Pertinent assessments: Non verbal. VSS. Desats to 85% when sleeping, bump O2 3L oxymask. Strict NPO. No signs of pain. External cath in place.   0700- Weaned O2.  Major Shift Events: uneventful  Treatment Plan: Unasyn, NPO and IVF.  Bedside Nurse: Deb Dunbar RN

## 2022-07-18 NOTE — PROGRESS NOTES
CLINICAL NUTRITION SERVICES - REASSESSMENT NOTE      Recommendations:   - Diet per MD/SLP based on goals of care.  - Meeting <50-75% needs orally over the past 3-5 days during admit (difficult to determine exactly, see below).  Nutrition team will need direction from MD moving forward regarding nutrition-related POC based on goals of care.  MD was paged by SLP team regarding consideration of goals of care discussions/possible need for enteral interventions (?short term bridge vs chronic, especially with concern for baseline ability to meet hydration needs).  Awaiting direction.     MALNUTRITION   % Weight Loss:  None noted PTA, difficulty determine if fluid shifts vs bed scale differences/inaccuracy during admit  % Intake: Unable to determine --> difficult to determine during admit but likely meeting <50-75% needs over the past 3-5 days during admit  Subcutaneous Fat Loss: None observed --> in face, wheelchair bound at baseline w/ CP hx  Muscle Loss: None observed --> in face, wheelchair bound at baseline w/ CP hx  Fluid Retention: None documented    Malnutrition Diagnosis: Patient does not meet two of the above criteria necessary for diagnosing malnutrition          EVALUATION OF PROGRESS TOWARD GOALS   Diet: NPO    Intake/Tolerance:  PMH of CP, has a legal guardian, and resides in group home setting per discussion with staff and review of chart.  Is wheelchair bound at baseline and dysphagia is chronic.  With previous N/V GI following and since signed off (EGD found esophagitis with hiatal hernia). No emesis noted/recorded since 7/15.  Since admit/last RD assessment began having increase in O2 needs and SLP consulted.  Noted baseline diet of pureed/extremely thick liquids.  Had been consuming % of meals per review of flowsheets and consistently receiving/ordering meals until ~7/14-7/15.  Confirmed with RN who had Blaze last week as well.  She feels his PO intakes slowly became impacted by dysphagia,  esophagitis, and partially O2 needs/overall not feeling well, even when had an oral diet.  SLP recommended NPO 7/17 and planning for VFSS.  Per review of chart patient has been asking for food via signing?  Suspect meeting <50-75% needs orally over the past 3-5 days d/t dysphagia.  MD was paged by SLP team regarding consideration of goals of care discussions/possible need for enteral interventions (?bridge vs chronic, especially with concern for baseline ability to meet hydration needs).  Also of note, currently requiring mitts and weaned to room air this AM.  Nutrition team will need direction from MD moving forward regarding nutrition-related POC based on goals of care.      ASSESSED NUTRITION NEEDS PER APPROVED PRACTICE GUIDELINES:    Dosing Weight 46 kg   Estimated Energy Needs: >/=1368 kcals ([address] Jeor w/ activity factor >/=1.2)  Justification: maintenance  Estimated Protein Needs: 46-55 grams protein (1-1.2 g pro/Kg)  Justification: preservation of lean body mass  Estimated Fluid Needs: per MD      NEW FINDINGS:   - Medications reviewed.  - Labs reviewed.  - Stooling patterns reviewed.  -  Weight trending reviewed and difficult to determine if true wt changes acutely vs bed scale differences/inaccuracy:  Vitals:    07/14/22 0630 07/15/22 0304 07/16/22 0617 07/17/22 0628   Weight: 46.8 kg (103 lb 3.2 oz) 47.3 kg (104 lb 3.2 oz) 46.8 kg (103 lb 3.2 oz) 41.2 kg (90 lb 12.8 oz)    07/18/22 0700   Weight: 43.6 kg (96 lb 3.4 oz)       Previous Goals:   Nutrition Goals: PO >75%  Evaluation: Not met consistently     Previous Nutrition Diagnosis:   Inadequate oral intake related to acute illness as evidenced by emesis episodes and restricted diet orders (NPO, now pureed with moderately thick liquids).   Evaluation: No change in problem      CURRENT NUTRITION DIAGNOSIS  Inadequate oral intake related to chronic dysphagia with aspiration concern/acute respiratory needs as evidenced by meeting <50-75% needs over  the past 3-5 days during admit and now NPO per SLP with recs for goals of care discussions.    INTERVENTIONS  Recommendations / Nutrition Prescription  See above.    Implementation  Collaboration and Referral of Nutrition care: Discussed POC RN, SLP, and LSW.    Goals  Goals of care discussions, including nutrition-related, w/in the next 24-48 hrs.       MONITORING AND EVALUATION:  Progress towards goals will be monitored and evaluated per protocol and Practice Guidelines      Vonda Kirk RDN, LD  Clinical Dietitian  3rd floor/ICU: [phone]  All other floors: [phone]  Weekend/holiday: [phone]  Office: [phone]

## 2022-07-18 NOTE — PLAN OF CARE
Goal Outcome Evaluation:    To Do:  End of Shift Summary  For vital signs and complete assessments, please see documentation flowsheets.     Pertinent assessments: Non verbal. VSS. Desats to 85% when sleeping, bump O2 3L oxymask. Strict NPO. No signs of pain. External cath in place. On RA when awake.    Major Shift Events: Given suppository- had small soft BM. Received full bed bath and linen change. Replaced K+, 3.4. Will order another replacement.    Treatment Plan: Unasyn, NPO, and IVF. Video swallow scheduled for tomorrow.     Bedside Nurse: Jazmyn Gilbert RN

## 2022-07-18 NOTE — PLAN OF CARE
For vital signs and complete assessments, please see documentation flowsheets.     Pertinent assessments: Nonverbal. Frustrated, agitated, angry, hungry, asking for food, Pt. Can not swallow, NPO. Afebrile. On 2L O2 via oxymask.  External catheter in place. Repositioned as tolerated. Ax2 with lift.    Major Shift Events: difficulty swallowing  ABGs and labs, CT, on  IV  fluids     Treatment Plan: Ampicillin, symptom management, wean O2, pending discharge  BP 96/60 (BP Location: Left arm)   Pulse 71   Temp 97.4  F (36.3  C) (Oral)   Resp 18   Wt 41.2 kg (90 lb 12.8 oz)   SpO2 97%   BMI 17.73 kg/m     Bedside Nurse: Rebekah Gomez RN

## 2022-07-19 NOTE — PLAN OF CARE
Goal Outcome Evaluation:      To Do:  End of Shift Summary  For vital signs and complete assessments, please see documentation flowsheets.     Pertinent assessments: Non verbal. VSS. HR low 50's when sleeping. Sats 95% on RA. No sings of pain noted. Stric NPO. External cath.  Major Shift Events: K+ replaced, rechecked 3.5  Treatment Plan: Video swallow today, strict NPO, Unasyn.  Bedside Nurse: Deb Dunbar RN

## 2022-07-19 NOTE — PROGRESS NOTES
"Video Fluoroscopic Swallow Study (VFSS)    Recommendations: puree solids with extremely thick liquids, liquids via 1/2 tsp bolus sizes, slow pacing. Pt must be fully upright, encourage into chair for PO if able.     07/19/22 1300   General Information   Onset of Illness/Injury or Date of Surgery 07/10/22   Referring Physician Dr. Boroks   Patient/Family Therapy Goal Statement (SLP) Unable to state/nonverbal   Pertinent History of Current Problem Per provider \"Benito Marx is a 56 year old male with a history of  Cerebral palsy with partial quadriplegia (can stand to transfer but typically in a wheelchair), dysphagia who eats with aspiration precautions an hx of aspiration pna,  episodic n/v admitted on 7/5/2022 with intractable n/v\" Notable signs/sx dysphagia/aspiration at bedside therefore NPO until SLP for VFSS.   Type of Evaluation   Type of Evaluation Swallow Evaluation   General Swallowing Observations   Swallowing Evaluation Videofluoroscopic swallow study (VFSS)   VFSS Evaluation   Radiologist Dr. Moreau   Views Taken left lateral   Physical Location of Procedure [facility], radiology dept, fluoroscopy suite using C-ARM   VFSS Textures Trialed mildly thick liquids;moderately thick liquids/liquidized;pureed   VFSS Eval: Mildly Thick Liquids   Mode of Presentation spoon;fed by clinician   Order of Presentation x2 trials   Preparatory Phase Poor bolus control   Oral Phase Delayed AP movement;Effortful AP movement;Premature pharyngeal entry  (spillage to the pyriform sinuses)   Pharyngeal Phase Delayed swallow reflex   Rosenbek's Penetration Aspiration Scale 7 - contrast passes glottis, visible subglottic residue remains despite patient's response (aspiration)   Response to Aspiration unproductive reflexive involuntary cough/throat clear   Diagnostic Statement Pt with moderate amounts of before/during aspiraiton + cough response 2/2 premature bolus spillage/delayed swallow   VFSS Eval: " Moderately Thick Liquids   Mode of Presentation spoon;fed by clinician   Order of Presentation x4 trials   Preparatory Phase Poor bolus control   Oral Phase Delayed AP movement;Effortful AP movement;Premature pharyngeal entry  (spillage to valleclae on 3/4, pyriform sinuses on 1/4 with larger bolus)   Pharyngeal Phase Delayed swallow reflex   Rosenbek's Penetration Aspiration Scale 3 - contrast remains above the vocal cords, visible residue remains (penetration)   Diagnostic Statement no laryngeal penetration or aspiration on 3/4 trials, x1 trace-min before penetration 2/2 larger bolus + increased premature bolus spillage   VFSS Evaluation: Puree Solid Texture Trial   Mode of Presentation, Puree spoon   Order of Presentation x2 trials   Preparatory Phase WFL   Oral Phase, Puree Delayed AP movement;Effortful AP movement;Premature pharyngeal entry  (spillage to valleculae)   Pharyngeal Phase, Puree Delayed swallow reflex   Rosenbek's Penetration Aspiration Scale: Puree Food Trial Results 1 - no aspiration, contrast does not enter airway   Diagnostic Statement WFL   Swallowing Recommendations   Diet Consistency Recommendations pureed (level 4);extremely thick liquids (level 4)   Supervision Level for Intake 1:1 supervision needed   Mode of Delivery Recommendations bolus size, small;food moistened;liquids via spoon only;slow rate of intake  (1/2 tsp sized for liquids)   Monitoring/Assistance Required (Eating/Swallowing) monitor for cough or change in vocal quality with intake   Recommended Feeding/Eating Techniques (Swallow Eval) maintain upright sitting position for eating;maintain upright posture during/after eating for 30 minutes;provide assist with feeding   Medication Administration Recommendations, Swallowing (SLP) crush with puree, can trial small pills whole with puree   General Therapy Interventions   Planned Therapy Interventions Dysphagia Treatment   Clinical Impression   Criteria for Skilled Therapeutic  Interventions Met (SLP Eval) Yes, treatment indicated   SLP Diagnosis mild oropharyngeal dysphagia   Risks & Benefits of therapy have been explained evaluation/treatment results reviewed;care plan/treatment goals reviewed;participants included;patient   Clinical Impression Comments   Video fluoroscopic swallow study completed with mildly thick liquids, moderately thick liquids, puree solids (pt's baseline diet = puree and extremely thick liquids). Pt currently presents with mild oropharyngeal dysphagia, greater oral deficits than pharyngeal. Oral deficits include: reduced lingual ROM and control, lingual pumping, reduced oral control and posterior oral containment resulting in premature bolus spillage, occassional piecemeal swallows specifically with larger liquid boluses. Base of tongue retraction and pharyngeal constriction WFL, minimally reduced hyolaryngeal elevation/excursion + epiglottic inversion, WFL upper esophageal sphincter relaxation during the swallow. No notable oropharyngeal bolus retention/residuals across consistencies. X1 instance of before aspiration (7, cough response) with mildly thick liquids and x1 instance of before penetration (3, above vocal cords with residue) with larger bolus of moderately thick liquids - penetration was a result of reduced oral control and premature bolus spillage to the  pyriform sinuses before pharyngeal swallow response initiated. With moderately thick: larger bolus size = piecemeal swallow and increased spillage. No laryngeal penetration on remaining 1/2 mildly thick, 3/4 moderately thick trials and none with puree either.     Dysphagia tx initiated following assessment in pt's room with clinical trials of moderately thick (x10 tsps), extremely thick (x3 tsps) and puree solids (x4 tsps) with strategies of fully upright, 1/2 tsp boluses. Pt clinically tolerated these consistencies without overt clinical signs/sx aspiration noted. Plan: re-initiate baseline diet of  puree/extremely thick with SLP follow up for potential upgrades to moderately thick.     SLP Discharge Planning   SLP Discharge Recommendation home with home care speech therapy   SLP Rationale for DC Rec Below baseline, recommend SLP services for return to least restrictive intake even for quality of life improvement   SLP Brief overview of current status  Recommendations: puree solids with extremely thick liquids, liquids via 1/2 tsp bolus sizes, slow pacing. Pt must be fully upright, encourage into chair for PO if able.    Total Evaluation Time   Total Evaluation Time (Minutes) 31   SLP Goals   Therapy Frequency (SLP Eval) 5 times/wk   SLP Predicted Duration/Target Date for Goal Attainment 07/26/22   SLP Goals Swallow   SLP: Safely tolerate diet without signs/symptoms of aspiration Pureed diet;Moderately thick liquids;With use of swallow precautions;With assistance/supervision

## 2022-07-19 NOTE — PROGRESS NOTES
SPIRITUAL HEALTH SERVICES Progress Note  [facility] Med/Surg 5    Consulted pt Blaze's  Sonia to assess pt's emotional/spiritual needs, with permission from his legal guardian.  Sonia reported the following:    Blaze is non-verbal and uses some sign language.  He likes company and expressed being lonely while in the hospital.    Blaze likes music from the 60's and 70's, such as the Beatles, and he enjoys [place] and Pixar movies.  He likes sports, especially baseball, and sometimes he watches channel 5 news.    Other activities that he likes include fidget-hand activities like fidget toys or bubble wrap, and he sometimes likes to color if he is having a good day and someone helps hold down the paper.      Sonia is not aware that Blaze has any spiritual beliefs or practices.    Blaze was watching cartoons when I introduced myself.  He presented as being lethargic.  Blaze indicated that he wanted to watch TV and declined having company, even to watch TV with him.    Plan: Will coordinate care with RN and Family/Child Life Specialist.      Jayden Agee M.Div., [facility]  Staff   Phone [phone]

## 2022-07-19 NOTE — PROGRESS NOTES
Hospitalist Medicine Progress Note   [facility]       Benito Marx is a 57 year old male with known history of cerebral palsy, partial quadriplegia, at baseline cannot stand to transfer but typically wheelchair-bound, dysphagia, on aspiration precautions with prior history of aspiration pneumonia, prior episodic bouts of nausea and vomiting, recent hospitalization for significant constipation that was reportedly improved with several reported BMs on last hospitalization, intellectual disability essentially nonverbal, malnutrition who presented back in the emergency room coming from his group home setting due to reported intolerance to oral diet with recurrent abdominal symptomatology of nausea and vomiting.  Patient did have fever and hypoxia chest x-ray was normal without showing any signs of pneumonia but a CT scan of the chest without contrast done showed left upper and lower lobe pneumonia possibly secondary to aspiration with hiatus hernia and large volume material in the left mainstem bronchus.  Unfortunately there were also incidental small pulmonary nodules seen with the largest measuring 4 mm in the right middle lobe which will need follow-up.  Patient was started on Unasyn 7/16/2022       Date of Admission:  7/10/2022  Assessment & Plan      Aspiration pneumonia  Not seen on chest x-ray but diagnosed on CT scan associated with Fever and Hypoxia  Patient's oxygen requirements have decreased from a peak of 10 L to none now  -Radial fluoroscopic swallow study showed dysphagia for which speech pathology has recommended puree solids with extremely thick liquids, liquids via 1/2 tsp bolus sizes, slow pacing    Metabolic encephalopathy  Probably on the basis of aspiration pneumonia  Unable to discuss with the patient regarding    Nausea vomiting with intolerance to oral food  Aspiration  With hiatus hernia  Patient is having aspiration  Appreciate speech pathology swallowing evaluation  and recommendations    Constipation  Patient has not had bowel movements for the past 7 days  Will provide Dulcolax suppository as well as discussed with the family/caretaker regarding feeding tube    Cerebral palsy   Malnutrition    Discussed in detail with Suzanne who is her guardian regarding what the patient would want or if there is any living will regarding feeding.  Suzanne states that she is out of state and would return on Thursday and look up if there is any advance directives in the file.  She wanted me also to talk to the group home to know if there is any advance directive.  Will await  Further recommendations from speech pathology regarding swallowing      Plan:   Monitor closely for autonomic dysfunction with patient having tachycardia as well as bradycardia, confusion  CBC and BMP in am   Check CPK in a.m.    Plan of care discussed with the patient's nurse and all the questions raised were answered      Diet: Diet  Combination Diet Pureed Diet (level 4); Extremely Thick (level 4) (1/2 tsps only; 1:1 assist; fully upright)    DVT Prophylaxis: Enoxaparin (Lovenox) SQ  Luke Catheter: Not present  Code Status: Full Code             .    Boris Brooks MD  Hospitalist Service  [facility]    ______________________________________________________________________    Interval History     Symptoms   Unable to obtain any history -but patient is more alert yet confused today     Review of Systems:   As above    Data reviewed today: I reviewed all medications, new labs and imaging results over the last 24 hours.     Physical Exam   Vital Signs: Temp: 97.5  F (36.4  C) Temp src: Axillary BP: 103/66 Pulse: 57   Resp: 20 SpO2: 96 % O2 Device: None (Room air)    Weight: 100 lbs 8 oz      GENERAL: Patient is not in acute distress  HEENT: EOM+,Conjunctiva is clear   NECK:  no Jugular Venous distention  HEART: S1 S2 bradycardia is present,   LUNGS: Respirations are not laboured, Lungs have decreased  breath sounds in the lung bases to auscultation without Crepitations or Wheezing   ABDOMEN: Soft, there is no tenderness ,Bowel Sounds are Positive   LOWER LIMBS: no Pedal Edema  Bilaterally   CNS:  Alert, unable to communicate, confused     Data   Recent Labs   Lab 07/19/22  1034 07/18/22  2358 07/18/22  1703 07/18/22  0800 07/17/22  0907 07/16/22  1726 07/16/22  0743 07/15/22  0639   WBC 3.4*  --   --   --  6.2 6.9  --  3.7*   HGB 9.4*  --   --   --  10.9* 11.6*  --  10.3*   MCV 87  --   --   --  87 86  --  86     --   --   --  195 208  --  166     --   --   --  144  --   --  139   POTASSIUM 3.3* 3.5 3.4   < > 3.8  --    < > 3.7   CHLORIDE 112*  --   --   --  113*  --   --  108   CO2 24  --   --   --  24  --   --  27   BUN 22  --   --   --  36*  --   --  15   CR 0.47*  --   --   --  0.54*  --   --  0.62*   ANIONGAP 7  --   --   --  7  --   --  4   ASTRID 8.1*  --   --   --  8.5  --   --  8.7   GLC 95  --   --   --  131*  --   --  93    < > = values in this interval not displayed.         Recent Results (from the past 24 hour(s))   XR Video Swallow with SLP or OT    Narrative    VIDEO SWALLOW WITH SLP OR OT   7/19/2022 1:39 PM     HISTORY: Cerebral palsy, NPO currently.    COMPARISON: Chest CT on 7/17/2022.    FINDINGS: A swallow study was performed in coordination with a member  from the speech pathology service. Total fluoroscopy time was 1.2  minutes. 15 spot fluoroscopic images, and/or cine clips were obtained.  1 view in lateral projection. Various consistencies of barium were  given to the patient to swallow, and the swallowing mechanism was  observed using fluoroscopy.    Penetration without aspiration is noted with moderately thick barium.  Satya aspiration is noted with mildly thick barium. No penetration or  aspiration is noted with pudding mixed barium.      Impression    IMPRESSION:  1. Flank aspiration was noted with mildly thick barium. Penetration  without aspiration is noted with  moderately thick barium. No  penetration or aspiration is noted with pudding mixed barium.  2. Please refer to the speech pathology report for further details.    This study includes only the cervical esophagus.    KAREN FARR MD         SYSTEM ID:  [id number]

## 2022-07-19 NOTE — CONSULTS
"Child Life consulted by doctor to provide normalizing and appropriate activities for pt.    CCLS conversed with pt and Sonia from pt's group home () who came to visit and decorate pt's room for his birthday.  Sonia relayed the following:    -Pt is non verbal, however can communicate using \"Option 1 or Option 2\"   Example:  \"Would you like to 1. Watch a movie or 2. Take a nap?\"   Example:  \"Would you like to sit up?  1. Yes or 2. No?  *Watch pt's fingers closely as his hands/fingers are unsteady.  Learn the difference between \"1\" and \"2\".    -Pt can understand everything, his receptive language is good.  He can not express but in signing or in choosing options or in his behaviors.    -Pt also signs for the words coffee, ice cream, bathroom and \"call doctor\".  After he signs for \"call doctor\" he will then point to an area of his body that hurts.    -Pt enjoys Pixar movies, Vincent movies, listening to music especially at night to sleep.  Pt also loves decaf coffee and ice cream.  Pt was brought a few movies from the Peds floor.  Pt signed asking for coffee and ice cream.  This writer asked if pt knew why he could not have these things (but in small bites occasionally) and pt said \"2=no\".  This writer offered education which was accepted by pt and after conversing with pt's nurse to understand medical needs, CCLS relayed this information to pt who then reported that \"YES\" he understood.    -Pt also enjoys feeding paper into a shredder, fidget balls and bubble wrap.  Squeeze ball and bubble wrap were provided.    -Sonia () reported that pt can become upset when he feels he is not being listened to or seen.  Being upset can look like:     -\"open throat\" yelling in which sound may or may not come out    -grabbing for/at staff's arms or hair   -sticking his fingers down his throat to make himself throw up   -pt will also hit the call button    -The best way to address this upset is by verbally and visually " "validating the pt and communicating with him to understand needs.  Example:  \"Blaze, I can see you're upset.  Help me understand what you need.\"    Then go through different options for reasons, two at a time until discovered.    -Per Sonia () pt is less expressive and interactive than normal.  Pt does smile at things and enjoys funny or\" trickster\" things.    -Sonia also relayed that pt feels lonely and will benefit from visits most of all.    -This writer wrote up this information in an \"All About Blaze\" sheet and fellow CCLS delivered this to 5th floor for staff education/information handoff.  Additional copies were sent with the intent of being hung outside or inside pt's room so all who work with him can understand how to communicate for him.    CCLS handed off information to evening CFL staff and CAP-CCLS K. [place].  No further needs at this time.     "

## 2022-07-20 NOTE — PLAN OF CARE
Goal Outcome Evaluation:      Pertinent assessments: Assumed care of pt from [id number]. VSS. Alert for most of shift. Apical pulse regular. Lungs diminished. Bowel sounds active in all quadrants. Coughing with eating, emesis x2. Voiding spontaneously with adequate output. MIVF running at 75cc/hr. No non-verbal signs of pain. Group home staff and sister at bedside for care conference to discuss feeding tube possibilities due to dysphagia.    Major Shift Events: Care conference with staff, awaiting input from legal guardian on 7-21/7-22 regarding a G-tube    Treatment Plan: continue with supportive cares, repo q2h, monitor I/Os

## 2022-07-20 NOTE — PROGRESS NOTES
Hospitalist Medicine Progress Note   [facility]       Benito Marx is a 57 year old male with known history of cerebral palsy, partial quadriplegia, at baseline cannot stand to transfer but typically wheelchair-bound, dysphagia, on aspiration precautions with prior history of aspiration pneumonia, prior episodic bouts of nausea and vomiting, recent hospitalization for significant constipation that was reportedly improved with several reported BMs on last hospitalization, intellectual disability essentially nonverbal, malnutrition who presented back in the emergency room coming from his group home setting due to reported intolerance to oral diet with recurrent abdominal symptomatology of nausea and vomiting.  Patient did have fever and hypoxia chest x-ray was normal without showing any signs of pneumonia but a CT scan of the chest without contrast done showed left upper and lower lobe pneumonia possibly secondary to aspiration with hiatus hernia and large volume material in the left mainstem bronchus.  Unfortunately there were also incidental small pulmonary nodules seen with the largest measuring 4 mm in the right middle lobe which will need follow-up.  Patient was started on Unasyn 7/16/2022  Discussed with patient's guardian Zita who was out of state and was coming back on Thursday, 7/21/2022 and would look up in records regarding feeding tube options and what his healthcare directive say.  Had a long talk with the patient's sister on 7/20/2022 as well as the group home he lives in. Continues to be confused and neurology was consulted       Date of Admission:  7/10/2022  Assessment & Plan      Aspiration pneumonia  Not seen on chest x-ray but diagnosed on CT scan associated with Fever and Hypoxia  Patient's oxygen requirements have decreased from a peak of 10 L to none now  -Radial fluoroscopic swallow study showed dysphagia for which speech pathology has recommended puree solids with  extremely thick liquids, liquids via 1/2 tsp bolus sizes, slow pacing    Metabolic encephalopathy  Probably on the basis of aspiration pneumonia  Unfortunately patient is able to open eyes though not engaging in a discussion regarding his healthcare  No abnormality seen on CT scan of the head   Will also consult neurology in this regard    Nausea vomiting with intolerance to oral food  Aspiration  With hiatus hernia  Patient is having aspiration  Appreciate speech pathology swallowing evaluation and recommendations    Constipation  Patient has not had bowel movements for the past 7 days  Will provide Dulcolax suppository as well as discussed with the family/caretaker regarding feeding tube    Cerebral palsy   Malnutrition    Discussed in detail with Suzanne who is her guardian regarding what the patient would want or if there is any living will regarding feeding.  Suzanne states that she is out of state and would return on Thursday and look up if there is any advance directives in the file.  She wanted me also to talk to the group home to know if there is any advance directive.  Will await  Further recommendations from speech pathology regarding swallowing      Plan:   Decide with the patient's guardian Suzanne/Kerri tomorrow 7/21/2022 if PEG tube can be placed for feeding.   Await neurology consultation and recommendations  CBC and BMP in the morning    Diet: Diet  Combination Diet Pureed Diet (level 4); Extremely Thick (level 4) (1/2 tsps only; 1:1 assist; fully upright)    DVT Prophylaxis: Enoxaparin (Lovenox) SQ  Luke Catheter: Not present  Code Status: Full Code             .    Boris Brooks MD  Hospitalist Service  [facility]    ______________________________________________________________________    Interval History     Symptoms   Unable to obtain any history -but patient is more alert yet confused today     Review of Systems:   As above    Data reviewed today: I reviewed all  medications, new labs and imaging results over the last 24 hours.     Physical Exam   Vital Signs: Temp: 97.8  F (36.6  C) Temp src: Axillary BP: 106/51 Pulse: 68   Resp: 16 SpO2: 90 % O2 Device: None (Room air)    Weight: 97 lbs 12.8 oz      GENERAL: Patient is not in acute distress  HEENT: EOM+,Conjunctiva is clear   NECK:  no Jugular Venous distention  HEART: S1 S2 RRR  LUNGS: Respirations are not laboured, Lungs have decreased breath sounds in the lung bases to auscultation without Crepitations or Wheezing   ABDOMEN: Soft, there is no tenderness ,Bowel Sounds are Positive   LOWER LIMBS: no Pedal Edema  Bilaterally   CNS:  Alert, unable to communicate, confused     Data   Recent Labs   Lab 07/20/22  0823 07/19/22  1807 07/19/22  1034 07/18/22  0800 07/17/22  0907   WBC 3.9*  --  3.4*  --  6.2   HGB 10.0*  --  9.4*  --  10.9*   MCV 86  --  87  --  87     --  172  --  195     --  143  --  144   POTASSIUM 3.5 3.6 3.3*   < > 3.8   CHLORIDE 110*  --  112*  --  113*   CO2 25  --  24  --  24   BUN 16  --  22  --  36*   CR 0.48*  --  0.47*  --  0.54*   ANIONGAP 5  --  7  --  7   ASTRID 8.3*  --  8.1*  --  8.5   *  --  95  --  131*    < > = values in this interval not displayed.         Recent Results (from the past 24 hour(s))   XR Video Swallow with SLP or OT    Narrative    VIDEO SWALLOW WITH SLP OR OT   7/19/2022 1:39 PM     HISTORY: Cerebral palsy, NPO currently.    COMPARISON: Chest CT on 7/17/2022.    FINDINGS: A swallow study was performed in coordination with a member  from the speech pathology service. Total fluoroscopy time was 1.2  minutes. 15 spot fluoroscopic images, and/or cine clips were obtained.  1 view in lateral projection. Various consistencies of barium were  given to the patient to swallow, and the swallowing mechanism was  observed using fluoroscopy.    Penetration without aspiration is noted with moderately thick barium.  Satya aspiration is noted with mildly thick barium. No  penetration or  aspiration is noted with pudding mixed barium.      Impression    IMPRESSION:  1. Flank aspiration was noted with mildly thick barium. Penetration  without aspiration is noted with moderately thick barium. No  penetration or aspiration is noted with pudding mixed barium.  2. Please refer to the speech pathology report for further details.    This study includes only the cervical esophagus.    KAREN FARR MD         SYSTEM ID:  [id number]

## 2022-07-20 NOTE — PLAN OF CARE
Goal Outcome Evaluation:     To Do:  End of Shift Summary  For vital signs and complete assessments, please see documentation flowsheets.     Pertinent assessments: Non verbal. Signs w/numbers 1 & 2 for yes & no. VSS, tolerating RA. Yankauer suction performed when patient coughs. External cath in place. Mitts on. Repositioning frequently.    Major Shift Events: Video swallow completed- resume PTA diet. K+ replaced. Re-check: 3.6.     Treatment Plan: Unasyn, electrolyte replacement, and IVF. Awaiting discharge plans.     Bedside Nurse: Jazmyn Gilbert RN

## 2022-07-20 NOTE — PLAN OF CARE
To Do:  End of Shift Summary  For vital signs and complete assessments, please see documentation flowsheets.     Pertinent assessments: Non verbal. VSS, pt restless at times, had emesis x2 after eating pudding, no coughing noted, zofran given. Incontinent urine, external cath in place.     Major Shift Events: Uneventful    Treatment Plan: Unasyn, electrolyte replacement, and IVF.      Bedside Nurse: Gilma Bryan RN

## 2022-07-21 NOTE — CHILD FAMILY LIFE
CCLS met with pt for a supportive check-in.  Pt was being attended to by staff who were preparing coffee for pt.  Pt appeared more relaxed than last visit and reported he was feeling good using the 1-2 scale.  CCLS asked about his eating and per staff pt had been able to keep food down with no vomiting.  Pt smiled at this and smiled again when this writer expressed excitement for him.  No further needs at this time.

## 2022-07-21 NOTE — PROGRESS NOTES
[facility]    Hospitalist Progress Note  Name: Benito Marx    MRN: [id number]  Provider:  Tyler Espinosa DO MPH  Date of Service: 07/21/2022    Summary of Stay: Benito Marx is a 57 year old male with known history of cerebral palsy, partial quadriplegia, at baseline cannot stand to transfer but typically wheelchair-bound, dysphagia, on aspiration precautions with prior history of aspiration pneumonia, prior episodic bouts of nausea and vomiting, recent hospitalization for significant constipation that was reportedly improved with several reported BMs on last hospitalization, intellectual disability essentially nonverbal, malnutrition who presented back in the emergency room coming from his group home setting due to reported intolerance to oral diet with recurrent abdominal symptomatology of nausea and vomiting.  Patient did have fever and hypoxia chest x-ray was normal without showing any signs of pneumonia but a CT scan of the chest without contrast done showed left upper and lower lobe pneumonia possibly secondary to aspiration with hiatus hernia and large volume material in the left mainstem bronchus.  Unfortunately there were also incidental small pulmonary nodules seen with the largest measuring 4 mm in the right middle lobe which will need follow-up.  Patient was started on Unasyn 7/16/2022  Discussed with patient's guardian Zita who was out of state and was coming back on Thursday, 7/21/2022 and would look up in records regarding feeding tube options and what his healthcare directive say.  Had a long talk with the patient's sister on 7/20/2022 as well as the group home he lives in. Continues to be confused and neurology was consulted    Problem List:   1. Acute hypoxic respiratory failure secondary to aspiration pneumonia: No infiltrate seen on chest x-ray but CT scan did show infiltrate as well as fever and hypoxia on admission.  Now doing well on room air.  SLP consulted and  restricted diet ordered.  Per my discussion with SLP today he is at baseline diet.  Continue Unasyn.  Stop IVF.  2. Metabolic encephalopathy: Likely due to infection.  Currently fully awake and alert.  Neurology consulted.  Patient is back to baseline.  3. Nausea and vomiting: Appears improved.  Does have hiatal hernia.  Diet as tolerated.  4. Cerebral palsy: Currently baseline neurological function.    DVT Prophylaxis: Pneumatic Compression Devices  Code Status: Full Code  Diet: Diet  Combination Diet Pureed Diet (level 4); Extremely Thick (level 4) (1/2 tsps only; 1:1 assist; fully upright)    Luke Catheter: Not present  Disposition: Expected discharge in 1-2 days to home. Goals prior to discharge include monitor mental and respiratory status.   Incidental Findings: None.  Family updated today: Attempted to call guardian Suzanne, no answer, brief confidential VM left.     Interval History   Assumed care from previous hospitalist. The history was fully reviewed.  Non verbal. Spoke with RN and SLP.    -Data reviewed today: I personally reviewed all new labs and imaging results over the last 24 hours.     Physical Exam   Temp: 98  F (36.7  C) Temp src: Axillary BP: 90/53 Pulse: 68   Resp: 18 SpO2: 96 % O2 Device: None (Room air)    Vitals:    07/18/22 0700 07/19/22 0527 07/20/22 0425   Weight: 43.6 kg (96 lb 3.4 oz) 45.6 kg (100 lb 8 oz) 44.4 kg (97 lb 12.8 oz)     Vital Signs with Ranges  Temp:  [98  F (36.7  C)-98.8  F (37.1  C)] 98  F (36.7  C)  Pulse:  [57-71] 68  Resp:  [16-18] 18  BP: ()/(52-66) 90/53  SpO2:  [90 %-100 %] 96 %  I/O last 3 completed shifts:  In: 220 [P.O.:220]  Out: 1750 [Urine:1750]    GENERAL: No apparent distress. Awake, alert, nonverbal.  HEENT: Normocephalic, atraumatic. Extraocular movements intact.  CARDIOVASCULAR: Regular rate and rhythm without murmurs or rubs. No S3.  PULMONARY: Clear bilaterally.  GASTROINTESTINAL: Soft, non-tender, non-distended. Bowel sounds normoactive.    EXTREMITIES: No cyanosis or clubbing. No edema.  NEUROLOGICAL: CN 2-12 grossly intact, baseline neurological deficits.  DERMATOLOGICAL: No rash, ulcer, bruising, nor jaundice.     Medications     dextrose 5% and 0.45% NaCl + KCl 20 mEq/L 75 mL/hr at 07/21/22 0938       albuterol  2.5 mg Nebulization BID     ampicillin-sulbactam (UNASYN) IV  3 g Intravenous Q6H     cetirizine  10 mg Oral Daily     diclofenac  1 g Topical BID     fluticasone  2 spray Both Nostrils Daily     magnesium hydroxide  30 mL Oral Daily     [Held by provider] montelukast  10 mg Oral At Bedtime     [Held by provider] OLANZapine  2.5 mg Oral At Bedtime     OLANZapine zydis  2.5 mg Oral At Bedtime     olopatadine  1 drop Left Eye BID     [Held by provider] pantoprazole  40 mg Oral BID AC     pantoprazole (PROTONIX) IV  40 mg Intravenous Daily with breakfast     polyethylene glycol  17 g Oral BID     sennosides  2 tablet Oral BID     Sod Fluoride-Potassium Nitrate   Dental Daily     sodium chloride (PF)  3 mL Intracatheter Q8H     tiZANidine  4 mg Oral TID     Vitamin D3  1,000 Units Oral Daily     Data     Laboratory:  Recent Labs   Lab 07/21/22  0637 07/20/22  0823 07/19/22  1034   WBC 3.3* 3.9* 3.4*   HGB 9.6* 10.0* 9.4*   HCT 32.8* 33.6* 31.5*   MCV 89 86 87    165 172     Recent Labs   Lab 07/21/22  0637 07/20/22  0823 07/19/22  1807 07/19/22  1034 07/18/22  0800 07/17/22  0907   NA  --  140  --  143  --  144   POTASSIUM 3.7 3.5 3.6 3.3*   < > 3.8   CHLORIDE  --  110*  --  112*  --  113*   CO2  --  25  --  24  --  24   ANIONGAP  --  5  --  7  --  7   GLC  --  116*  --  95  --  131*   BUN  --  16  --  22  --  36*   CR  --  0.48*  --  0.47*  --  0.54*   GFRESTIMATED  --  >90  --  >90  --  >90   ASTRID  --  8.3*  --  8.1*  --  8.5    < > = values in this interval not displayed.     No results for input(s): CULT in the last 168 hours.    Imaging:  Recent Results (from the past 24 hour(s))   CT Head w/o Contrast    Narrative    CT HEAD W/O  CONTRAST 7/20/2022 2:03 PM    INDICATION: CONFUSION  TECHNIQUE: CT scan of the head without contrast. Dose reduction  techniques were used.  CONTRAST: None.  COMPARISON: Head CT 9/29/2021    FINDINGS:   Motion degraded exam. No definite infarct or hemorrhage. The  ventricles and sulci are normal for age. Unremarkable orbits.  Paranasal sinuses are free of significant disease. Clear mastoid air  cells.      Impression    IMPRESSION:  Considerably motion degraded exam. No definite acute abnormality is  evident. The examination is not sensitive for subtle abnormality.    ALEXEI REAVES MD         SYSTEM ID:  [id number]         Tyler Espinosa DO MPH  Formerly [facility] Hospitalist  [address] [facility] [place] [address]  07/21/2022

## 2022-07-21 NOTE — CHILD FAMILY LIFE
"CCLS provided supportive check-in to pt.  Upon entering room, this writer greeted pt who was being attended to by RN.  Pt was making his \"open mouth yell\" gesture, signaling that he was upset.  CCLS went to pt's side, validated his upset and used 1-2 to determine that pt was angry (vs. In pain.)  RN reviewed pt's current condition and how pt had vomite several times with eating so eating had been put on hold.  Pt continued to make \"ice cream\" sign and \"open mouth yell\", continuing to be validated by RN and this writer.  Pt even verbalized what sounded like the word \"yes\" at a few points.  RN provided honest, kind and direct information to pt while validating the importance of his quality of life.  Pt gradually settled and plan was made with RN for a small amount of apple sauce to be given.  Pt then requested movie be turned off and TV be turned on by this writer.  No further needs at this time.  "

## 2022-07-21 NOTE — PROGRESS NOTES
End of Shift Summary  For vital signs and complete assessments, please see documentation flowsheets.     Pertinent assessments: Pt agitated/restless/anxious at start of shift, ativan given x1, with relief. Pt ate all of breakfast & lunch w/ intermittent coughing. No pain noted. BS hypoactive. LS diminished, RA. HS regular. Repo q2 hrs. Soft bp 90/54, relayed to md, appears comfortabl other vitals ok.     Major Shift Events agitation/anxiety.    Treatment Plan: IV zosyn, ativan as needed, pain management, repo q2 hours     Bedside Nurse: Carmen Subramanian RN

## 2022-07-21 NOTE — PLAN OF CARE
Goal Outcome Evaluation:    [facility] [id number]. Pt at first alert, later sleeping. Patient non-verbal. Does not appear in pain, and is breathing comfortably. At times patient is restless, especially after receiving an eye drop. Patient settled in few minutes. Vitals stable. Patient ate apple sauce for dinner with no issues.     However, around midnight patient noted to be trying to cough up secretions, but unable. His cough is weak. When the writer attempted to suction the patient per mouth, he started gagging and had small emesis. As soon as the writer suctions again, patient vomits. So, the writer stopped and patient stopped vomiting. So, unsure if at times suctioning provokes vomiting.     Otherwise, patient has been quiet, and slept ok. Has been incontinent of urine with external catheter.

## 2022-07-21 NOTE — CONSULTS
Chart reviewed. Talked to his care attendants. Patient is back to his baseline per attendants. He may be slightly slow due to systemic infection in the setting of severe cerebral palsy. No neuro intervention needed.  Call if any concern.

## 2022-07-22 PROBLEM — R09.02 HYPOXIA: Status: ACTIVE | Noted: 2022-01-01

## 2022-07-22 PROBLEM — J69.0 ASPIRATION PNEUMONITIS (H): Status: ACTIVE | Noted: 2022-01-01

## 2022-07-22 NOTE — PLAN OF CARE
Assessments: Alert, some restless episodes, Ativan given x1. No SoB. Good appetite with some coughing. No BM during shift. External catheter in place, good output. Frequently repositioned in bed as patient frequently shifts his weight.     Treatment Plan: Unasyn, aspiration and fall precautions    Bedside Nurse: Jackson De La O RN

## 2022-07-22 NOTE — DISCHARGE SUMMARY
Hospitalist Discharge Summary  [facility]    Benito Marx MRN# [id number]   YOB: 1965 Age: 57 year old     Date of Admission:  7/10/2022  Date of Discharge:  7/22/2022  Admitting Physician:  Xavi Santana MD  Discharge Physician:  Tyler Espinosa DO  Discharging Service:  Hospitalist     Primary Provider: Pily Blackmon          Discharge Diagnosis:     Acute hypoxic respiratory failure  Aspiration pneumonia  Incidental low risk pulmonary nodule  Metabolic encephalopathy  Nausea and vomiting  Cerebral palsy  Partial quadriplegia             Discharge Disposition:     Discharged to home           Allergies:     Allergies   Allergen Reactions     Nutritional Supplements Nausea and Vomiting     increased vomiting when taking this.     Seasonal Allergies               Discharge Medications:     Current Discharge Medication List      START taking these medications    Details   amoxicillin-clavulanate (AUGMENTIN) 875-125 MG tablet Take 1 tablet by mouth 2 times daily for 5 days  Qty: 10 tablet, Refills: 0    Associated Diagnoses: Aspiration pneumonia due to vomit, unspecified laterality, unspecified part of lung (H)         CONTINUE these medications which have NOT CHANGED    Details   albuterol (PROVENTIL) (2.5 MG/3ML) 0.083% neb solution Take 2.5 mg by nebulization 2 times daily       bisacodyl (DULCOLAX) 10 MG suppository Place 1 suppository (10 mg) rectally daily as needed for constipation  Qty: 30 suppository, Refills: 0    Associated Diagnoses: Fecal impaction (H)      carbamide peroxide (DEBROX) 6.5 % otic solution Place 5-10 drops into both ears every 30 days      cetirizine (ZYRTEC) 10 MG tablet Take 10 mg by mouth daily      cholecalciferol 25 MCG (1000 UT) TABS Take 1,000 Units by mouth daily      citalopram (CELEXA) 40 MG tablet Take 40 mg by mouth daily      diclofenac (VOLTAREN) 1 % topical gel Apply 1 g topically 2 times daily . To each knee      fluticasone  (FLONASE) 50 MCG/ACT nasal spray Spray 2 sprays into both nostrils daily      LORazepam (ATIVAN) 0.5 MG tablet Take 0.5 mg by mouth 2 times daily as needed for agitation or anxiety      magnesium hydroxide (MILK OF MAGNESIA) 400 MG/5ML suspension Take 30 mLs by mouth daily      montelukast (SINGULAIR) 10 MG tablet Take 10 mg by mouth At Bedtime      OLANZapine (ZYPREXA) 2.5 MG tablet Take 2.5 mg by mouth At Bedtime      olopatadine (PATANOL) 0.1 % ophthalmic solution Place 1 drop Into the left eye 2 times daily      ondansetron (ZOFRAN ODT) 4 MG ODT tab Take 1 tablet (4 mg) by mouth every 4 hours as needed for nausea or vomiting  Qty: 20 tablet, Refills: 0    Associated Diagnoses: Intractable vomiting, presence of nausea not specified, unspecified vomiting type; Fecal impaction (H)      pantoprazole (PROTONIX) 20 MG EC tablet Take 20 mg by mouth 2 times daily      polyethylene glycol (MIRALAX) 17 GM/Dose powder Take 17 g by mouth 2 times daily Can increase up to 3times a day if no BM with BID dosing  Qty: 510 g, Refills: 0    Associated Diagnoses: Fecal impaction (H)      senna (SENOKOT) 8.6 MG tablet Take 2 tablets by mouth 2 times daily       sodium fluoride dental gel (PREVIDENT) 1.1 % GEL topical gel Apply to affected area At Bedtime      tiZANidine (ZANAFLEX) 4 MG tablet Take 4 mg by mouth 3 times daily                    Condition on Discharge:     Discharge condition: Stable   Discharge vitals: Blood pressure 122/78, pulse 61, temperature 98.7  F (37.1  C), temperature source Axillary, resp. rate 18, weight 45.2 kg (99 lb 9.6 oz), SpO2 97 %.   Code status on discharge: Full Code      BASIC PHYSICAL EXAMINATION:  GENERAL: No apparent distress.  CARDIOVASCULAR: Regular rate and rhythm without murmurs.  PULMONARY: Clear to auscultation bilaterally.   GASTROINTESTINAL: Abdomen soft, non-tender.  EXTREMITIES: No edema, pulses intact.  NEUROLOGIC: Baseline neurological deficits.            History of Illness:   See  detailed admission note for full details.               Procedures excluding imaging which is summarized below:     Please see details in the electronic medical record.           Consultations:     GASTROENTEROLOGY IP CONSULT  NUTRITION SERVICES ADULT IP CONSULT  CARE MANAGEMENT / SOCIAL WORK IP CONSULT  SOCIAL WORK IP CONSULT  SPEECH LANGUAGE PATH ADULT IP CONSULT  CHILD FAMILY LIFE IP CONSULT  NEUROLOGY IP CONSULT          Significant Results:     Results for orders placed or performed during the hospital encounter of 07/10/22   XR Abdomen Port 1 View    Narrative    EXAM: XR ABDOMEN PORT 1 VIEWS  LOCATION: [facility]  DATE/TIME: 7/10/2022 3:10 PM    INDICATION: Intractable nausea, vomiting  COMPARISON: 7/5/2022      Impression    IMPRESSION: Small amount of stool. Nonspecific bowel gas pattern. Findings similar to previous.   XR Chest 1 View    Narrative    EXAM: XR CHEST 1 VIEW  LOCATION: [facility]  DATE/TIME: 7/16/2022 7:40 PM    INDICATION: fever hypoxia  COMPARISON: 07/05/2022 and CT abdomen and pelvis 07/05/2022      Impression    IMPRESSION: Heart size prominent on this AP view. Pulmonary vascularity normal. Esophageal hiatal hernia, better appreciated on CT. Lungs otherwise clear. No new infiltrates or effusions. Scoliosis.   CT Chest w/o Contrast    Narrative    EXAM: CT CHEST W/O CONTRAST  LOCATION: [facility]  DATE/TIME: 7/17/2022 2:17 PM    INDICATION: hypoxia and fever   CXR does not show any Pneumonia  COMPARISON: Chest radiograph 07/16/2022  TECHNIQUE: CT chest without IV contrast. Multiplanar reformats were obtained. Dose reduction techniques were used.  CONTRAST: None.    FINDINGS:   LUNGS AND PLEURA: Patchy airspace disease in the left upper lobe with more confluent consolidation in the left lower lobe. The left mainstem bronchus is completely impacted with internal frothy material with resultant left sided volume loss. No  pleural   effusion or pneumothorax. Incidental small right pulmonary nodules, largest measuring 4 mm in the right middle lobe (series 5 image 145).    MEDIASTINUM/AXILLAE: No lymphadenopathy. No thoracic aortic aneurysm. Moderate hiatal hernia with internal fluid and debris in the proximal esophagus.    CORONARY ARTERY CALCIFICATION: None.    UPPER ABDOMEN: No significant finding. Prior cholecystectomy.    MUSCULOSKELETAL: No acute bony abnormality.      Impression    IMPRESSION:   1.  Left upper and lower lobe pneumonia, possibly secondary to aspiration given hiatal hernia and large volume of material in the left mainstem bronchus.  2.  Incidental small pulmonary nodules, largest measuring 4 mm in the right middle lobe. Consider follow-up described below.    REFERENCE:  Guidelines for Management of Incidental Pulmonary Nodules Detected on CT Images: From the Fleischner Society 2017.   Guidelines apply to incidental nodules in patients who are 35 years or older.  Guidelines do not apply to lung cancer screening, patients with immunosuppression, or patients with known primary cancer.    MULTIPLE NODULES  Nodule size <6 mm  Low-risk patients: No follow-up needed.  High-risk patients: Optional follow-up at 12 months.         XR Video Swallow with SLP or OT    Narrative    VIDEO SWALLOW WITH SLP OR OT   7/19/2022 1:39 PM     HISTORY: Cerebral palsy, NPO currently.    COMPARISON: Chest CT on 7/17/2022.    FINDINGS: A swallow study was performed in coordination with a member  from the speech pathology service. Total fluoroscopy time was 1.2  minutes. 15 spot fluoroscopic images, and/or cine clips were obtained.  1 view in lateral projection. Various consistencies of barium were  given to the patient to swallow, and the swallowing mechanism was  observed using fluoroscopy.    Penetration without aspiration is noted with moderately thick barium.  Satya aspiration is noted with mildly thick barium. No penetration  or  aspiration is noted with pudding mixed barium.      Impression    IMPRESSION:  1. Flank aspiration was noted with mildly thick barium. Penetration  without aspiration is noted with moderately thick barium. No  penetration or aspiration is noted with pudding mixed barium.  2. Please refer to the speech pathology report for further details.    This study includes only the cervical esophagus.    KAREN FARR MD         SYSTEM ID:  [id number]   CT Head w/o Contrast    Narrative    CT HEAD W/O CONTRAST 7/20/2022 2:03 PM    INDICATION: CONFUSION  TECHNIQUE: CT scan of the head without contrast. Dose reduction  techniques were used.  CONTRAST: None.  COMPARISON: Head CT 9/29/2021    FINDINGS:   Motion degraded exam. No definite infarct or hemorrhage. The  ventricles and sulci are normal for age. Unremarkable orbits.  Paranasal sinuses are free of significant disease. Clear mastoid air  cells.      Impression    IMPRESSION:  Considerably motion degraded exam. No definite acute abnormality is  evident. The examination is not sensitive for subtle abnormality.    ALEXEI REAVES MD         SYSTEM ID:  [id number]       Transthoracic Echocardiogram Results:  No results found for this or any previous visit (from the past 4320 hour(s)).             Pending Results:     Unresulted Labs Ordered in the Past 30 Days of this Admission     No orders found from 6/10/2022 to 7/11/2022.                      Discharge Instructions and Follow-Up:     Discharge instructions and follow-up:   Discharge Procedure Orders   Reason for your hospital stay   Order Comments: Patient came in with nausea and vomiting which improved     Follow-up and recommended labs and tests    Order Comments: Follow up with primary care provider, Pily Blackmon, within 7 days for hospital follow- up.  No follow up labs or test are needed.     Activity   Order Comments: Your activity upon discharge: activity as tolerated, per previous group home orders.      Order Specific Question Answer Comments   Is discharge order? Yes      Full Code     Order Specific Question Answer Comments   Code status determined by: AD/POLST (patient decision maker unavailable)      Diet   Order Comments: Follow this diet upon discharge: Orders Placed This Encounter      Diet      Combination Diet Pureed Diet (level 4); Extremely Thick (level 4) (1/2 tsps only; 1:1 assist; fully upright)     Order Specific Question Answer Comments   Is discharge order? Yes              Hospital Course:     Benito Marx is a 57 year old male with known history of cerebral palsy, partial quadriplegia, at baseline cannot stand to transfer but typically wheelchair-bound, dysphagia, on aspiration precautions with prior history of aspiration pneumonia, prior episodic bouts of nausea and vomiting, recent hospitalization for significant constipation that was reportedly improved with several reported BMs on last hospitalization, intellectual disability essentially nonverbal, malnutrition who presented back in the emergency room coming from his group home setting due to reported intolerance to oral diet with recurrent abdominal symptomatology of nausea and vomiting.      Patient did have fever and hypoxia on presentation to the ED.  Chest x-ray was normal, without showing any signs of pneumonia but a CT scan of the chest without contrast done showed left upper and lower lobe pneumonia possibly secondary to aspiration with hiatus hernia and large volume material in the left mainstem bronchus.  There were also incidental small pulmonary nodules seen with the largest measuring 4 mm in the right middle lobe, but he is a low risk patient so per radiology does not require follow-up surveillance.  Patient was started on Unasyn 7/16/2022.    He was initially requiring significant amounts of oxygen.  He was actually able to quickly taper off his oxygen and has been on room air the past 2 days.  He was eval by SLP and  restricted diet has been started and it is currently his baseline diet.  I discussed the case with his guardian and we will proceed with discharge back to his group home today.    The patient was seen, examined, and counseled on this day. The hospitalization and plan of care was reviewed with the patient's guardian extensively. All questions were addressed and the patient agreed to follow-up as noted above.      Total time spent in face to face contact with the patient and coordinating discharge was:  35 Minutes    Tyler Espinosa DO, MPH  [facility] Hospitalist  [address] [place] [address]  07/22/2022

## 2022-07-22 NOTE — PLAN OF CARE
Pertinent assessments: Alert. Slept throughout night. No nonverbal pain indicators noted. Soft BP otherwise VSS. On RA. External cath with adequate output. Repositioned q2hrs.    Major Shift Events: Uneventful    Treatment Plan: IV Unasyn, Aspiration precautions

## 2022-07-22 NOTE — PLAN OF CARE
"Speech Language Therapy Discharge Summary    Reason for therapy discharge:    Discharged to home.    Progress towards therapy goal(s). See goals on Care Plan in [facility] electronic health record for goal details.  Goals partially met.  Barriers to achieving goals:   discharge from facility.    Therapy recommendation(s):    Pt discharged on baseline diet based on VFSS completed 7/19/22: \"puree solids with extremely thick liquids, liquids via 1/2 tsp bolus sizes, slow pacing. Pt must be fully upright, encourage into chair for PO if able.\" Consider  SLP if clinical trials of advancements are to be pursued.        "

## 2022-07-22 NOTE — CONSULTS
Care Management Discharge Note    Discharge Date: 07/22/2022       Discharge Disposition: Group Home    Discharge Services: None    Discharge DME: None    Discharge Transportation: other (see comments) (Group Home staff)    Private pay costs discussed: Not applicable    PAS Confirmation Code:  (N/A)  Patient/family educated on Medicare website which has current facility and service quality ratings: other (see comments) (N/A)    Education Provided on the Discharge Plan:  yes  Persons Notified of Discharge Plans: guardian, Suzanne and  staff- Ida  Patient/Family in Agreement with the Plan: yes    Handoff Referral Completed: No    Additional Information:  Patient has discharge orders. He resides at [facility], [place]. Called and left voice message for patient's Guardian, Suzanne, ph#[phone].     Spoke with  nurse, Rebecca, ph#[phone]. She deferred to  Director, Ida, ph# [phone]. Spoke with  director.  would like new Rx faxed to [facility].     will provide transportation.  transportation contact is Sonia ph#[phone]. She verifies group [place] will bring patient's electric wheelchair and be available ~1330 for transportation.     Nursing notified of transport time.       Obie Bradford RN Case Manager  Inpatient Care Coordination   [facility]   [phone]             Obie Bradford RN

## 2022-07-22 NOTE — PROGRESS NOTES
Pt to D/C to group home.  Pt provided with d/c instructions, including new medications, when medications were last given, and when to take them again.  All questions were answered at this time.  Copy of paperwork sent with pt.  Medication/Scripts  sent with pt.  Group home to provide transport.  All personal belongings sent with pt. Spoke w/ joanne who was present at discharge and answered any questions she had as pt is nonverbal.

## 2022-07-23 NOTE — PROGRESS NOTES
[facility]    Hospitalist Progress Note  Name: Benito Marx    MRN: [id number]  Provider:  Tyler Espinosa DO MPH  Date of Service: 07/23/2022    Summary of Stay: Benito Marx is a 57 year old male with a history of cerebral palsy, partial quadriplegia, nonverbal state, dysphagia with recurrent aspiration episodes admitted on 7/22/2022 with an aspiration event leading to an isolated fever and brief hypoxia.    Problem List:   1. Aspiration event: He was recently hospitalized for similar issues.  He has known dysphagia with recurrent aspiration events.  Currently he is afebrile.  No evidence of pneumonia on chest x-ray.  He has already been weaned to room air and only had transient and mild hypoxia.  We will continue Augmentin that he was recently given on discharge yesterday.  Stop IV fluids after SLP consultation and reinitiate diet.  Unfortunately due to his cerebral palsy and dysphagia I suspect these aspiration events will continue to happen.  Due to his agitation and pulling at lines/tubes I do not think a tube feed is in his best interest and will likely not prevent aspiration.  Palliative care consultation requested but they are not available for 2 days and I think he is already scheduled to discharge by tomorrow so this could likely be pursued in the outpatient setting.  I have left a brief voicemail for his guardian Suzanne to discuss a care plan.  2. Acute hypoxic respiratory failure: Already weaned to room air and off oxygen.  3. Cerebral palsy, partial quadriplegia: Resume baseline medications.  Resides at [facility] home.    DVT Prophylaxis: Pneumatic Compression Devices  Code Status: Full Code  Diet: NPO for Medical/Clinical Reasons Except for: Meds    Luke Catheter: Not present  Disposition: Expected discharge in 1 days to . Goals prior to discharge include monitor for hypoxia.   Incidental Findings: None.  Family updated today: Called guardian, no answer, brief and confidential VM  left.     Interval History   Assumed care from previous hospitalist. The history was fully reviewed.  Non verbal, events reviewed, patient known to me from recent hospitalization.    -Data reviewed today: I personally reviewed all new labs and imaging results over the last 24 hours.     Physical Exam   Temp: 97.9  F (36.6  C) Temp src: Axillary BP: 106/65 Pulse: 66   Resp: 18 SpO2: 93 % O2 Device: None (Room air) Oxygen Delivery: 2 LPM  There were no vitals filed for this visit.  Vital Signs with Ranges  Temp:  [97.9  F (36.6  C)-101.6  F (38.7  C)] 97.9  F (36.6  C)  Pulse:  [] 66  Resp:  [11-34] 18  BP: ()/(47-94) 106/65  SpO2:  [89 %-100 %] 93 %  No intake/output data recorded.    GENERAL: No apparent distress. Awake, alert, nonverbal.  HEENT: Normocephalic, atraumatic. Extraocular movements intact.  CARDIOVASCULAR: Regular rate and rhythm without murmurs or rubs. No S3.  PULMONARY: Clear bilaterally.  GASTROINTESTINAL: Soft, non-tender, non-distended. Bowel sounds normoactive.   EXTREMITIES: No cyanosis or clubbing. No edema.  NEUROLOGICAL: CN 2-12 grossly intact, baseline neurological deficits.  DERMATOLOGICAL: No rash, ulcer, bruising, nor jaundice.     Medications     sodium chloride 75 mL/hr at 07/23/22 0200       albuterol  2.5 mg Nebulization BID     amoxicillin-clavulanate  1 tablet Oral BID     cetirizine  10 mg Oral Daily     citalopram  40 mg Oral Daily     fluticasone  2 spray Both Nostrils Daily     montelukast  10 mg Oral At Bedtime     OLANZapine  2.5 mg Oral At Bedtime     olopatadine  1 drop Left Eye BID     pantoprazole  20 mg Oral BID     Data     Laboratory:  Recent Labs   Lab 07/23/22  0841 07/22/22 2053 07/21/22  0637   WBC 6.6 10.6 3.3*   HGB 9.5* 10.8* 9.6*   HCT 32.2* 36.2* 32.8*   MCV 87 86 89    230 186     Recent Labs   Lab 07/23/22  0841 07/22/22 2053 07/22/22  0723 07/21/22  0637 07/20/22  0823    139  --   --  140   POTASSIUM 3.7 3.9 3.9   < > 3.5    CHLORIDE 110* 108  --   --  110*   CO2 28 28  --   --  25   ANIONGAP 3 3  --   --  5   GLC 98 90  --   --  116*   BUN 16 17  --   --  16   CR 0.62* 0.64*  --   --  0.48*   GFRESTIMATED >90 >90  --   --  >90   ASTRID 7.9* 8.9  --   --  8.3*    < > = values in this interval not displayed.     No results for input(s): CULT in the last 168 hours.    Imaging:  Recent Results (from the past 24 hour(s))   XR Chest Port 1 View    Narrative    EXAM: XR CHEST PORT 1 VIEW  LOCATION: [facility]  DATE/TIME: 7/22/2022 9:10 PM    INDICATION: Hypoxia.  COMPARISON: 7/16/2022.      Impression    IMPRESSION: The patient is rotated to the left on this exam. Heart size appears stable. Pulmonary vascularity is within normal limits. No pneumothorax. Thoracic curve. Surgical clips TERESA Espinosa DO MPH  [facility] Hospitalist  [address] [place] [place] [address]  07/23/2022

## 2022-07-23 NOTE — ED NOTES
[facility]  ED Nurse Handoff Report    Benito Marx is a 57 year old male   ED Chief complaint: Shortness of Breath  . ED Diagnosis:   Final diagnoses:   Aspiration pneumonitis (H)   Hypoxia     Allergies:   Allergies   Allergen Reactions     Nutritional Supplements Nausea and Vomiting     increased vomiting when taking this.     Seasonal Allergies        Code Status: Full Code  Activity level - Baseline/Home:  Total Care. Activity Level - Current:   Total Care. Lift room needed: No. Bariatric: No   Needed: No   Isolation: No. Infection: Not Applicable.     Vital Signs:   Vitals:    07/22/22 2100 07/22/22 2105 07/22/22 2115 07/22/22 2130   BP:    99/69   Pulse: 105 109 103 100   Resp: 19 22 21 14   Temp:       TempSrc:       SpO2: (!) 89% 97% 98% 100%       Cardiac Rhythm:  ,      Pain level:    Patient confused: At baseline. Patient Falls Risk: No.   Elimination Status: Has voided   Patient Report - Initial Complaint: 57 year old male anticoagulating on Eliquis with history of cerebral palsy, acute respiratory failure with hypoxia, Intractable vomiting with concern for aspiration, and recurrent aspiration pneumonia who presents with shortness of breath. Per chart review he was hospitalized here from July 10th until today for aspiration pneumonia and acute respiratory failure and started on a 5 day course of Augmentin upon discharge. His doctor recommenced a feeding tube which the caregiver declined. The patient was eating tonight and coughed and vomited. . Focused Assessment: LS diminished on 2L NC, febrile, slightly tachycardic   Tests Performed:   Labs Ordered and Resulted from Time of ED Arrival to Time of ED Departure   CBC WITH PLATELETS - Abnormal       Result Value    WBC Count 10.6      RBC Count 4.22 (*)     Hemoglobin 10.8 (*)     Hematocrit 36.2 (*)     MCV 86      MCH 25.6 (*)     MCHC 29.8 (*)     RDW 15.5 (*)     Platelet Count 230     BASIC METABOLIC PANEL - Abnormal     Sodium 139      Potassium 3.9      Chloride 108      Carbon Dioxide (CO2) 28      Anion Gap 3      Urea Nitrogen 17      Creatinine 0.64 (*)     Calcium 8.9      Glucose 90      GFR Estimate >90     BLOOD GAS VENOUS WITH OXYHEMOGLOBIN - Abnormal    pH Venous 7.43      pCO2 Venous 45      pO2 Venous 37      Bicarbonate Venous 29 (*)     FIO2 4      Oxyhemoglobin Venous 64 (*)     Base Excess/Deficit (+/-) 4.3 (*)    LACTIC ACID WHOLE BLOOD - Normal    Lactic Acid 1.5     INFLUENZA A/B & SARS-COV2 PCR MULTIPLEX   BLOOD CULTURE   BLOOD CULTURE     . Abnormal Results:   XR Chest Port 1 View   Preliminary Result   IMPRESSION: The patient is rotated to the left on this exam. Heart size appears stable. Pulmonary vascularity is within normal limits. No pneumothorax. Thoracic curve. Surgical clips RUQ.        .   Treatments provided: see mar, notes and flowsheets  Family Comments: n/a  OBS brochure/video discussed/provided to patient:  N/A  ED Medications:   Medications   piperacillin-tazobactam (ZOSYN) intermittent infusion 4.5 g (4.5 g Intravenous New Bag 7/22/22 2124)   acetaminophen (TYLENOL) Suppository 650 mg (650 mg Rectal Given 7/22/22 2057)     Drips infusing:  No  For the majority of the shift, the patient's behavior Green. Interventions performed were n/a.    Sepsis treatment initiated: No     Patient tested for COVID 19 prior to admission: YES - negative    ED Nurse Name/Phone Number: Shabnam Del Rio RN,   9:43 PM    RECEIVING UNIT ED HANDOFF REVIEW    Above ED Nurse Handoff Report was reviewed: Yes  Reviewed by: Shruthi Russ RN on July 23, 2022 at 5:32 PM

## 2022-07-23 NOTE — ED PROVIDER NOTES
History   Chief Complaint:  Shortness of Breath     The history is limited by a developmental delay.      Benito Marx is a 57 year old male with history of cerebral palsy, acute respiratory failure with hypoxia, frequent nausea / vomiting, and recurrent aspiration pneumonia who presents with shortness of breath. Per chart review he was hospitalized here from July 10th until discharge earlier today for aspiration pneumonia and acute respiratory failure and started on a 5 day course of Augmentin upon discharge. Per report, feeding was recommended though caregiver declined. The patient was eating tonight and coughed and vomited. EMS was contacted.  He was SpO2 85% on room air and was given duoneb by group home staff. EMS placed patient on 4L NC. His blood glucose was 98. Pt is nonverbal at baseline    Review of Systems   Unable to perform ROS: Patient nonverbal     Allergies:  Nutritional Supplements  Seasonal Allergies    Medications:  Albuterol   Amoxicillin-clavulanate  Cetrizine   Cholecalciferol  Citalopram   Diclofenac   Fluticasone   Lorazepam   Magnesium hydroxide  Montelukast   Olanzapine   Ondansetron   Pantoprazole   Senna   Tizanidine   Eliquis  Kristalose   Nystatin     Past Medical History:     Adjustment disorder with depressed mood   Quadriplegic cerebral palsy   Congential hemiplegia   Episodic mood disorder   Hiatal hernia   Oropharyngeal dysphagia   Seizure   Moderate intellectual disability   Acute respiratory failure with hypoxia  Aspiration pneumonia   Fecal impartation   Intractable vomiting with concern for aspiration  Recurrent aspiration pneumonia  Prepatellar bursitis of right knee    Past Surgical History:    cholecystectomy    Appendectomy   Resection of right back cyst      Social History:  Living Situation: group home.   The patient presents to the ED by means of ambulance.       Physical Exam     Patient Vitals for the past 24 hrs:   BP Temp Temp src Pulse Resp SpO2   07/22/22 2130  99/69 -- -- 100 14 100 %   07/22/22 2115 -- -- -- 103 21 98 %   07/22/22 2105 -- -- -- 109 22 97 %   07/22/22 2100 -- -- -- 105 19 (!) 89 %   07/22/22 2051 -- (!) 101.6  F (38.7  C) Temporal -- -- --   07/22/22 2048 114/70 -- -- 109 20 97 %       Physical Exam  General:   Thin appearing middle age male  Eyes:   Conjunctiva without injection or scleral icterus  ENT:   Moist mucous membranes   Nares patent   Pinnae normal  Neck:   Full ROM   No stiffness appreciated  Resp:   Lungs CTAB   No crackles, wheezing or audible rubs   Good air movement  CV:    Normal rate, regular rhythm   S1 and S2 present   No murmur, gallop or rub  GI:   BS present   Abdomen soft without distention   Non-tender to light and deep palpation   No guarding or rebound tenderness  Skin:   Warm, dry, well perfused   No rashes or open wounds on exposed skin  MSK:   Moves all extremities   No focal deformities or swelling  Neuro:   Alert  Psych:   Unable to assess        Emergency Department Course     Imaging:  XR Chest Port 1 View   Preliminary Result   IMPRESSION: The patient is rotated to the left on this exam. Heart size appears stable. Pulmonary vascularity is within normal limits. No pneumothorax. Thoracic curve. Surgical clips RUQ.        Report per radiology    Laboratory:  Labs Ordered and Resulted from Time of ED Arrival to Time of ED Departure   CBC WITH PLATELETS - Abnormal       Result Value    WBC Count 10.6      RBC Count 4.22 (*)     Hemoglobin 10.8 (*)     Hematocrit 36.2 (*)     MCV 86      MCH 25.6 (*)     MCHC 29.8 (*)     RDW 15.5 (*)     Platelet Count 230     BASIC METABOLIC PANEL - Abnormal    Sodium 139      Potassium 3.9      Chloride 108      Carbon Dioxide (CO2) 28      Anion Gap 3      Urea Nitrogen 17      Creatinine 0.64 (*)     Calcium 8.9      Glucose 90      GFR Estimate >90     BLOOD GAS VENOUS WITH OXYHEMOGLOBIN - Abnormal    pH Venous 7.43      pCO2 Venous 45      pO2 Venous 37      Bicarbonate Venous 29 (*)      FIO2 4      Oxyhemoglobin Venous 64 (*)     Base Excess/Deficit (+/-) 4.3 (*)    LACTIC ACID WHOLE BLOOD - Normal    Lactic Acid 1.5     INFLUENZA A/B & SARS-COV2 PCR MULTIPLEX   BLOOD CULTURE   BLOOD CULTURE      Emergency Department Course:    Reviewed:  I reviewed nursing notes, vitals, past medical history and Care Everywhere    Assessments:  2055 I obtained history and examined the patient as noted above.     Consults:  2139 I consulted the hospitalist Dr. Scott regarding admission.     Interventions:  Medications   piperacillin-tazobactam (ZOSYN) intermittent infusion 4.5 g (4.5 g Intravenous New Bag 7/22/22 2124)   acetaminophen (TYLENOL) Suppository 650 mg (650 mg Rectal Given 7/22/22 2057)       Disposition:  The patient was admitted to the hospital under the care of Dr. Scott.     Impression & Plan       Medical Decision Making:  Benito Marx is a 57-year-old male with a complex PMH including cerebral palsy, frequent aspiration pneumonia, presenting to the ED for evaluation of shortness of breath via EMS.  VS on presentation demonstrate T of 101.6, tachycardia, and SPO2 of 88-89% on room air.  History obtained from chart review, and per EMS.  Presenting concern for aspiration pneumonitis given the history of vomiting, coughing, shortness of breath, and hypoxia.  Chest x-ray today without evidence of obvious infiltrate, though early in course, infiltrate may not be evident.  In light of frequent aspiration pneumonia, recent hospitalization, patient was provided 1 dose of Zosyn.  He was maintained on supplemental O2 via nasal cannula.  He was provided antipyretics.  Other etiologies for fever were considered though felt to be less likely.  No skin or soft tissue findings on thorough exam.  No evidence of perineal skin breakdown.  No evidence of localized joint swelling, or impairments in range of motion.  Abdominal exam is soft and nontender to light and deep palpation throughout, arguing against  acute surgical intra-abdominal pathology.  Labs reveal normal WBC count and normal lactate.  Blood cultures x2 obtained and are presently pending.  In light of hypoxia, will plan for hospital admission and close respiratory monitoring.  Case discussed with Dr. Scott, who has accepted patient for admission.    Diagnosis:    ICD-10-CM    1. Aspiration pneumonitis (H)  J69.0    2. Hypoxia  R09.02        Scribe Disclosure:  I, Joshua Kjer, am serving as a scribe at 8:49 PM on 7/22/2022 to document services personally performed by Otf Putnam MD based on my observations and the provider's statements to me.            Otf Putnam MD  07/22/22 6721

## 2022-07-23 NOTE — PROGRESS NOTES
"CLINICAL SWALLOW EVALUATION       07/23/22 1000   General Information   Onset of Illness/Injury or Date of Surgery 07/22/22   Referring Physician Dr. Brooks   Patient/Family Therapy Goal Statement (SLP) Indicates he would like to eat, also indicates yes to feeling nauseous   Pertinent History of Current Problem Patient is a 56 year old male with a history of  Cerebral palsy with partial quadriplegia (can stand to transfer but typically in a wheelchair), dysphagia who eats with aspiration precautions an hx of aspiration pna,  episodic n/v admitted on 7/5/2022 with intractable n/v.  He underwent SLP intervention during that stay and progressed back to his puree diet with extremely thick liquids following a video swallow study.  He was discharged on 7/22 and returned that same evening following a meal at home where he was reported to \"aspirate at dinner.\"  Guardians could not be reached for additional information about that event.  Patient had vomited more overnight and remained NPO until evaluation.   Past History of Dysphagia Severe dysphagia hx   Type of Evaluation   Type of Evaluation Swallow Evaluation   Oral Motor   Oral Musculature anomalies present   Structural Abnormalities none present   Dentition (Oral Motor)   Dentition (Oral Motor) natural dentition;poor condition;other (see comments)  (Evidence of grinding)   Facial Symmetry (Oral Motor)   Facial Symmetry (Oral Motor) WNL   Cough/Swallow/Gag Reflex (Oral Motor)   Volitional Throat Clear/Cough (Oral Motor) unable/difficult to assess;unable to produce   Comment, Cough/Swallow/Gag Reflex (Oral Motor) Unable to produce volitional cough and throat clear   Vocal Quality/Secretion Management (Oral Motor)   Vocal Quality (Oral Motor) other (see comments)  (Limited volitional voicing capability at baseline)   Secretion Management (Oral Motor) difficulty swallowing secretions;drooling, bilateral;excessive secretions;other (see comments)  (Pre-vomiting secretion " overload with drooling)   General Swallowing Observations   Current Diet/Method of Nutritional Intake (General Swallowing Observations, NIS) NPO   Respiratory Support (General Swallowing Observations) none   Swallowing Evaluation Clinical swallow evaluation   Clinical Swallow Evaluation   Feeding Assistance dependent   Additional evaluation(s) completed today No   Clinical Swallow Evaluation Textures Trialed pureed  (Per RN report only, no PO trials presented with vomiting)   Clinical Swallow Evaluation: Puree Solid Texture Trial   Diagnostic Statement RN reported crushed meds with puree was tolerated without overt Sx of aspiration   Swallowing Recommendations   Diet Consistency Recommendations pureed (level 4);extremely thick liquids (level 4)   Supervision Level for Intake 1:1 supervision needed   Mode of Delivery Recommendations bolus size, small;food moistened;liquids via spoon only;slow rate of intake  (1/2 tsp sized for liquids)   Monitoring/Assistance Required (Eating/Swallowing) monitor for cough or change in vocal quality with intake;stop eating activities when fatigue is present   Recommended Feeding/Eating Techniques (Swallow Eval) maintain upright sitting position for eating;maintain upright posture during/after eating for 30 minutes;provide assist with feeding   Medication Administration Recommendations, Swallowing (SLP) Crushed with puree   General Therapy Interventions   Planned Therapy Interventions Dysphagia Treatment   Clinical Impression   Criteria for Skilled Therapeutic Interventions Met (SLP Eval) Yes, treatment indicated   SLP Diagnosis Moderately-severe oropharyngeal dysphagia   Risks & Benefits of therapy have been explained evaluation/treatment results reviewed;care plan/treatment goals reviewed;participants included;patient   Clinical Impression Comments Patient presents with increased aspiration risk during vomiting events today, demonstrating inability for airway protection and adequate  safe positioning independently during vomiting events.   SLP Discharge Planning   SLP Discharge Recommendation home with home care speech therapy   SLP Rationale for DC Rec Below baseline, recommend SLP services for return to least restrictive intake even for quality of life improvement   SLP Brief overview of current status  Clinical swallow evaluation completed following readmission - patient well known to SLP services and with previous video swallow study with return to his baseline diet.  He is verbalizing nausea and vomited with need for 1:1 assistance to avoid aspiration during vomiting.  Recommend return to extremely thick clear liquids by spoon when ready to return to PO after nausea and vomiting resolve.    Total Evaluation Time   Total Evaluation Time (Minutes) 30   SLP Goals   Therapy Frequency (SLP Eval) 5 times/wk   SLP Predicted Duration/Target Date for Goal Attainment 07/30/22   SLP Goals Swallow   SLP: Safely tolerate diet without signs/symptoms of aspiration Pureed diet;With assistance/supervision;Extremely thick liquids;With use of swallow precautions

## 2022-07-23 NOTE — PHARMACY-ADMISSION MEDICATION HISTORY
Admission medication history interview status for this patient is complete. See [facility] admission navigator for allergy information, prior to admission medications and immunization status.     Patient discharged this afternoon.        Prior to Admission medications    Medication Sig Last Dose Taking? Auth Provider Long Term End Date   albuterol (PROVENTIL) (2.5 MG/3ML) 0.083% neb solution Take 2.5 mg by nebulization 2 times daily  7/22/2022 at Unknown time Yes Reported, Patient Yes    amoxicillin-clavulanate (AUGMENTIN) 875-125 MG tablet Take 1 tablet by mouth 2 times daily  Yes Tyler Espinosa DO     bisacodyl (DULCOLAX) 10 MG suppository Place 1 suppository (10 mg) rectally daily as needed for constipation 7/18/2022 Yes Deloris Gomez MD     carbamide peroxide (DEBROX) 6.5 % otic solution Place 5-10 drops into both ears every 30 days  Yes Unknown, Entered By History     cetirizine (ZYRTEC) 10 MG tablet Take 10 mg by mouth daily 7/22/2022 at Unknown time Yes Reported, Patient     cholecalciferol 25 MCG (1000 UT) TABS Take 1,000 Units by mouth daily  Yes Reported, Patient     citalopram (CELEXA) 40 MG tablet Take 40 mg by mouth daily  Yes Reported, Patient Yes    diclofenac (VOLTAREN) 1 % topical gel Apply 1 g topically 2 times daily . To each knee 7/22/2022 at Unknown time Yes Unknown, Entered By History     fluticasone (FLONASE) 50 MCG/ACT nasal spray Spray 2 sprays into both nostrils daily 7/22/2022 at Unknown time Yes Reported, Patient     LORazepam (ATIVAN) 0.5 MG tablet Take 0.5 mg by mouth 2 times daily as needed for agitation or anxiety  Yes Unknown, Entered By History     magnesium hydroxide (MILK OF MAGNESIA) 400 MG/5ML suspension Take 30 mLs by mouth daily 7/20/2022 Yes Reported, Patient     montelukast (SINGULAIR) 10 MG tablet Take 10 mg by mouth At Bedtime 7/21/2022 at Unknown time Yes Unknown, Entered By History Yes    OLANZapine (ZYPREXA) 2.5 MG tablet Take 2.5 mg by mouth At Bedtime 7/21/2022 at  Unknown time Yes Unknown, Entered By History Yes    olopatadine (PATANOL) 0.1 % ophthalmic solution Place 1 drop Into the left eye 2 times daily 7/22/2022 at Unknown time Yes Unknown, Entered By History     ondansetron (ZOFRAN ODT) 4 MG ODT tab Take 1 tablet (4 mg) by mouth every 4 hours as needed for nausea or vomiting  Yes Deloris Gomez MD     pantoprazole (PROTONIX) 20 MG EC tablet Take 20 mg by mouth 2 times daily  Yes Reported, Patient     polyethylene glycol (MIRALAX) 17 GM/Dose powder Take 17 g by mouth 2 times daily Can increase up to 3times a day if no BM with BID dosing 7/19/2022 Yes Deloris Gomez MD     senna (SENOKOT) 8.6 MG tablet Take 2 tablets by mouth 2 times daily  7/22/2022 at Unknown time Yes Reported, Patient     sodium fluoride dental gel (PREVIDENT) 1.1 % GEL topical gel Apply to affected area At Bedtime  Yes Unknown, Entered By History     tiZANidine (ZANAFLEX) 4 MG tablet Take 4 mg by mouth 3 times daily 7/22/2022 at Unknown time Yes Reported, Patient

## 2022-07-23 NOTE — ED TRIAGE NOTES
Pt BIBA for possible aspiration. Pt was recently discharged from hospital where doc recommended feeding tube, caregiver declined. Pt was eating tonight, coughed and vomited. SpO2 85% on room air, was given duoneb by  staff. EMS placed pt on 4L NC. BG 98. Pt is nonverbal at baseline.

## 2022-07-23 NOTE — H&P
[facility]    History and Physical - Hospitalist Service       Date of Admission:  7/22/2022    Assessment & Plan      Benito Marx is a 57 year old male admitted on 7/22/2022. He has a past medical history of cerebral palsy, partial quadriplegia, at baseline cannot stand to transfer but typically wheelchair-bound, dysphagia, on aspiration precautions with prior history of aspiration pneumonia, prior episodic bouts of nausea and vomiting.  He was hospitalized from 7/10 until earlier today, 7/22 for an episode of aspiration pneumonia.  After returning to his group home, he was eating dinner.  Appeared to aspirate on food.  Sent back to emergency room for further evaluation.  He was noted to have a fever at 1 point in the emergency room.  Also had transient hypoxia.    1.  Aspiration event.  With recent aspiration pneumonia.  No evidence of recurrent pneumonia on chest x-ray today.  Did have brief fever and transient hypoxia.  Currently, with oxygen saturation of 94% on room air.  Did have 1 dose of IV Zosyn in the emergency room.  -Restart Augmentin 875/125 mg twice a day.  -N.p.o. for now.  -Gentle IV fluids overnight.  -Speech pathology consult.  -Palliative care consult to discuss goals as he is likely to continue to have aspiration events going forward.    2.  Acute hypoxic respiratory failure.  Transient hypoxia in the ER.  Did require supplemental oxygen for a brief amount of time after arrival.  Hypoxia is currently resolved.  -Continue Augmentin as noted above.  -Supplemental oxygen if she becomes hypoxic.    3.  Cerebral palsy, partial quadriplegia, known dysphagia.  -Did discuss case with patient's guardian, Suzanne, by phone the night of 7/22.  -Suzanne does relay that patient needs a bedside sitter when in the hospital if he requires an IV.  -Bedside sitter ordered.  -Palliative care consult.         Diet:  N.p.o.  DVT Prophylaxis: Pneumatic Compression Devices  Luke Catheter: Not  present  Central Lines: None  Cardiac Monitoring: None  Code Status:  Full code.    Clinically Significant Risk Factors Present on Admission                            Cisco Scott DO  Hospitalist Service  [facility]  Securely message with the Vocera Web Console (learn more here)  Text page via [employer] ([email or web address]) Paging/Directory         ______________________________________________________________________    Chief Complaint   Aspiration    History is obtained from the patient and emergency department physician    History of Present Illness   Benito Marx is a 57 year old male who has a past medical history of cerebral palsy, partial quadriplegia, at baseline cannot stand to transfer but typically wheelchair-bound, dysphagia, on aspiration precautions with prior history of aspiration pneumonia, prior episodic bouts of nausea and vomiting.  He was hospitalized from 7/10 until 7/22 for aspiration pneumonia.  Left the hospital earlier today back to his group home.  After arriving at group home, was having a meal.  Appeared to aspirate while eating.  Was sent back to emergency room for evaluation after the aspiration event.  In the emergency room, noted to have a fever at 1 time.  Also noted to have very mild hypoxia for a short amount of time.  No staff or guardian with him in the ER.  I did discuss case with patient's guardian, Suzanne, by phone.    Review of Systems    Review of systems not obtained due to patient factors - mental status    Past Medical History    I have reviewed this patient's medical history and updated it with pertinent information if needed.   Past Medical History:   Diagnosis Date     Adjustment disorder with depressed mood      Cerebral palsy (H)      Constipated      GERD (gastroesophageal reflux disease)      History of aspiration pneumonia      Mental retardation, moderate (I.Q. 35-49)     prenatal hypoxia     Seizures (H)     LAST Sz 1994, No AEM as of 10.15.18        Past Surgical History   I have reviewed this patient's surgical history and updated it with pertinent information if needed.  Past Surgical History:   Procedure Laterality Date     CHOLECYSTECTOMY       ESOPHAGOSCOPY, GASTROSCOPY, DUODENOSCOPY (EGD), COMBINED N/A 7/12/2022    Procedure: Esophagogastroduodenoscopy;  Surgeon: Zi Faith MD;  Location:  OR       Social History   I have reviewed this patient's social history and updated it with pertinent information if needed.  Social History     Tobacco Use     Smoking status: Never Smoker     Smokeless tobacco: Never Used   Substance Use Topics     Alcohol use: Never     Drug use: Never       Family History   I have reviewed this patient's family history and updated it with pertinent information if needed.  Family History   Problem Relation Age of Onset     No Known Problems Mother      No Known Problems Father      No Known Problems Maternal Grandmother      No Known Problems Maternal Grandfather      No Known Problems Paternal Grandmother      No Known Problems Paternal Grandfather      No Known Problems Brother        Prior to Admission Medications   Prior to Admission Medications   Prescriptions Last Dose Informant Patient Reported? Taking?   LORazepam (ATIVAN) 0.5 MG tablet   Yes Yes   Sig: Take 0.5 mg by mouth 2 times daily as needed for agitation or anxiety   OLANZapine (ZYPREXA) 2.5 MG tablet 7/21/2022 at Unknown time  Yes Yes   Sig: Take 2.5 mg by mouth At Bedtime   albuterol (PROVENTIL) (2.5 MG/3ML) 0.083% neb solution 7/22/2022 at Unknown time  Yes Yes   Sig: Take 2.5 mg by nebulization 2 times daily    amoxicillin-clavulanate (AUGMENTIN) 875-125 MG tablet   No Yes   Sig: Take 1 tablet by mouth 2 times daily   bisacodyl (DULCOLAX) 10 MG suppository 7/18/2022  No Yes   Sig: Place 1 suppository (10 mg) rectally daily as needed for constipation   carbamide peroxide (DEBROX) 6.5 % otic solution   Yes Yes   Sig: Place 5-10 drops into both ears every  30 days   cetirizine (ZYRTEC) 10 MG tablet 7/22/2022 at Unknown time  Yes Yes   Sig: Take 10 mg by mouth daily   cholecalciferol 25 MCG (1000 UT) TABS   Yes Yes   Sig: Take 1,000 Units by mouth daily   citalopram (CELEXA) 40 MG tablet   Yes Yes   Sig: Take 40 mg by mouth daily   diclofenac (VOLTAREN) 1 % topical gel 7/22/2022 at Unknown time  Yes Yes   Sig: Apply 1 g topically 2 times daily . To each knee   fluticasone (FLONASE) 50 MCG/ACT nasal spray 7/22/2022 at Unknown time  Yes Yes   Sig: Spray 2 sprays into both nostrils daily   magnesium hydroxide (MILK OF MAGNESIA) 400 MG/5ML suspension 7/20/2022  Yes Yes   Sig: Take 30 mLs by mouth daily   montelukast (SINGULAIR) 10 MG tablet 7/21/2022 at Unknown time  Yes Yes   Sig: Take 10 mg by mouth At Bedtime   olopatadine (PATANOL) 0.1 % ophthalmic solution 7/22/2022 at Unknown time  Yes Yes   Sig: Place 1 drop Into the left eye 2 times daily   ondansetron (ZOFRAN ODT) 4 MG ODT tab   No Yes   Sig: Take 1 tablet (4 mg) by mouth every 4 hours as needed for nausea or vomiting   pantoprazole (PROTONIX) 20 MG EC tablet   Yes Yes   Sig: Take 20 mg by mouth 2 times daily   polyethylene glycol (MIRALAX) 17 GM/Dose powder 7/19/2022  No Yes   Sig: Take 17 g by mouth 2 times daily Can increase up to 3times a day if no BM with BID dosing   senna (SENOKOT) 8.6 MG tablet 7/22/2022 at Unknown time  Yes Yes   Sig: Take 2 tablets by mouth 2 times daily    sodium fluoride dental gel (PREVIDENT) 1.1 % GEL topical gel   Yes Yes   Sig: Apply to affected area At Bedtime   tiZANidine (ZANAFLEX) 4 MG tablet 7/22/2022 at Unknown time  Yes Yes   Sig: Take 4 mg by mouth 3 times daily      Facility-Administered Medications: None     Allergies   Allergies   Allergen Reactions     Nutritional Supplements Nausea and Vomiting     increased vomiting when taking this.     Seasonal Allergies        Physical Exam   Vital Signs: Temp: (!) 101.6  F (38.7  C) Temp src: Temporal BP: 97/61 Pulse: 85   Resp:  16 SpO2: 94 % O2 Device: None (Room air) Oxygen Delivery: 2 LPM  Weight: 0 lbs 0 oz    Gen:  NAD, awake, nonverbal.  Eyes:  PERRL, sclera anicteric.  OP:  MMM, no lesions.  CV:  Regular, no loud murmurs.  Lung:  CTA b/l, normal effort.  Ab:  +BS, soft.  Skin:  Warm, dry to touch.  No rash.  Ext:  No pitting edema LE b/l.      Data   Data reviewed today: I reviewed all medications, new labs and imaging results over the last 24 hours.     Recent Labs   Lab 07/22/22 2053 07/22/22 0723 07/21/22  0637 07/20/22  0823 07/19/22  1807 07/19/22  1034   WBC 10.6  --  3.3* 3.9*  --  3.4*   HGB 10.8*  --  9.6* 10.0*  --  9.4*   MCV 86  --  89 86  --  87     --  186 165  --  172     --   --  140  --  143   POTASSIUM 3.9 3.9 3.7 3.5   < > 3.3*   CHLORIDE 108  --   --  110*  --  112*   CO2 28  --   --  25  --  24   BUN 17  --   --  16  --  22   CR 0.64*  --   --  0.48*  --  0.47*   ANIONGAP 3  --   --  5  --  7   ASTRID 8.9  --   --  8.3*  --  8.1*   GLC 90  --   --  116*  --  95    < > = values in this interval not displayed.

## 2022-07-24 NOTE — PLAN OF CARE
PRIMARY DIAGNOSIS: Aspiration Pneumonitis  OUTPATIENT/OBSERVATION GOALS TO BE MET BEFORE DISCHARGE:  1. ADLs back to baseline: No    2. Activity and level of assistance: Bedrest    3. Pain status: Unable to assess, pt non-verbal    4. Return to near baseline physical activity: No     Discharge Planner Nurse   Safe discharge environment identified: Yes  Barriers to discharge: Yes       Entered by: eKrri Rudolph RN 07/24/2022      Please review provider order for any additional goals.   Nurse to notify provider when observation goals have been met and patient is ready for discharge.      Pt is nonverbal, sitter at bedside throughout night. No episodes of vomiting overnight. Lungs coarse. On continuous pulse ox - bradycardic and fluctuating O2 overnight. Oxy mask placed this am at 3L. IV infusing NS 75mL/hr. External catheter changed. Working with palliative care, SW, and speech.

## 2022-07-24 NOTE — PROGRESS NOTES
[facility]    Medicine Progress Note - Hospitalist Service    Date of Admission:  7/22/2022    Assessment & Plan        Benito Marx is a 57 year old male with a history of cerebral palsy, partial quadriplegia, nonverbal state, dysphagia with recurrent aspiration episodes, previous recurrent nausea/vomiting, severe esophagitis on PPI who was admitted on 7/22/2022 with an aspiration event leading to an isolated fever and brief hypoxia.    Aspiration event: He was recently hospitalized for similar issues.  He has known dysphagia with recurrent aspiration events.  Currently he is afebrile.  No evidence of pneumonia on chest x-ray.    - currently weaned to room air  - continue Augmentin that he was recently given on discharge 7/21  - IVFs discontinued after diet reinitiated   - due to his cerebral palsy and dysphagia suspect these aspiration events will continue to happen  - with known agitation and pulling at lines/tubes, tube feeds would unlikely be in the patient's best interest     - seen by SLP, recommending pureed diet with extremely thick liquids   - Palliative care consultation requested, will plan to have them see in AM since he will likely still be admitted     Recurrent nausea/vomiting: reported to have three episodes of emesis overnight and nausea with emesis again on 7/24 after eating. Patient has known history of hiatal hernia. Per guardian this has been an ongoing issue for the patient and previously been behavioral in origin.   - suspect multifactorial related to known hiatal hernia, esophagitis, and behavioral  - diet as tolerated  - PRN antiemetics   - trial addition of carafate with meals in case esophagitis is contributing     Acute hypoxic respiratory failure: noted to be put on 3 L supplemental oxygen overnight, weaned to RA again on 7/24    Severe esophagitis: noted on EGD performed on 7/12 during previous hospital stay  - continue PTA Protonix 40 mg BID  - trial Carafate  with meals     Cerebral palsy, partial quadriplegia: Resume baseline medications.  Resides at group home.     Diet: Combination Diet Pureed Diet (level 4); Extremely Thick (level 4) (by spoon only)    DVT Prophylaxis: Pneumatic Compression Devices  Luke Catheter: Not present  Central Lines: None  Cardiac Monitoring: None  Code Status: Full Code      Disposition Plan         The patient's care was discussed with the Bedside Nurse, Patient and legal guardian.    Zehra Arellano PA-C  Hospitalist Service  [employer]    Clinically Significant Risk Factors Present on Admission          # Hypocalcemia: Ca = 7.9 mg/dL (Ref range: 8.5 - 10.1 mg/dL) and/or iCa = N/A on admission, will replace as needed               ______________________________________________________________________    Interval History   Assumed care, chart reviewed. Patient is nonverbal, appears comfortable while eating breakfast.     Notified by nurse midday that patient was still having episodes of nausea, vomiting, and dry heaving.    Called and updated the patient's guardian Suzanne with plans for patient to stay this evening, start Carafate to see if this helps with esophagitis, and have palliative care see patient prior to discharge to discuss goals of cares and prevention strategies for recurrent admissions.    Data reviewed today: I reviewed all medications, new labs and imaging results over the last 24 hours.    Physical Exam   Vital Signs: Temp: 97.7  F (36.5  C) Temp src: Axillary BP: 99/61 Pulse: 59   Resp: 16 SpO2: 98 % O2 Device: None (Room air) Oxygen Delivery: 3 LPM  Weight: 0 lbs 0 oz  GENERAL: No apparent distress. Awake, alert, nonverbal.  HEENT: Normocephalic, atraumatic. Extraocular movements intact.  CARDIOVASCULAR: Regular rate and rhythm without murmurs or rubs. No S3.  PULMONARY: CTAB  GASTROINTESTINAL: Soft, non-tender, non-distended. Bowel sounds normoactive.   EXTREMITIES: No cyanosis or clubbing. No  edema.  NEUROLOGICAL: CN 2-12 grossly intact, baseline neurological deficits.  DERMATOLOGICAL: No rash, ulcer, bruising, nor jaundice.     Data    Results for orders placed or performed during the hospital encounter of 07/22/22   XR Chest Port 1 View     Status: None    Narrative    EXAM: XR CHEST PORT 1 VIEW  LOCATION: [facility]  DATE/TIME: 7/22/2022 9:10 PM    INDICATION: Hypoxia.  COMPARISON: 7/16/2022.      Impression    IMPRESSION: The patient is rotated to the left on this exam. Heart size appears stable. Pulmonary vascularity is within normal limits. No pneumothorax. Thoracic curve. Surgical clips RUQ.   CBC (platelets, no diff)     Status: Abnormal   Result Value Ref Range    WBC Count 10.6 4.0 - 11.0 10e3/uL    RBC Count 4.22 (L) 4.40 - 5.90 10e6/uL    Hemoglobin 10.8 (L) 13.3 - 17.7 g/dL    Hematocrit 36.2 (L) 40.0 - 53.0 %    MCV 86 78 - 100 fL    MCH 25.6 (L) 26.5 - 33.0 pg    MCHC 29.8 (L) 31.5 - 36.5 g/dL    RDW 15.5 (H) 10.0 - 15.0 %    Platelet Count 230 150 - 450 10e3/uL   Basic metabolic panel (BMP)     Status: Abnormal   Result Value Ref Range    Sodium 139 133 - 144 mmol/L    Potassium 3.9 3.4 - 5.3 mmol/L    Chloride 108 94 - 109 mmol/L    Carbon Dioxide (CO2) 28 20 - 32 mmol/L    Anion Gap 3 3 - 14 mmol/L    Urea Nitrogen 17 7 - 30 mg/dL    Creatinine 0.64 (L) 0.66 - 1.25 mg/dL    Calcium 8.9 8.5 - 10.1 mg/dL    Glucose 90 70 - 99 mg/dL    GFR Estimate >90 >60 mL/min/1.73m2   Lactic acid whole blood     Status: Normal   Result Value Ref Range    Lactic Acid 1.5 0.7 - 2.0 mmol/L   Blood gas venous and oxyhgb     Status: Abnormal   Result Value Ref Range    pH Venous 7.43 7.32 - 7.43    pCO2 Venous 45 40 - 50 mm Hg    pO2 Venous 37 25 - 47 mm Hg    Bicarbonate Venous 29 (H) 21 - 28 mmol/L    FIO2 4     Oxyhemoglobin Venous 64 (L) 70 - 75 %    Base Excess/Deficit (+/-) 4.3 (H) -7.7 - 1.9 mmol/L   Extra Tube ([facility] Draw)     Status: None    Narrative    The following  orders were created for panel order Extra Tube ([facility] Draw).  Procedure                               Abnormality         Status                     ---------                               -----------         ------                     Extra Blue Top Tube[[id number]]                              Final result               Extra Red Top Tube[[id number]]                               Final result                 Please view results for these tests on the individual orders.   Extra Blue Top Tube     Status: None   Result Value Ref Range    Hold Specimen JIC    Extra Red Top Tube     Status: None   Result Value Ref Range    Hold Specimen JIC    Symptomatic; Auto-generated order Influenza A/B & SARS-CoV2 (COVID-19) Virus PCR Multiplex Nasopharyngeal     Status: Normal    Specimen: Nasopharyngeal; Swab   Result Value Ref Range    Influenza A PCR Negative Negative    Influenza B PCR Negative Negative    RSV PCR Negative Negative    SARS CoV2 PCR Negative Negative    Narrative    Testing was performed using the Xpert Xpress CoV2/Flu/RSV Assay on the [employer] Instrument. This test should be ordered for the detection of SARS-CoV-2 and influenza viruses in individuals who meet clinical and/or epidemiological criteria. Test performance is unknown in asymptomatic patients. This test is for in vitro diagnostic use under the FDA EUA for laboratories certified under CLIA to perform high or moderate complexity testing. This test has not been FDA cleared or approved. A negative result does not rule out the presence of PCR inhibitors in the specimen or target RNA in concentration below the limit of detection for the assay. If only one viral target is positive but coinfection with multiple targets is suspected, the sample should be re-tested with another FDA cleared, approved, or authorized test, if coinfection would change clinical management. This test was validated by the [facility]. These laboratories  are certified under the Clinical  Laboratory Improvement Amendments of 1988 (CLIA-88) as qualified to perform high complexity laboratory testing.   Basic metabolic panel     Status: Abnormal   Result Value Ref Range    Sodium 141 133 - 144 mmol/L    Potassium 3.7 3.4 - 5.3 mmol/L    Chloride 110 (H) 94 - 109 mmol/L    Carbon Dioxide (CO2) 28 20 - 32 mmol/L    Anion Gap 3 3 - 14 mmol/L    Urea Nitrogen 16 7 - 30 mg/dL    Creatinine 0.62 (L) 0.66 - 1.25 mg/dL    Calcium 7.9 (L) 8.5 - 10.1 mg/dL    Glucose 98 70 - 99 mg/dL    GFR Estimate >90 >60 mL/min/1.73m2   CBC with platelets     Status: Abnormal   Result Value Ref Range    WBC Count 6.6 4.0 - 11.0 10e3/uL    RBC Count 3.69 (L) 4.40 - 5.90 10e6/uL    Hemoglobin 9.5 (L) 13.3 - 17.7 g/dL    Hematocrit 32.2 (L) 40.0 - 53.0 %    MCV 87 78 - 100 fL    MCH 25.7 (L) 26.5 - 33.0 pg    MCHC 29.5 (L) 31.5 - 36.5 g/dL    RDW 15.9 (H) 10.0 - 15.0 %    Platelet Count 185 150 - 450 10e3/uL   Blood Culture Arm, Left     Status: Normal (Preliminary result)    Specimen: Arm, Left; Blood   Result Value Ref Range    Culture No growth after 1 day    Blood Culture Peripheral Blood     Status: Normal (Preliminary result)    Specimen: Peripheral Blood   Result Value Ref Range    Culture No growth after 1 day

## 2022-07-24 NOTE — PLAN OF CARE
PRIMARY DIAGNOSIS: Aspiration Pneumonitis  OUTPATIENT/OBSERVATION GOALS TO BE MET BEFORE DISCHARGE:  1. ADLs back to baseline: No    2. Activity and level of assistance: Bedrest    3. Pain status: Unable to assess, pt non-verbal    4. Return to near baseline physical activity: No     Discharge Planner Nurse   Safe discharge environment identified: Yes  Barriers to discharge: Yes       Entered by: Gabriella Argueta RN 07/24/2022      Nonverbal at baseline, vitally stable on RA. Pt had x1 emesis and dry heaving off and on almost all shift, almost making himself gag/vomit. PRN zofran given. Continuing on augmentin for aspiration PNA. Continuous pulse ox on. Extra thick liquids, pureed diet. Assist x2 lift assist. Continue to monitor.       Please review provider order for any additional goals.   Nurse to notify provider when observation goals have been met and patient is ready for discharge.

## 2022-07-24 NOTE — PLAN OF CARE
PRIMARY DIAGNOSIS: Aspiration Pneumonitis  OUTPATIENT/OBSERVATION GOALS TO BE MET BEFORE DISCHARGE:  1. ADLs back to baseline: No    2. Activity and level of assistance: Bedrest    3. Pain status: Unable to assess, pt non-verbal    4. Return to near baseline physical activity: No     Discharge Planner Nurse   Safe discharge environment identified: Yes  Barriers to discharge: Yes       Entered by: Kerri Rudolph RN 07/24/2022      Please review provider order for any additional goals.   Nurse to notify provider when observation goals have been met and patient is ready for discharge.      Pt is nonverbal, sitter at bedside throughout night. No episodes of vomiting. Lungs coarse. On continuous pulse ox - bradycardic and fluctuating O2 overnight. Improves slightly with repositioning. External catheter in place. Will continue to monitor.

## 2022-07-24 NOTE — UTILIZATION REVIEW
Admission Status; Secondary Review Determination         Under the authority of the Utilization Management Committee, the utilization review process indicated a secondary review on the above patient.  The review outcome is based on review of the medical records, discussions with staff, and applying clinical experience noted on the date of the review.        (x)      Inpatient Status Appropriate - This patient's medical care is consistent with medical management for inpatient care and reasonable inpatient medical practice.      RATIONALE FOR DETERMINATION   The patient is a 57-year-old male admitted on 7/22/2022.  Patient came to the ED by EMS because of some coughing and then emesis with respiratory difficulty.  SPO2 was 85 on room air at the group home and the patient was given DuoNeb by the home staff for than sent to the ED.  Patient has had recurrent aspiration pneumonia and the patient did require 3 L of O2 earlier today.  The patient was placed on antibiotics given high risk and recent history of aspiration pneumonia patient does have severe cerebral palsy with developmental delay.  Blood gas is normal and white count is 10.6 hemoglobin is low at 9.5 today.  Blood cultures are pending.  Based on intermittent hypoxia requiring O2 with need for further evaluation for underlying cause, recommend advancing to inpatient status.  An [employer] paging text to be sent to Zehra Arellano with this recommendation and to call me if there are any questions.      The severity of illness, intensity of service provided, expected LOS and risk for adverse outcome make the care complex, high risk and appropriate for hospital admission.        The information on this document is developed by the utilization review team in order for the business office to ensure compliance.  This only denotes the appropriateness of proper admission status and does not reflect the quality of care rendered.         The definitions of Inpatient  Status and Observation Status used in making the determination above are those provided in the CMS Coverage Manual, Chapter 1 and Chapter 6, section 70.4.      Sincerely,     Yadiel Singh MD  Physician Advisor  Utilization Review/ Case Management  [facility].

## 2022-07-24 NOTE — PLAN OF CARE
PRIMARY DIAGNOSIS: Aspiration Pneumnonitis  OUTPATIENT/OBSERVATION GOALS TO BE MET BEFORE DISCHARGE:  1. ADLs back to baseline: No    2. Activity and level of assistance: Bedrest    3. Pain status: Unable to assess, patient is non-verbal    4. Return to near baseline physical activity: No     Discharge Planner Nurse   Safe discharge environment identified: Yes  Barriers to discharge: Yes       Entered by: Shruthi Russ RN 07/23/2022   BP 96/50 (BP Location: Left arm)   Pulse 55   Temp 98.7  F (37.1  C) (Oral)   Resp 15   SpO2 97%     Patient is nonverbal, sitter at bedside due to mild agitation ad risk of aspiration and vomiting, Emesis x 3 this shift, IV Zofran administered, NP paged for Compazine, had all medications and some chocolate pudding this evening for dinner but vomited multiple times, lungs coarse, finally calm and sleeping in bed at this time.

## 2022-07-24 NOTE — CONSULTS
Care Management Initial Consult    General Information  Assessment completed with: Other (Guardian Suzanne),    Type of CM/SW Visit: Initial Assessment    Primary Care Provider verified and updated as needed: Yes   Readmission within the last 30 days: previous discharge plan unsuccessful      Reason for Consult: discharge planning  Advance Care Planning: Advance Care Planning Reviewed: present on chart          Communication Assessment  Patient's communication style: spoken language (English or Bilingual) (non-verbal)    Hearing Difficulty or Deaf: no   Wear Glasses or Blind: no    Cognitive  Cognitive/Neuro/Behavioral: .WDL except  Level of Consciousness: somnolent  Arousal Level: arouses to touch/gentle shaking  Orientation: disoriented x 4  Mood/Behavior: cooperative  Best Language: 3 - Mute  Speech: unable to speak    Living Environment:   People in home: other (see comments) (group home)     Current living Arrangements: group home      Able to return to prior arrangements: yes       Family/Social Support:  Care provided by: other (see comments) (nursing home staff)  Provides care for:    Marital Status: Single  Other (specify) (Guardian, group home staff)          Description of Support System:           Current Resources:   Patient receiving home care services: No     Community Resources: Group Home  Equipment currently used at home: wheelchair, power  Supplies currently used at home:      Employment/Financial:  Employment Status:          Financial Concerns:             Lifestyle & Psychosocial Needs:  Social Determinants of Health     Tobacco Use: Low Risk      Smoking Tobacco Use: Never Smoker     Smokeless Tobacco Use: Never Used   Alcohol Use: Not on file   Financial Resource Strain: Not on file   Food Insecurity: Not on file   Transportation Needs: Not on file   Physical Activity: Not on file   Stress: Not on file   Social Connections: Not on file   Intimate Partner Violence: Not on file   Depression: Not  on file   Housing Stability: Not on file       Functional Status:  Prior to admission patient needed assistance:   Dependent ADLs:: Wheelchair-independent, Eating  Dependent IADLs:: Cleaning, Cooking, Laundry, Shopping, Meal Preparation, Medication Management, Money Management, Transportation  Assesssment of Functional Status: At functional baseline    Mental Health Status:  Mental Health Status: No Current Concerns       Chemical Dependency Status:  Chemical Dependency Status: No Current Concerns             Values/Beliefs:  Spiritual, Cultural Beliefs, Jehovah's witness Practices, Values that affect care:                 Additional Information:  Spoke with Guardian Suzanne, the goal is for patient to return to [facility], [facility], [phone].  Patient discharged on 7/22 and came back later after aspiration.  Suzanne wants to speak with Palliative Care and determine a plan as this aspiration is likely to continue.  The group [place] uses [facility] and likely will be able to transport patient at discharge with his own wheelchair.  SW to follow for discharge needs.    MARIANGEL Hall  [phone]

## 2022-07-25 NOTE — PROGRESS NOTES
[facility]     Told by RN that pt had another episode of vomiting and suspected aspiration after taking meds with pudding. Hx reviewed and pt has had repeated episodes of n/v aspiration PNA.   Did desaturate briefly into the 80's but now back to the 90's  Already on Augmentin, CXR earlier showed no aspiration.   Looks like will have palliative care consul tomorrow to discuss goals of care given recurrent episodes of aspiration.   Will put him as strict NPO for now till we clarify goals of care, repeat CXR in am.

## 2022-07-25 NOTE — PROGRESS NOTES
[facility]    Medicine Progress Note - Hospitalist Service    Date of Admission:  7/22/2022    Assessment & Plan        Benito Marx is a 57 year old male with a history of cerebral palsy, partial quadriplegia, nonverbal state, dysphagia with recurrent aspiration episodes, previous recurrent nausea/vomiting, severe esophagitis on PPI who was admitted on 7/22/2022 with an aspiration event leading to an isolated fever and brief hypoxia.    Aspiration event: He was recently hospitalized for similar issues.  He has known dysphagia with recurrent aspiration events.  Currently he is afebrile.  No evidence of pneumonia on chest x-ray.    - currently weaned to room air, intermittently hypoxic when vomiting but usually resolves quickly with supplemental oxygen and suction as needed   - continue Augmentin that he was recently given on discharge 7/21  - placed NPO overnight due to vomiting, restart IVFs   - due to his cerebral palsy and dysphagia suspect these aspiration events will continue to happen  - with known agitation and pulling at lines/tubes, tube feeds would unlikely be in the patient's best interest     - seen by SLP, recommending pureed diet with extremely thick liquids initially but agree with NPO except for minimal clear liquid due to vomiting  - previous video swallow on 7/19 showed length aspiration with mildly thick barium     - Palliative care consultation, seen on 7/25, patient's guardian plans to discuss further goals of care with family (please refer to their note)    Recurrent nausea/vomiting: ongoing vomiting overnight and thick secretions requiring suction. Patient has known history of hiatal hernia. Per guardian this has been an ongoing issue for the patient and previously been behavioral in origin.   - suspect multifactorial related to known hiatal hernia, esophagitis, and behavioral  - diet as tolerated  - PRN antiemetics, Zofran and Reglan PRN, trial addition of Scopolamine  patch    - trial addition of carafate with meals with diet resumed in case esophagitis is contributing     Acute hypoxic respiratory failure: intermittent with vomiting and patient having a hard time protecting his airway.   - currently requiring 1 liter supplemental oxygen, wean as tolerated.    Severe esophagitis: noted on EGD performed on 7/12 during previous hospital stay  - continue PTA Protonix 40 mg BID  - trial Carafate with meals     Cerebral palsy, partial quadriplegia: Resume baseline medications. Resides at group home.     Diet: NPO for Medical/Clinical Reasons Except for: Meds    DVT Prophylaxis: Pneumatic Compression Devices  Luke Catheter: Not present  Central Lines: None  Cardiac Monitoring: None  Code Status: Full Code      Disposition Plan      Expected Discharge Date: 07/26/2022      Destination: group home        The patient's care was discussed with the Bedside Nurse, Patient and legal guardian.    Zehra Arellano PA-C  Hospitalist Service  [facility]    Clinically Significant Risk Factors Present on Admission                     ______________________________________________________________________    Interval History   Patient is nonverbal but able to squeeze writer's hand reporting that he is having nausea.  Denies pain when asked to squeeze hand.    Notified by nurse that patient had large darker colored emesis, in setting of mostly consuming pudding when he was last tolerating a diet before being made NPO.  Patient required oral suction to clear her airway.    Called and updated the patient's guardian Suzanne, she is planning to contact the patient's family and discuss goals of care.  She plans to be in touch with palliative for further discussions.     Data reviewed today: I reviewed all medications, new labs and imaging results over the last 24 hours.    Physical Exam   Vital Signs: Temp: 98.1  F (36.7  C) Temp src: Axillary BP: (!) 138/97 Pulse: 70   Resp: 18  SpO2: 94 % O2 Device: Nasal cannula Oxygen Delivery: 1 LPM  Weight: 0 lbs 0 oz  GENERAL: No apparent distress. Awake, alert, nonverbal.  HEENT: Normocephalic, atraumatic. Extraocular movements intact.  CARDIOVASCULAR: Regular rate and rhythm without murmurs or rubs. No S3.  PULMONARY: CTAB  GASTROINTESTINAL: Soft, non-tender, non-distended. Bowel sounds normoactive.   EXTREMITIES: No cyanosis or clubbing. No edema.  NEUROLOGICAL: CN 2-12 grossly intact, baseline neurological deficits.  DERMATOLOGICAL: No rash, ulcer, bruising, nor jaundice.     Data    Results for orders placed or performed during the hospital encounter of 07/22/22   XR Chest Port 1 View     Status: None    Narrative    EXAM: XR CHEST PORT 1 VIEW  LOCATION: [facility]  DATE/TIME: 7/22/2022 9:10 PM    INDICATION: Hypoxia.  COMPARISON: 7/16/2022.      Impression    IMPRESSION: The patient is rotated to the left on this exam. Heart size appears stable. Pulmonary vascularity is within normal limits. No pneumothorax. Thoracic curve. Surgical clips RUQ.   XR Chest Port 1 View     Status: None    Narrative    EXAM: XR CHEST PORT 1 VIEW  LOCATION: [facility]  DATE/TIME: 7/25/2022 6:19 AM    INDICATION: r o aspiration PNA. Shortness of breath.  COMPARISON: 07/22/2022.      Impression    IMPRESSION: The lungs are clear of focal consolidation. There is no pneumothorax or pleural effusion. The heart size and pulmonary vascularity are normal. There is curvature of the thoracic spine convex to the right. There has been no significant change   from 07/22/2022.   CBC (platelets, no diff)     Status: Abnormal   Result Value Ref Range    WBC Count 10.6 4.0 - 11.0 10e3/uL    RBC Count 4.22 (L) 4.40 - 5.90 10e6/uL    Hemoglobin 10.8 (L) 13.3 - 17.7 g/dL    Hematocrit 36.2 (L) 40.0 - 53.0 %    MCV 86 78 - 100 fL    MCH 25.6 (L) 26.5 - 33.0 pg    MCHC 29.8 (L) 31.5 - 36.5 g/dL    RDW 15.5 (H) 10.0 - 15.0 %    Platelet Count  230 150 - 450 10e3/uL   Basic metabolic panel (BMP)     Status: Abnormal   Result Value Ref Range    Sodium 139 133 - 144 mmol/L    Potassium 3.9 3.4 - 5.3 mmol/L    Chloride 108 94 - 109 mmol/L    Carbon Dioxide (CO2) 28 20 - 32 mmol/L    Anion Gap 3 3 - 14 mmol/L    Urea Nitrogen 17 7 - 30 mg/dL    Creatinine 0.64 (L) 0.66 - 1.25 mg/dL    Calcium 8.9 8.5 - 10.1 mg/dL    Glucose 90 70 - 99 mg/dL    GFR Estimate >90 >60 mL/min/1.73m2   Lactic acid whole blood     Status: Normal   Result Value Ref Range    Lactic Acid 1.5 0.7 - 2.0 mmol/L   Blood gas venous and oxyhgb     Status: Abnormal   Result Value Ref Range    pH Venous 7.43 7.32 - 7.43    pCO2 Venous 45 40 - 50 mm Hg    pO2 Venous 37 25 - 47 mm Hg    Bicarbonate Venous 29 (H) 21 - 28 mmol/L    FIO2 4     Oxyhemoglobin Venous 64 (L) 70 - 75 %    Base Excess/Deficit (+/-) 4.3 (H) -7.7 - 1.9 mmol/L   Extra Tube ([facility] Draw)     Status: None    Narrative    The following orders were created for panel order Extra Tube ([facility] Draw).  Procedure                               Abnormality         Status                     ---------                               -----------         ------                     Extra Blue Top Tube[[id number]]                              Final result               Extra Red Top Tube[[id number]]                               Final result                 Please view results for these tests on the individual orders.   Extra Blue Top Tube     Status: None   Result Value Ref Range    Hold Specimen JIC    Extra Red Top Tube     Status: None   Result Value Ref Range    Hold Specimen JIC    Symptomatic; Auto-generated order Influenza A/B & SARS-CoV2 (COVID-19) Virus PCR Multiplex Nasopharyngeal     Status: Normal    Specimen: Nasopharyngeal; Swab   Result Value Ref Range    Influenza A PCR Negative Negative    Influenza B PCR Negative Negative    RSV PCR Negative Negative    SARS CoV2 PCR Negative Negative    Narrative    Testing was performed  using the Xpert Xpress CoV2/Flu/RSV Assay on the [employer] GeneXpert Instrument. This test should be ordered for the detection of SARS-CoV-2 and influenza viruses in individuals who meet clinical and/or epidemiological criteria. Test performance is unknown in asymptomatic patients. This test is for in vitro diagnostic use under the FDA EUA for laboratories certified under CLIA to perform high or moderate complexity testing. This test has not been FDA cleared or approved. A negative result does not rule out the presence of PCR inhibitors in the specimen or target RNA in concentration below the limit of detection for the assay. If only one viral target is positive but coinfection with multiple targets is suspected, the sample should be re-tested with another FDA cleared, approved, or authorized test, if coinfection would change clinical management. This test was validated by the [facility]. These laboratories are certified under the Clinical  Laboratory Improvement Amendments of 1988 (CLIA-88) as qualified to perform high complexity laboratory testing.   Basic metabolic panel     Status: Abnormal   Result Value Ref Range    Sodium 141 133 - 144 mmol/L    Potassium 3.7 3.4 - 5.3 mmol/L    Chloride 110 (H) 94 - 109 mmol/L    Carbon Dioxide (CO2) 28 20 - 32 mmol/L    Anion Gap 3 3 - 14 mmol/L    Urea Nitrogen 16 7 - 30 mg/dL    Creatinine 0.62 (L) 0.66 - 1.25 mg/dL    Calcium 7.9 (L) 8.5 - 10.1 mg/dL    Glucose 98 70 - 99 mg/dL    GFR Estimate >90 >60 mL/min/1.73m2   CBC with platelets     Status: Abnormal   Result Value Ref Range    WBC Count 6.6 4.0 - 11.0 10e3/uL    RBC Count 3.69 (L) 4.40 - 5.90 10e6/uL    Hemoglobin 9.5 (L) 13.3 - 17.7 g/dL    Hematocrit 32.2 (L) 40.0 - 53.0 %    MCV 87 78 - 100 fL    MCH 25.7 (L) 26.5 - 33.0 pg    MCHC 29.5 (L) 31.5 - 36.5 g/dL    RDW 15.9 (H) 10.0 - 15.0 %    Platelet Count 185 150 - 450 10e3/uL   Blood Culture Arm, Left     Status: Normal (Preliminary result)     Specimen: Arm, Left; Blood   Result Value Ref Range    Culture No growth after 2 days    Blood Culture Peripheral Blood     Status: Normal (Preliminary result)    Specimen: Peripheral Blood   Result Value Ref Range    Culture No growth after 2 days

## 2022-07-25 NOTE — PROGRESS NOTES
Chart reviewed and patient not likely appropriate for swallowing trials with PO due to 3x vomiting events overnight.  Paged MD re: consideration for GI consultation for vomiting as patient had previously tolerated PO intake during last admission recently (but with significant vomiting hx, ? Spasticity or behavioral cause), vomiting not appearing related to oropharyngeal swallow dysfunction but with concern about reflux-related etiology and then posing aspiration and aspiration pneumonia risks with vomiting.  Recommend goals of care discussion re: pleasure PO intake knowing vomiting will continue to pose high aspiration pneumonia risks.      Recommend continue NPO with minimal clear liquid (extremely thick texture) trials for oral comfort when patient alert and upright.

## 2022-07-25 NOTE — PLAN OF CARE
Pt alert most of shift- mute at baseline- able to squeeze hand when asked direct questions and even shook head yes a few times during shift. Strict NPO most of shift- was going to attempt PO meds crushed in pudding but pt dry heaving and had 2 emesis this shift. Difficulty tolerating secretions so fabi set up and utilized. Repositioning although pt favors laying on L side. Chest XR done this am- WNL. Intermittent hypoxia so 1L O2 NC placed to keep sats >90%. Tachycardic at times throughout shift up to 120s. Speech and palliative following- hopefully discuss goals of care tomorrow am.

## 2022-07-25 NOTE — CONSULTS
"    [facility]  Palliative Care Consultation   Text Page    Assessment & Plan   Benito Marx is a 57 year old male who was admitted on 7/22/2022.   Consulted by Hospitalist Cisco Scott DO to assist with goals of care.    Recommendations:  1. Goals of Care- Full Code - Restorative care  Hospitalization goals discussed with the patient's guardian Suzanne Wells, who plans to have a discussion with the family about goals of care.  Decisional Capacity- Unreliable. Patient has a Guardian, Suzanne Wells from [facility] [place].  See paperwork scanned under ACP tab.  All medical decisions and consents should be addressed to Guardian.  POLST - Consider completion prior to dismissal    2. Dysphagia - Recurrent admission for aspiration pneumonia. Appreciate recommendation for NPO per  Speech Therapist Jessie Skelton, SLP. Guardian aware of risk of aspiration and plans to discuss with the patient's family.     3. Dyspnea - Respirations currently unlabored.  Guardian will discuss code status with family, given risk for aspiration.       4. Generalized weakness - Cerebral palsy with partial quadriplegia, wheelchair bound and resides at group home.     5. Spiritual Care  Oriented to Spiritual Health as part of Palliative Care team. Spiritual Health Services declined at this time.  Spiritual Background: No Episcopal    6. Care Planning  Appreciate input of  Link Ramirez as patient resides at group home.   Medications for discharge - TBD.    Medical Decision Making and Goals of Care:  Discussed on July 25, 2022 with BERYL Ritter CNS:     After completing a physical assessment, I phoned the patient's guardian Suzanne Wells at [phone]. She acknowledge she is aware of palliative care. \"I'm going to talk to the family on Friday.\"  I offered concern as Speech Therapy recommends NPO. Offered concern regarding the possibility of intubation, as Blaze had an episode of " vomiting with recurrent aspiration.  Suzanne plans to contact the family sooner. I offered to assist in discussing options moving forward. Suzanne took my name and phone number and plans to connect with Blaze's family.     Thank you for involving us in the patient's care.     Dean Bain MS, RN, CNS, APRN, ACHPN, FAACVPR  Pain and Palliative Care  Pager [phone]  Office [phone]     Time Spent on this Encounter   Total unit/floor time 2 PM until 3 PM minutes, time consisted of the following, examination of the patient, reviewing the record and completing documentation. >50% of time spent in counseling and coordination of care, Bedside Nurse aPz Wise RN, Hospitalist Zehra Arellano PA-C and  NILAM Lovett.  Time spend counseling with guardian consisted of the following topics, goals of care, education about diagnosis and education about prognosis.    Understanding of disease process:   This has been discussed with the patient's guardian has basic understanding of prognosis.      History of Present Illness   History is obtained from the electronic health record and patient's advocate    Benito Marx is a 57 year old male who was admitted 7/22/2022 after an apparent aspiration event at his group home with transient hypoxia. The patient had been dismissed earlier in the day for aspiration pneumonia. His medical history is significant for cerebral palsy, partial quadriplegia, dysphagia (on aspiration precautions).     This is the patient's third hospitalization this month as he was seen July 5 through 8 with fecal impaction; July 10 through 22 with aspiration pneumonia (underwent EGD on July 12 which demonstrated reflux esophagitis and a large hiatal hernia)    Past Medical History   I have reviewed this patient's medical history and updated it with pertinent information if needed.   Past Medical History:   Diagnosis Date     Adjustment disorder with depressed mood      Cerebral palsy  (H)      Constipated      GERD (gastroesophageal reflux disease)      History of aspiration pneumonia      Mental retardation, moderate (I.Q. 35-49)     prenatal hypoxia     Seizures (H)     LAST Sz 1994, No AEM as of 10.15.18       Past Surgical History   I have reviewed this patient's surgical history and updated it with pertinent information if needed.  Past Surgical History:   Procedure Laterality Date     CHOLECYSTECTOMY       ESOPHAGOSCOPY, GASTROSCOPY, DUODENOSCOPY (EGD), COMBINED N/A 7/12/2022    Procedure: Esophagogastroduodenoscopy;  Surgeon: Zi Faith MD;  Location:  OR       Prior to Admission Medications   Prior to Admission Medications   Prescriptions Last Dose Informant Patient Reported? Taking?   LORazepam (ATIVAN) 0.5 MG tablet   Yes Yes   Sig: Take 0.5 mg by mouth 2 times daily as needed for agitation or anxiety   OLANZapine (ZYPREXA) 2.5 MG tablet 7/21/2022 at Unknown time  Yes Yes   Sig: Take 2.5 mg by mouth At Bedtime   albuterol (PROVENTIL) (2.5 MG/3ML) 0.083% neb solution 7/22/2022 at Unknown time  Yes Yes   Sig: Take 2.5 mg by nebulization 2 times daily    amoxicillin-clavulanate (AUGMENTIN) 875-125 MG tablet   No Yes   Sig: Take 1 tablet by mouth 2 times daily   bisacodyl (DULCOLAX) 10 MG suppository 7/18/2022  No Yes   Sig: Place 1 suppository (10 mg) rectally daily as needed for constipation   carbamide peroxide (DEBROX) 6.5 % otic solution   Yes Yes   Sig: Place 5-10 drops into both ears every 30 days   cetirizine (ZYRTEC) 10 MG tablet 7/22/2022 at Unknown time  Yes Yes   Sig: Take 10 mg by mouth daily   cholecalciferol 25 MCG (1000 UT) TABS   Yes Yes   Sig: Take 1,000 Units by mouth daily   citalopram (CELEXA) 40 MG tablet   Yes Yes   Sig: Take 40 mg by mouth daily   diclofenac (VOLTAREN) 1 % topical gel 7/22/2022 at Unknown time  Yes Yes   Sig: Apply 1 g topically 2 times daily . To each knee   fluticasone (FLONASE) 50 MCG/ACT nasal spray 7/22/2022 at Unknown time  Yes Yes   Sig:  Spray 2 sprays into both nostrils daily   magnesium hydroxide (MILK OF MAGNESIA) 400 MG/5ML suspension 7/20/2022  Yes Yes   Sig: Take 30 mLs by mouth daily   montelukast (SINGULAIR) 10 MG tablet 7/21/2022 at Unknown time  Yes Yes   Sig: Take 10 mg by mouth At Bedtime   olopatadine (PATANOL) 0.1 % ophthalmic solution 7/22/2022 at Unknown time  Yes Yes   Sig: Place 1 drop Into the left eye 2 times daily   ondansetron (ZOFRAN ODT) 4 MG ODT tab   No Yes   Sig: Take 1 tablet (4 mg) by mouth every 4 hours as needed for nausea or vomiting   pantoprazole (PROTONIX) 20 MG EC tablet   Yes Yes   Sig: Take 20 mg by mouth 2 times daily   polyethylene glycol (MIRALAX) 17 GM/Dose powder 7/19/2022  No Yes   Sig: Take 17 g by mouth 2 times daily Can increase up to 3times a day if no BM with BID dosing   senna (SENOKOT) 8.6 MG tablet 7/22/2022 at Unknown time  Yes Yes   Sig: Take 2 tablets by mouth 2 times daily    sodium fluoride dental gel (PREVIDENT) 1.1 % GEL topical gel   Yes Yes   Sig: Apply to affected area At Bedtime   tiZANidine (ZANAFLEX) 4 MG tablet 7/22/2022 at Unknown time  Yes Yes   Sig: Take 4 mg by mouth 3 times daily      Facility-Administered Medications: None     Allergies   Allergies   Allergen Reactions     Nutritional Supplements Nausea and Vomiting     increased vomiting when taking this.     Seasonal Allergies        Social History        Living situation: [facility]       Support system: Group home care providers  History of substance use/abuse:  reports that he has never smoked. He has never used smokeless tobacco. reports no history of alcohol use.    Family History   I have reviewed this patient's family history and updated it with pertinent information if needed.   Family History   Problem Relation Age of Onset     No Known Problems Mother      No Known Problems Father      No Known Problems Maternal Grandmother      No Known Problems Maternal Grandfather      No Known Problems Paternal  Grandmother      No Known Problems Paternal Grandfather      No Known Problems Brother        Review of Systems   Review of systems not obtained due to patient factors - mental status    Physical Exam   Temp:  [97.8  F (36.6  C)-98.6  F (37  C)] 98.1  F (36.7  C)  Pulse:  [] 70  Resp:  [18-20] 18  BP: (118-145)/(79-97) 138/97  SpO2:  [85 %-95 %] 94 %  0 lbs 0 oz  Exam:  GEN:  Cachetic and frail  male. Non-verbal, opens eyes and appears comfortable, no apparent distress.  HEENT:  Normocephalic/atraumatic, no scleral icterus, no nasal discharge, mouth moist.  CV:  RRR, S1, S2; no murmurs or other irregularities noted.  +3 DP/PT pulses bilaterally; no edema BLE.  RESP:  Clear to auscultation bilaterally without rales/rhonchi/wheezing/retractions.  Symmetric chest rise on inhalation noted.  Normal respiratory effort.  ABD:  Rounded, soft, non-tender/non-distended.  +BS  EXT:  Significant contractures.   M/S:   No wincing or grimace with palpation of extremities and spine.     SKIN:  Warm and dry to touch, no exanthems noted in the visualized areas.    Psych:  Non-verbal and does not follow commands.      Data   Results for orders placed or performed during the hospital encounter of 07/22/22   XR Chest Port 1 View     Status: None    Narrative    EXAM: XR CHEST PORT 1 VIEW  LOCATION: [facility]  DATE/TIME: 7/22/2022 9:10 PM    INDICATION: Hypoxia.  COMPARISON: 7/16/2022.      Impression    IMPRESSION: The patient is rotated to the left on this exam. Heart size appears stable. Pulmonary vascularity is within normal limits. No pneumothorax. Thoracic curve. Surgical clips RUQ.   XR Chest Port 1 View     Status: None    Narrative    EXAM: XR CHEST PORT 1 VIEW  LOCATION: [facility]  DATE/TIME: 7/25/2022 6:19 AM    INDICATION: r o aspiration PNA. Shortness of breath.  COMPARISON: 07/22/2022.      Impression    IMPRESSION: The lungs are clear of focal consolidation.  There is no pneumothorax or pleural effusion. The heart size and pulmonary vascularity are normal. There is curvature of the thoracic spine convex to the right. There has been no significant change   from 07/22/2022.   CBC (platelets, no diff)     Status: Abnormal   Result Value Ref Range    WBC Count 10.6 4.0 - 11.0 10e3/uL    RBC Count 4.22 (L) 4.40 - 5.90 10e6/uL    Hemoglobin 10.8 (L) 13.3 - 17.7 g/dL    Hematocrit 36.2 (L) 40.0 - 53.0 %    MCV 86 78 - 100 fL    MCH 25.6 (L) 26.5 - 33.0 pg    MCHC 29.8 (L) 31.5 - 36.5 g/dL    RDW 15.5 (H) 10.0 - 15.0 %    Platelet Count 230 150 - 450 10e3/uL   Basic metabolic panel (BMP)     Status: Abnormal   Result Value Ref Range    Sodium 139 133 - 144 mmol/L    Potassium 3.9 3.4 - 5.3 mmol/L    Chloride 108 94 - 109 mmol/L    Carbon Dioxide (CO2) 28 20 - 32 mmol/L    Anion Gap 3 3 - 14 mmol/L    Urea Nitrogen 17 7 - 30 mg/dL    Creatinine 0.64 (L) 0.66 - 1.25 mg/dL    Calcium 8.9 8.5 - 10.1 mg/dL    Glucose 90 70 - 99 mg/dL    GFR Estimate >90 >60 mL/min/1.73m2   Lactic acid whole blood     Status: Normal   Result Value Ref Range    Lactic Acid 1.5 0.7 - 2.0 mmol/L   Blood gas venous and oxyhgb     Status: Abnormal   Result Value Ref Range    pH Venous 7.43 7.32 - 7.43    pCO2 Venous 45 40 - 50 mm Hg    pO2 Venous 37 25 - 47 mm Hg    Bicarbonate Venous 29 (H) 21 - 28 mmol/L    FIO2 4     Oxyhemoglobin Venous 64 (L) 70 - 75 %    Base Excess/Deficit (+/-) 4.3 (H) -7.7 - 1.9 mmol/L   Extra Tube ([facility] Draw)     Status: None    Narrative    The following orders were created for panel order Extra Tube ([facility] Draw).  Procedure                               Abnormality         Status                     ---------                               -----------         ------                     Extra Blue Top Tube[[id number]]                              Final result               Extra Red Top Tube[[id number]]                               Final result                 Please view  results for these tests on the individual orders.   Extra Blue Top Tube     Status: None   Result Value Ref Range    Hold Specimen JIC    Extra Red Top Tube     Status: None   Result Value Ref Range    Hold Specimen JIC    Symptomatic; Auto-generated order Influenza A/B & SARS-CoV2 (COVID-19) Virus PCR Multiplex Nasopharyngeal     Status: Normal    Specimen: Nasopharyngeal; Swab   Result Value Ref Range    Influenza A PCR Negative Negative    Influenza B PCR Negative Negative    RSV PCR Negative Negative    SARS CoV2 PCR Negative Negative    Narrative    Testing was performed using the Xpert Xpress CoV2/Flu/RSV Assay on the Cepheid GeneXpert Instrument. This test should be ordered for the detection of SARS-CoV-2 and influenza viruses in individuals who meet clinical and/or epidemiological criteria. Test performance is unknown in asymptomatic patients. This test is for in vitro diagnostic use under the FDA EUA for laboratories certified under CLIA to perform high or moderate complexity testing. This test has not been FDA cleared or approved. A negative result does not rule out the presence of PCR inhibitors in the specimen or target RNA in concentration below the limit of detection for the assay. If only one viral target is positive but coinfection with multiple targets is suspected, the sample should be re-tested with another FDA cleared, approved, or authorized test, if coinfection would change clinical management. This test was validated by the [facility]. These laboratories are certified under the Clinical  Laboratory Improvement Amendments of 1988 (CLIA-88) as qualified to perform high complexity laboratory testing.   Basic metabolic panel     Status: Abnormal   Result Value Ref Range    Sodium 141 133 - 144 mmol/L    Potassium 3.7 3.4 - 5.3 mmol/L    Chloride 110 (H) 94 - 109 mmol/L    Carbon Dioxide (CO2) 28 20 - 32 mmol/L    Anion Gap 3 3 - 14 mmol/L    Urea Nitrogen 16 7 - 30 mg/dL     Creatinine 0.62 (L) 0.66 - 1.25 mg/dL    Calcium 7.9 (L) 8.5 - 10.1 mg/dL    Glucose 98 70 - 99 mg/dL    GFR Estimate >90 >60 mL/min/1.73m2   CBC with platelets     Status: Abnormal   Result Value Ref Range    WBC Count 6.6 4.0 - 11.0 10e3/uL    RBC Count 3.69 (L) 4.40 - 5.90 10e6/uL    Hemoglobin 9.5 (L) 13.3 - 17.7 g/dL    Hematocrit 32.2 (L) 40.0 - 53.0 %    MCV 87 78 - 100 fL    MCH 25.7 (L) 26.5 - 33.0 pg    MCHC 29.5 (L) 31.5 - 36.5 g/dL    RDW 15.9 (H) 10.0 - 15.0 %    Platelet Count 185 150 - 450 10e3/uL   Blood Culture Arm, Left     Status: Normal (Preliminary result)    Specimen: Arm, Left; Blood   Result Value Ref Range    Culture No growth after 2 days    Blood Culture Peripheral Blood     Status: Normal (Preliminary result)    Specimen: Peripheral Blood   Result Value Ref Range    Culture No growth after 2 days

## 2022-07-25 NOTE — PLAN OF CARE
PRIMARY DIAGNOSIS: ASPIRATION PNEUMONITIS  OUTPATIENT/OBSERVATION GOALS TO BE MET BEFORE DISCHARGE:  1. ADLs back to baseline: No    2. Activity and level of assistance: Bedrest    3. Pain status: Unable to assess, pt is non-verbal    4. Return to near baseline physical activity: No     Discharge Planner Nurse   Safe discharge environment identified: Yes  Barriers to discharge: Yes       Entered by: Kirstie Benedict RN 07/25/2022 5:40 AM     Patient is non-verbal at baseline, unable to assess orientation, VSS, bedrest here, assist x2 with lift assist, strict NPO after an episode of emesis that may have lead to aspiration, has had 3 more episodes of vomiting on this shift, has been gaging and almost making himself vomit, IV in the Lt arm is saline locked, sitter at bedside, palliative consult today, will continue to monitor and provide cares.      Please review provider order for any additional goals.   Nurse to notify provider when observation goals have been met and patient is ready for discharge.Goal Outcome Evaluation:

## 2022-07-26 NOTE — CONSULTS
Care Management Follow Up    Expected Discharge Date: 07/27/2022     Concerns to be Addressed: Discharge planning     Patient plan of care discussed at interdisciplinary rounds: Yes    Anticipated Discharge Disposition:  Return to  with Hospice     Anticipated Discharge Services:  Hospice  Anticipated Discharge DME:  TBD    Patient/family educated on Medicare website which has current facility and service quality ratings:  Yes  Education Provided on the Discharge Plan:  Yes  Patient/Family in Agreement with the Plan: Yes      Referrals Placed by CM/SW:  None at this time  Private pay costs discussed: Not applicable    Additional Information:  SW received update from Palliative care that guardian has discussed with family and are electing for a comfort based eating approach and enrollment in hospice. Guardian does not have a hospice agency preference and is unsure if [facility] has a preference.    SW placed call to Irene Palacios [phone],  left. SW placed call to  [facility], [phone]. Their [facility] does not have a hospice agency preference. They are able to administer liquid medications. They deferred discharge planning to  Ida ([phone]) and  Sonia ([phone]).     SW met with Ida and Sonia at bedside briefly. They are requesting to meet with Palliative care and have questions answered prior to discussing discharge planning with hospice enrollment. Paged Palliative care who will be meeting with  staff shortly.     SW will continue to follow for discharge planning back to group [place] with hospice.    1520: SW spoke with irene Palacios via phone. She is agreeable to a referral being sent to [facility] as she has worked with them previously. Initial hospice referral sent via email to [facility] liaison Michelle.     1540: Met with  staff at bedside. They are requesting DME: O2 and air pressure mat. Group Home staff will plan to transport at 11:30am  tomorrow, 7/27. Updated hospice liaison and requested an enrollment at home between 12-1pm tomorrow. She is processing referral and will update SW on availability for tomorrow. 48 hours of comfort meds to be filled and sent with patient. DME will be delivered this evening or tomorrow morning.     1610: Notified by  staff that pt will also need a hospital bed as his current hospital bed is not working. Emailed hospice liaison to request that this also be ordered.     NILAM Lovett, [facility]   Inpatient Care Coordination  [facility]   [phone]

## 2022-07-26 NOTE — PLAN OF CARE
Pt alert throughout shift. Continues to sign eat, and ice cream during the day- NPO except meds which were crushed and put in pudding d/t awaiting guardian to have goals of care conversation with family.  PRN reglan given x1 when pt seemingly more restless and thought to be nauseous.  No emesis this shift. Speech and palliative following. Sitter at bedside

## 2022-07-26 NOTE — PROGRESS NOTES
[facility]  Medicine Progress Note - Hospitalist Service  Date of Admission:  7/22/2022    Assessment & Plan        Mr. Blaze Marx is a 57 year old male with a history of cerebral palsy, partial quadriplegia, nonverbal state, dysphagia with recurrent aspiration episodes, previous recurrent nausea/vomiting, severe esophagitis on PPI who was admitted on 7/22/2022 with an aspiration event leading to an isolated fever and brief hypoxia.  After goals of care discussion with legal guardian, patient was transition to hospice cares with plans to discharge back to facility on 7/27 with hospice.  We will adjust medications on discharge for hospice cares.    At risk for aspiration events   Cerebral palsy  Dysphagia  He was recently hospitalized for similar issues.  He has known dysphagia with recurrent aspiration events. previous video swallow on 7/19 showed length aspiration with mildly thick barium. seen by SLP, recommending pureed diet with extremely thick liquids initially but agree with NPO except for minimal clear liquid due to vomiting.Currently he is afebrile.  No evidence of pneumonia on chest x-ray. weaned to room air, intermittently hypoxic when vomiting but usually resolves quickly with supplemental oxygen and suction as needed.  Goals of care discussion had with the assistance of palliative providers, it was decided that tube feeds or PEG tube would not align with the patient's goals and he was subsequently transition to hospice cares.  - continue Augmentin to complete already initiated treatment course (end date 7/27)  - allow oral intake with known aspiration risk as per hospice goals  -Hospice cares on discharge    Recurrent nausea/vomiting: ongoing vomiting overnight and thick secretions requiring suction. Patient has known history of hiatal hernia. Per guardian this has been an ongoing issue for the patient and previously been behavioral in origin. suspect multifactorial related to  known hiatal hernia, esophagitis, and behavioral.   - diet as tolerated  - PRN antiemetics, Zofran and Reglan PRN, trial addition of Scopolamine patch    - trial addition of carafate with meals with diet resumed in case esophagitis is contributing     Acute hypoxic respiratory failure, resolved: intermittent with vomiting and patient having a hard time protecting his airway.  Not currently on oxygen requirement.    Severe esophagitis: noted on EGD performed on 7/12 during previous hospital stay  - PTA Protonix 40 mg BID  - trial Carafate with meals     Cerebral palsy, partial quadriplegia: PT medications. Resides at group home.     Diet: Regular Diet Adult Extremely Thick (level 4)    DVT Prophylaxis: Pneumatic Compression Devices  Luke Catheter: Not present  Central Lines: None  Cardiac Monitoring: None  Code Status: No CPR- Do NOT Intubate      Disposition Plan      Expected Discharge Date: 07/27/2022      Destination: group home        The patient's care was discussed with the Bedside Nurse, Patient and legal guardian.    Christen Sanchez DO  Hospitalist Service  [facility]    Clinically Significant Risk Factors Present on Admission                     ______________________________________________________________________    Interval History  -From patient and bedside nurse  Patient is nonverbal but able to squeeze writer's hand reporting that he is having back pain.    No reported episodes of emesis.  No other new concerns or complaints  History is limited secondary to the patient's difficulty communicating.    Data reviewed today: I reviewed all medications, new labs and imaging results over the last 24 hours.    Physical Exam   Vital Signs: Temp: 97.3  F (36.3  C) Temp src: Axillary BP: 93/47 Pulse: 53   Resp: 18 SpO2: 96 % O2 Device: None (Room air)    Weight: 0 lbs 0 oz  GENERAL: No apparent distress.  Sleeping although easily arousable, nonverbal.  HEENT: Normocephalic,  atraumatic. Extraocular movements intact.  CARDIOVASCULAR: Regular rate and rhythm without murmurs.  PULMONARY: Clear to auscultation bilaterally posteriorly  GASTROINTESTINAL: Soft, non-tender, non-distended.   EXTREMITIES: No cyanosis or clubbing. No edema.  NEUROLOGICAL: Sleepy but easily arousable, no focal deficits  DERMATOLOGICAL: No rash, ulcer, bruising, nor jaundice.     Data    Results for orders placed or performed during the hospital encounter of 07/22/22   XR Chest Port 1 View     Status: None    Narrative    EXAM: XR CHEST PORT 1 VIEW  LOCATION: [facility] [place]  DATE/TIME: 7/22/2022 9:10 PM    INDICATION: Hypoxia.  COMPARISON: 7/16/2022.      Impression    IMPRESSION: The patient is rotated to the left on this exam. Heart size appears stable. Pulmonary vascularity is within normal limits. No pneumothorax. Thoracic curve. Surgical clips RUQ.   XR Chest Port 1 View     Status: None    Narrative    EXAM: XR CHEST PORT 1 VIEW  LOCATION: [facility] [place]  DATE/TIME: 7/25/2022 6:19 AM    INDICATION: r o aspiration PNA. Shortness of breath.  COMPARISON: 07/22/2022.      Impression    IMPRESSION: The lungs are clear of focal consolidation. There is no pneumothorax or pleural effusion. The heart size and pulmonary vascularity are normal. There is curvature of the thoracic spine convex to the right. There has been no significant change   from 07/22/2022.   CBC (platelets, no diff)     Status: Abnormal   Result Value Ref Range    WBC Count 10.6 4.0 - 11.0 10e3/uL    RBC Count 4.22 (L) 4.40 - 5.90 10e6/uL    Hemoglobin 10.8 (L) 13.3 - 17.7 g/dL    Hematocrit 36.2 (L) 40.0 - 53.0 %    MCV 86 78 - 100 fL    MCH 25.6 (L) 26.5 - 33.0 pg    MCHC 29.8 (L) 31.5 - 36.5 g/dL    RDW 15.5 (H) 10.0 - 15.0 %    Platelet Count 230 150 - 450 10e3/uL   Basic metabolic panel (BMP)     Status: Abnormal   Result Value Ref Range    Sodium 139 133 - 144 mmol/L    Potassium 3.9 3.4 - 5.3 mmol/L     Chloride 108 94 - 109 mmol/L    Carbon Dioxide (CO2) 28 20 - 32 mmol/L    Anion Gap 3 3 - 14 mmol/L    Urea Nitrogen 17 7 - 30 mg/dL    Creatinine 0.64 (L) 0.66 - 1.25 mg/dL    Calcium 8.9 8.5 - 10.1 mg/dL    Glucose 90 70 - 99 mg/dL    GFR Estimate >90 >60 mL/min/1.73m2   Lactic acid whole blood     Status: Normal   Result Value Ref Range    Lactic Acid 1.5 0.7 - 2.0 mmol/L   Blood gas venous and oxyhgb     Status: Abnormal   Result Value Ref Range    pH Venous 7.43 7.32 - 7.43    pCO2 Venous 45 40 - 50 mm Hg    pO2 Venous 37 25 - 47 mm Hg    Bicarbonate Venous 29 (H) 21 - 28 mmol/L    FIO2 4     Oxyhemoglobin Venous 64 (L) 70 - 75 %    Base Excess/Deficit (+/-) 4.3 (H) -7.7 - 1.9 mmol/L   Extra Tube ([facility] Draw)     Status: None    Narrative    The following orders were created for panel order Extra Tube ([facility] Draw).  Procedure                               Abnormality         Status                     ---------                               -----------         ------                     Extra Blue Top Tube[[id number]]                              Final result               Extra Red Top Tube[[id number]]                               Final result                 Please view results for these tests on the individual orders.   Extra Blue Top Tube     Status: None   Result Value Ref Range    Hold Specimen JIC    Extra Red Top Tube     Status: None   Result Value Ref Range    Hold Specimen JIC    Symptomatic; Auto-generated order Influenza A/B & SARS-CoV2 (COVID-19) Virus PCR Multiplex Nasopharyngeal     Status: Normal    Specimen: Nasopharyngeal; Swab   Result Value Ref Range    Influenza A PCR Negative Negative    Influenza B PCR Negative Negative    RSV PCR Negative Negative    SARS CoV2 PCR Negative Negative    Narrative    Testing was performed using the Xpert Xpress CoV2/Flu/RSV Assay on the Cepheid GeneXpert Instrument. This test should be ordered for the detection of SARS-CoV-2 and influenza viruses in  individuals who meet clinical and/or epidemiological criteria. Test performance is unknown in asymptomatic patients. This test is for in vitro diagnostic use under the FDA EUA for laboratories certified under CLIA to perform high or moderate complexity testing. This test has not been FDA cleared or approved. A negative result does not rule out the presence of PCR inhibitors in the specimen or target RNA in concentration below the limit of detection for the assay. If only one viral target is positive but coinfection with multiple targets is suspected, the sample should be re-tested with another FDA cleared, approved, or authorized test, if coinfection would change clinical management. This test was validated by the [facility]. These laboratories are certified under the Clinical  Laboratory Improvement Amendments of 1988 (CLIA-88) as qualified to perform high complexity laboratory testing.   Basic metabolic panel     Status: Abnormal   Result Value Ref Range    Sodium 141 133 - 144 mmol/L    Potassium 3.7 3.4 - 5.3 mmol/L    Chloride 110 (H) 94 - 109 mmol/L    Carbon Dioxide (CO2) 28 20 - 32 mmol/L    Anion Gap 3 3 - 14 mmol/L    Urea Nitrogen 16 7 - 30 mg/dL    Creatinine 0.62 (L) 0.66 - 1.25 mg/dL    Calcium 7.9 (L) 8.5 - 10.1 mg/dL    Glucose 98 70 - 99 mg/dL    GFR Estimate >90 >60 mL/min/1.73m2   CBC with platelets     Status: Abnormal   Result Value Ref Range    WBC Count 6.6 4.0 - 11.0 10e3/uL    RBC Count 3.69 (L) 4.40 - 5.90 10e6/uL    Hemoglobin 9.5 (L) 13.3 - 17.7 g/dL    Hematocrit 32.2 (L) 40.0 - 53.0 %    MCV 87 78 - 100 fL    MCH 25.7 (L) 26.5 - 33.0 pg    MCHC 29.5 (L) 31.5 - 36.5 g/dL    RDW 15.9 (H) 10.0 - 15.0 %    Platelet Count 185 150 - 450 10e3/uL   Blood Culture Arm, Left     Status: Normal (Preliminary result)    Specimen: Arm, Left; Blood   Result Value Ref Range    Culture No growth after 3 days    Blood Culture Peripheral Blood     Status: Normal (Preliminary result)     Specimen: Peripheral Blood   Result Value Ref Range    Culture No growth after 3 days

## 2022-07-26 NOTE — PLAN OF CARE
Pt Alert but nonverbal. Pain managed adequately with rectal tylenol. Incontinent of bowel and bladder. Still NPO. Tolerating pills crushed in pudding. Tolerated 3-4 spoons of vanilla pudding with medication and after meds. Had a dry heaving episode about 2130 but no emesis this shift. Scopolamine patch in place. IVF running at 75 ml/hr. Sitter at the bedside. Will continue to monitor.

## 2022-07-26 NOTE — PLAN OF CARE
"SLP: see \"IP Rehab Daily Documentation\" flowsheet for full details re: dysphagia therapy session.     No recent vomitting occurances per staff/chart. Agree with prior SLP \"vomiting not appearing related to oropharyngeal swallow dysfunction but with concern about reflux-related etiology and then posing aspiration and aspiration pneumonia risks with vomiting.\" Consider GI consult.     Pt did clinically tolerate PO trials with me this AM from an oropharyngeal standpoint.     Noted palliative care/goals of care discussion. Text paged hospitalist with update, defer to them re: maintain NPO status vs PO re-initiation.    If deciding PO resumption, recommend: puree solids with extremely thick liquids, liquids via 1/2 tsp bolus sizes, slow pacing. Pt must be fully upright, encourage into chair for PO if able, remain upright a minimum of 30 minutes after PO to reduced reflux/vomitting.    "

## 2022-07-26 NOTE — PROGRESS NOTES
[facility]  Palliative Care Progress Note  Text Page     Recommendations:  1. Goals of Care- No CPR - Do NOT Intubate - Comfort focused care with plan for hospice on dismissal  Hospitalization goals discussed with the patient's guardian Suzanne Wells, who discussed with family about goals of care.  Decisional Capacity- Unreliable. Patient has a Guardian, Suzanne Wells from [employer] [place].  See paperwork scanned under ACP tab.  All medical decisions and consents should be addressed to Guardian.  POLST - Complete indicating the request for COMFORT-FOCUSED TREATMENT - with comfort focused eating plan NO TUBE FEEDING.     2. Dysphagia - Recurrent admission for aspiration pneumonia. Appreciate recommendations for comfort eating per Speech Therapist Brielle Joe, SLP. Guardian aware of risk of aspiration and request a comfort eating plan using recommendations from Speech Therapy to provide a safe eating plan.      3. Dyspnea - Respirations currently unlabored.  Guardian request NO INTUBATION and comfort-focused treatment plan.       4. Generalized weakness - Cerebral palsy with partial quadriplegia, wheelchair bound and resides at group home. Comfort-focused treatment plan.     5. Spiritual Care  Oriented to Spiritual Health as part of Palliative Care team. Spiritual Health Services declined at this time.  Spiritual Background: No [facility]     6. Care Planning  Appreciate input of  Ann Davis as patient resides at group home and will dismiss with support of hospice.   Medications for discharge - Hospice prescriptions have been ordered.       Medical Decision Making and Goals of Care:  Discussed on July 26, 2022 with BERYL Acosta CNS:      I phoned the patient's guardian Suzanne Wells at [phone]. She returned my phone call and acknowledged having a discussion with the family. She request a comfort-focused plan of care, No CPR- Do NOT INTUBATE. The  family is aware of risk of aspiration and request a comfort-focused eating plan NO TUBE FEEDINGS.      Appreciate the notification from  NILAM Lovett that the group home care providers asked to meet with me. Suzanne Wells had notified them regarding the plan for hospice support on dismissal. We discussed the benefit of hospice in caring for Blaze at the group home. They called Suzanne regarding which hospice provider and Suzanne had spoken with NILAM Lovett to have [facility] on dismissal. The group home acknowledged they are able to give liquid medications. I have given them a copy of Blaze's new POLST.          Thank you for involving us in the patient's care.      Dena Bain MS, RN, CNS, APRN, ACHPN, FAACVPR  Pain and Palliative Care  Pager [phone]  Office [phone]        Time Spent on this Encounter   Total unit/floor time 2:15 PM until 3:25 PM, time consisted of the following, examination of the patient, reviewing the record and completing documentation. >50% of time spent in counseling and coordination of care.  Time spend counseling with legal guardian consisted of the following topics, goals of care.  Time spent in coordination of care with Bedside Nurse Paz Wise RN, Hospitalist Christen Sanhcez DO and  NILAM Lovett.     Interval History   Chart reviewed - no vomiting events an no new issues.       Review of Systems     Unable as patient is non-verbal     Physical Exam   Temp:  [97.3  F (36.3  C)-98.7  F (37.1  C)] 97.3  F (36.3  C)  Pulse:  [53-87] 53  Resp:  [18] 18  BP: ()/(47-60) 93/47  SpO2:  [94 %-96 %] 96 %  0 lbs 0 oz  GEN:  Cachetic and frail  male. Opens eyes, non-verbal and peacefully resting in bed..  HEENT:  Normocephalic/atraumatic, no scleral icterus, no nasal discharge, mouth moist.  RESP:  Symmetric chest rise on inhalation noted.  Normal respiratory effort.      Medications     lactated ringers 75  mL/hr at 07/26/22 0505       albuterol  2.5 mg Nebulization BID     amoxicillin-clavulanate  1 tablet Oral BID     cetirizine  10 mg Oral Daily     citalopram  40 mg Oral Daily     fluticasone  2 spray Both Nostrils Daily     montelukast  10 mg Oral At Bedtime     OLANZapine  2.5 mg Oral At Bedtime     olopatadine  1 drop Left Eye BID     pantoprazole (PROTONIX) IV  40 mg Intravenous BID     scopolamine  1 patch Transdermal Q72H    And     scopolamine   Transdermal Q8H     sucralfate  1 g Oral 4x Daily AC & HS     tiZANidine  4 mg Oral TID       Data   No results found for this or any previous visit (from the past 24 hour(s)).

## 2022-07-26 NOTE — PLAN OF CARE
[id number]    Inpatient Progress Note:    BP 93/47 (BP Location: Right arm)   Pulse 87   Temp 97.3  F (36.3  C) (Axillary)   Resp 18   SpO2 95%      Pt alert when awake. Non verbal at baseline. Assist of 2 with turning and repositioning. Scop patch behind right ear. Denies nausea. Dry heaving and vomiting not observed. NPO. On RA satting > 90%. Repositioned throughout the night, but pt prefers to lay on left side. Sitter at bedside for safety. LR infusing at 75ml/hr. Will continue to monitor and provide supportive cares.

## 2022-07-27 NOTE — PLAN OF CARE
PRIMARY DIAGNOSIS: ASPIRATION PNEUMONITIS  OUTPATIENT/OBSERVATION GOALS TO BE MET BEFORE DISCHARGE:  1. ADLs back to baseline: No    2. Activity and level of assistance: Bedrest    3. Pain status: Unable to assess, pt is non-verbal    4. Return to near baseline physical activity: No     Discharge Planner Nurse   Safe discharge environment identified: Yes  Barriers to discharge: Yes       Entered by: Kirstie Benedict RN 07/27/2022 5:49 AM     Patient is non-verbal at baseline, unable to assess orientation, alert, VSS, bedrest here, assist x2 with lift assist, regular diet extremely thick level 4, no episodes of emesis this shift, IV in the Lt arm is saline locked, incontinent of bowel and bladder, comfort cares was initiated yesterday, vital signs are as needed, repositioning during the shift,  sleeping well, will continue to monitor and provide cares.      Please review provider order for any additional goals.   Nurse to notify provider when observation goals have been met and patient is ready for discharge.Goal Outcome Evaluation:

## 2022-07-27 NOTE — PLAN OF CARE
Speech Language Therapy Discharge Summary    Reason for therapy discharge:    Change in goals of care, comfort cares initiated.     Progress towards therapy goal(s). See goals on Care Plan in [facility] electronic health record for goal details.  Goals not met.  Barriers to achieving goals:   change in goals of care.    Therapy recommendation(s):    No further therapy is recommended. SLP recommendations as follows, changes can be made by provider as warranted - Likely largest risk for aspiration of gastric contents if recurrent vomitting occurs, though from an oropharyngeal standpoint appears safe for PO reinitiation. Safest possible PO: puree solids with extremely thick liquids, liquids via 1/2 tsp bolus sizes, slow pacing. Pt must be fully upright, encourage into chair for PO if able, remain upright a minimum of 30 minutes after PO to reduced reflux/vomitting.

## 2022-07-27 NOTE — PLAN OF CARE
Pt alert when awake. Non verbal at baseline. Assist of 2 with turning and repositioning. Scop patch behind right ear. Denies nausea. Dry heaving and vomiting not observed. Diet changed to extremely thick regular diet as family elected for pt to have acomfort based eating approach and enrollment in hospice upon discharge back to Group home. Tolerating diet and medicationsadequately, ate 50%of dinner, some chocolate ice cream and pudding. On RA satting > 90%. Repositioned throughout the evening. Sitter at bedside for safety. IV saline locked. Will continue to provide supportive cares.

## 2022-07-27 NOTE — PLAN OF CARE
Patient's After Visit Summary sent with group home.   Patient verbalized understanding of After Visit Summary, recommended follow up and was given an opportunity to ask questions.   Discharge medications sent home with patient/family: All orders sent faxed to facility and hard packet sent with group home.    Discharged with group home. Patient medically cleared to discharge. All belongings sent with patient.   BP 93/47 (BP Location: Right arm)   Pulse (!) 49   Temp 97.3  F (36.3  C) (Axillary)   Resp 18   SpO2 98%

## 2022-07-27 NOTE — DISCHARGE SUMMARY
[facility]  Hospitalist Discharge Summary      Date of Admission:  7/22/2022  Date of Discharge:  7/27/2022  Discharging Provider: Cisco Scott DO  Discharge Service: Hospitalist Service    Discharge Diagnoses   1.  Aspiration pneumonitis.  2.  Acute hypoxic respiratory failure.  3.  Dysphagia.  4.  Cerebral palsy.  5.  Partial quadriplegia.    Follow-ups Needed After Discharge   Follow-up Appointments     Follow-up and recommended labs and tests       Follow-up with hospice care within 3 days.             Discharge Disposition   Discharged to home  Condition at discharge: Guarded.  Discharging with plan for comfort care and hospice.    Hospital Course   Mr. Blaze Marx is a 57 year old male with a history of cerebral palsy, partial quadriplegia, nonverbal state, dysphagia with recurrent aspiration episodes, previous recurrent nausea/vomiting, severe esophagitis on PPI who was admitted on 7/22/2022 with an aspiration event leading to an isolated fever and brief hypoxia.  After goals of care discussion with legal guardian, patient was transitioned to hospice cares with plans to discharge back to facility on 7/27 with hospice.  We will adjust medications on discharge for hospice cares.    Consultations This Hospital Stay   SPEECH LANGUAGE PATH ADULT IP CONSULT  PALLIATIVE CARE ADULT IP CONSULT  SOCIAL WORK IP CONSULT  CARE MANAGEMENT / SOCIAL WORK IP CONSULT    Code Status   No CPR- Do NOT Intubate    Time Spent on this Encounter   I spent 20 minutes with Mr. Marx and working on discharge on 7/27/2022.       Cisco Scott DO  [facility] OBSERVATION DEPT  [address] [place] [address]  Phone: [phone]  ______________________________________________________________________    Physical Exam   Vital Signs:       Pulse: (!) 49   Resp: 18 SpO2: 98 % O2 Device: None (Room air)    Weight: 0 lbs 0 oz  Gen:  NAD, awake, nonverbal.  Eyes:  Sclera  anicteric.  OP:  MMM, no lesions.  CV:  Regular, no murmurs.  Lung:  CTA b/l, normal effort.  Ab:  +BS, soft.  Skin:  Warm, dry to touch.  No rash.  Ext:  No pitting edema LE b/l.         Primary Care Physician   Pily Blackmon    Discharge Orders      Reason for your hospital stay    Aspiration pneumonitis.     Follow-up and recommended labs and tests     Follow-up with hospice care within 3 days.     Activity    Your activity upon discharge: activity as tolerated     Diet    Follow this diet upon discharge:       Regular Diet Adult Extremely Thick (level 4)       Discharge Medications   Current Discharge Medication List      START taking these medications    Details   acetaminophen (TYLENOL) 650 MG suppository Place 1 suppository (650 mg) rectally every 4 hours as needed for fever  Qty: 4 suppository, Refills: 0    Associated Diagnoses: Aspiration pneumonitis (H)      atropine 1 % ophthalmic solution Take 1-2 drops by mouth, place under tongue or place inside cheek every 4 hours as needed for secretions  Qty: 5 mL, Refills: 0    Associated Diagnoses: Aspiration pneumonitis (H)      haloperidol (HALDOL) 2 MG/ML (HIGH CONC) solution Take 0.25-0.5 mLs (0.5-1 mg) by mouth or place under tongue every 6 hours as needed for agitation or other (nausea)  Qty: 10 mL, Refills: 0    Associated Diagnoses: Aspiration pneumonitis (H)      LORazepam (ATIVAN) 2 MG/ML (HIGH CONC) oral solution Take 0.25 mLs (0.5 mg) by mouth or place under tongue every 4 hours as needed for anxiety (restlessness)  Qty: 30 mL, Refills: 0    Associated Diagnoses: Aspiration pneumonitis (H)      morphine sulfate, high concentrate, (ROXANOL-CONCENTRATED) 20 MG/ML concentrated solution Take 0.125-0.25 mLs (2.5-5 mg) by mouth or place under tongue every 2 hours as needed for shortness of breath / dyspnea or pain  Qty: 30 mL, Refills: 0    Comments: Hospice patient. Dispense in quantities of 30 mL.  Associated Diagnoses: Aspiration pneumonitis (H)          CONTINUE these medications which have CHANGED    Details   bisacodyl (DULCOLAX) 10 MG suppository Place 1 suppository (10 mg) rectally daily as needed for constipation  Qty: 2 suppository, Refills: 0    Associated Diagnoses: Aspiration pneumonitis (H)      senna (SENNA LAXATIVE) 8.6 MG tablet Take 1-2 tablets by mouth 2 times daily as needed for constipation  Qty: 100 tablet, Refills: 0    Associated Diagnoses: Aspiration pneumonitis (H)         CONTINUE these medications which have NOT CHANGED    Details   albuterol (PROVENTIL) (2.5 MG/3ML) 0.083% neb solution Take 2.5 mg by nebulization 2 times daily       carbamide peroxide (DEBROX) 6.5 % otic solution Place 5-10 drops into both ears every 30 days      cetirizine (ZYRTEC) 10 MG tablet Take 10 mg by mouth daily      citalopram (CELEXA) 40 MG tablet Take 40 mg by mouth daily      diclofenac (VOLTAREN) 1 % topical gel Apply 1 g topically 2 times daily . To each knee      fluticasone (FLONASE) 50 MCG/ACT nasal spray Spray 2 sprays into both nostrils daily      magnesium hydroxide (MILK OF MAGNESIA) 400 MG/5ML suspension Take 30 mLs by mouth daily      montelukast (SINGULAIR) 10 MG tablet Take 10 mg by mouth At Bedtime      OLANZapine (ZYPREXA) 2.5 MG tablet Take 2.5 mg by mouth At Bedtime      olopatadine (PATANOL) 0.1 % ophthalmic solution Place 1 drop Into the left eye 2 times daily      ondansetron (ZOFRAN ODT) 4 MG ODT tab Take 1 tablet (4 mg) by mouth every 4 hours as needed for nausea or vomiting  Qty: 20 tablet, Refills: 0    Associated Diagnoses: Intractable vomiting, presence of nausea not specified, unspecified vomiting type; Fecal impaction (H)      pantoprazole (PROTONIX) 20 MG EC tablet Take 20 mg by mouth 2 times daily      sodium fluoride dental gel (PREVIDENT) 1.1 % GEL topical gel Apply to affected area At Bedtime      tiZANidine (ZANAFLEX) 4 MG tablet Take 4 mg by mouth 3 times daily         STOP taking these medications        amoxicillin-clavulanate (AUGMENTIN) 875-125 MG tablet Comments:   Reason for Stopping:         cholecalciferol 25 MCG (1000 UT) TABS Comments:   Reason for Stopping:         LORazepam (ATIVAN) 0.5 MG tablet Comments:   Reason for Stopping:         polyethylene glycol (MIRALAX) 17 GM/Dose powder Comments:   Reason for Stopping:             Allergies   Allergies   Allergen Reactions     Nutritional Supplements Nausea and Vomiting     increased vomiting when taking this.     Seasonal Allergies

## 2022-07-27 NOTE — PLAN OF CARE
Patient is non verbal at baseline. On comfort care, scheduled to discharge today. Incontinent of bladder and bowel. Regular diet/ puree with extremely thick liquid. Took his medications crushed with pudding without  coughing or emesis. Group home staff will transport at 1130.

## (undated) DEVICE — BAG RED BIOHAZARD 37X50" 40GAL A7450PR

## (undated) DEVICE — GOWN XLG DISP 9545

## (undated) DEVICE — SOL WATER IRRIG 1000ML BOTTLE 2F7114

## (undated) DEVICE — SUCTION CANISTER MEDIVAC LINER 3000ML W/LID 65651-530

## (undated) DEVICE — KIT PROCEDURE W/CLEAN-A-SCOPE LINERS V2 200800

## (undated) DEVICE — TUBING SUCTION MEDI-VAC SOFT 3/16"X20' N520A

## (undated) DEVICE — BAG CLEAR TRASH 1.3M 39X33" P4040C

## (undated) DEVICE — PAD CHUX UNDERPAD 23X24" 7136

## (undated) RX ORDER — ONDANSETRON 2 MG/ML
INJECTION INTRAMUSCULAR; INTRAVENOUS
Status: DISPENSED
Start: 2022-01-01

## (undated) RX ORDER — PROPOFOL 10 MG/ML
INJECTION, EMULSION INTRAVENOUS
Status: DISPENSED
Start: 2022-01-01

## (undated) RX ORDER — DEXAMETHASONE SODIUM PHOSPHATE 4 MG/ML
INJECTION, SOLUTION INTRA-ARTICULAR; INTRALESIONAL; INTRAMUSCULAR; INTRAVENOUS; SOFT TISSUE
Status: DISPENSED
Start: 2022-01-01

## (undated) RX ORDER — LIDOCAINE HYDROCHLORIDE 10 MG/ML
INJECTION, SOLUTION EPIDURAL; INFILTRATION; INTRACAUDAL; PERINEURAL
Status: DISPENSED
Start: 2022-01-01